# Patient Record
Sex: FEMALE | Race: BLACK OR AFRICAN AMERICAN | Employment: OTHER | URBAN - METROPOLITAN AREA
[De-identification: names, ages, dates, MRNs, and addresses within clinical notes are randomized per-mention and may not be internally consistent; named-entity substitution may affect disease eponyms.]

---

## 2017-02-28 ENCOUNTER — TELEPHONE (OUTPATIENT)
Dept: INTERNAL MEDICINE CLINIC | Age: 67
End: 2017-02-28

## 2017-05-12 RX ORDER — ZOLPIDEM TARTRATE 10 MG/1
TABLET ORAL
Qty: 30 TABLET | Refills: 0 | Status: SHIPPED | OUTPATIENT
Start: 2017-05-12 | End: 2017-07-28 | Stop reason: SDUPTHER

## 2017-05-22 ENCOUNTER — CLINICAL DOCUMENTATION (OUTPATIENT)
Dept: INTERNAL MEDICINE CLINIC | Age: 67
End: 2017-05-22

## 2017-07-28 ENCOUNTER — OFFICE VISIT (OUTPATIENT)
Dept: INTERNAL MEDICINE CLINIC | Age: 67
End: 2017-07-28

## 2017-07-28 DIAGNOSIS — E78.00 PURE HYPERCHOLESTEROLEMIA: ICD-10-CM

## 2017-07-28 DIAGNOSIS — R73.9 HYPERGLYCEMIA: ICD-10-CM

## 2017-07-28 DIAGNOSIS — I10 ESSENTIAL HYPERTENSION: ICD-10-CM

## 2017-07-28 DIAGNOSIS — Z00.00 ROUTINE GENERAL MEDICAL EXAMINATION AT A HEALTH CARE FACILITY: Primary | ICD-10-CM

## 2017-07-28 DIAGNOSIS — Z78.0 ASYMPTOMATIC POSTMENOPAUSAL STATUS (AGE-RELATED) (NATURAL): ICD-10-CM

## 2017-07-28 LAB
A/G RATIO: 1.8 (ref 1.1–2.2)
ALBUMIN SERPL-MCNC: 4.6 G/DL (ref 3.4–5)
ALP BLD-CCNC: 77 U/L (ref 40–129)
ALT SERPL-CCNC: 16 U/L (ref 10–40)
ANION GAP SERPL CALCULATED.3IONS-SCNC: 13 MMOL/L (ref 3–16)
AST SERPL-CCNC: 21 U/L (ref 15–37)
BILIRUB SERPL-MCNC: 1 MG/DL (ref 0–1)
BUN BLDV-MCNC: 16 MG/DL (ref 7–20)
CALCIUM SERPL-MCNC: 9.7 MG/DL (ref 8.3–10.6)
CHLORIDE BLD-SCNC: 101 MMOL/L (ref 99–110)
CHOLESTEROL, FASTING: 159 MG/DL (ref 0–199)
CO2: 29 MMOL/L (ref 21–32)
CREAT SERPL-MCNC: 0.8 MG/DL (ref 0.6–1.2)
GFR AFRICAN AMERICAN: >60
GFR NON-AFRICAN AMERICAN: >60
GLOBULIN: 2.5 G/DL
GLUCOSE FASTING: 102 MG/DL (ref 70–99)
HDLC SERPL-MCNC: 49 MG/DL (ref 40–60)
LDL CHOLESTEROL CALCULATED: 93 MG/DL
POTASSIUM SERPL-SCNC: 3.9 MMOL/L (ref 3.5–5.1)
SODIUM BLD-SCNC: 143 MMOL/L (ref 136–145)
TOTAL PROTEIN: 7.1 G/DL (ref 6.4–8.2)
TRIGLYCERIDE, FASTING: 85 MG/DL (ref 0–150)
VLDLC SERPL CALC-MCNC: 17 MG/DL

## 2017-07-28 PROCEDURE — 99397 PER PM REEVAL EST PAT 65+ YR: CPT | Performed by: INTERNAL MEDICINE

## 2017-07-28 RX ORDER — ZOLPIDEM TARTRATE 10 MG/1
TABLET ORAL
Qty: 30 TABLET | Refills: 2 | Status: SHIPPED | OUTPATIENT
Start: 2017-07-28 | End: 2018-02-08 | Stop reason: SDUPTHER

## 2017-07-28 ASSESSMENT — PATIENT HEALTH QUESTIONNAIRE - PHQ9
1. LITTLE INTEREST OR PLEASURE IN DOING THINGS: 0
SUM OF ALL RESPONSES TO PHQ QUESTIONS 1-9: 0
2. FEELING DOWN, DEPRESSED OR HOPELESS: 0
SUM OF ALL RESPONSES TO PHQ9 QUESTIONS 1 & 2: 0

## 2017-07-29 VITALS
DIASTOLIC BLOOD PRESSURE: 85 MMHG | BODY MASS INDEX: 29.35 KG/M2 | HEART RATE: 79 BPM | SYSTOLIC BLOOD PRESSURE: 132 MMHG | RESPIRATION RATE: 14 BRPM | HEIGHT: 67 IN | WEIGHT: 187 LBS

## 2017-07-29 LAB
ESTIMATED AVERAGE GLUCOSE: 125.5 MG/DL
HBA1C MFR BLD: 6 %

## 2017-08-08 RX ORDER — HYDROCHLOROTHIAZIDE 25 MG/1
25 TABLET ORAL DAILY
Qty: 90 TABLET | Refills: 0 | Status: SHIPPED | OUTPATIENT
Start: 2017-08-08 | End: 2017-10-01 | Stop reason: SDUPTHER

## 2017-08-08 RX ORDER — LEVOTHYROXINE SODIUM 0.15 MG/1
150 TABLET ORAL DAILY
Qty: 90 TABLET | Refills: 0 | Status: SHIPPED | OUTPATIENT
Start: 2017-08-08 | End: 2017-10-01 | Stop reason: SDUPTHER

## 2017-08-08 RX ORDER — SIMVASTATIN 40 MG
40 TABLET ORAL NIGHTLY
Qty: 90 TABLET | Refills: 0 | Status: SHIPPED | OUTPATIENT
Start: 2017-08-08 | End: 2017-10-01 | Stop reason: SDUPTHER

## 2017-08-31 ENCOUNTER — TELEPHONE (OUTPATIENT)
Dept: INTERNAL MEDICINE CLINIC | Age: 67
End: 2017-08-31

## 2017-08-31 NOTE — TELEPHONE ENCOUNTER
Pt called back to let Christine Santoyo know that she is on her way now to [facility]

## 2017-10-02 RX ORDER — HYDROCHLOROTHIAZIDE 25 MG/1
TABLET ORAL
Qty: 90 TABLET | Refills: 0 | Status: SHIPPED | OUTPATIENT
Start: 2017-10-02 | End: 2018-02-08 | Stop reason: SDUPTHER

## 2017-10-02 RX ORDER — SIMVASTATIN 40 MG
TABLET ORAL
Qty: 90 TABLET | Refills: 0 | Status: SHIPPED | OUTPATIENT
Start: 2017-10-02 | End: 2018-02-08 | Stop reason: SDUPTHER

## 2017-10-02 RX ORDER — LEVOTHYROXINE SODIUM 0.15 MG/1
TABLET ORAL
Qty: 90 TABLET | Refills: 0 | Status: SHIPPED | OUTPATIENT
Start: 2017-10-02 | End: 2018-02-08 | Stop reason: SDUPTHER

## 2018-02-08 ENCOUNTER — OFFICE VISIT (OUTPATIENT)
Dept: INTERNAL MEDICINE CLINIC | Age: 68
End: 2018-02-08

## 2018-02-08 VITALS
BODY MASS INDEX: 29.03 KG/M2 | WEIGHT: 184 LBS | HEART RATE: 80 BPM | DIASTOLIC BLOOD PRESSURE: 68 MMHG | SYSTOLIC BLOOD PRESSURE: 122 MMHG

## 2018-02-08 DIAGNOSIS — R73.9 HYPERGLYCEMIA: ICD-10-CM

## 2018-02-08 DIAGNOSIS — F51.04 PSYCHOPHYSIOLOGICAL INSOMNIA: ICD-10-CM

## 2018-02-08 DIAGNOSIS — E78.00 PURE HYPERCHOLESTEROLEMIA: ICD-10-CM

## 2018-02-08 DIAGNOSIS — E03.4 HYPOTHYROIDISM DUE TO ACQUIRED ATROPHY OF THYROID: ICD-10-CM

## 2018-02-08 DIAGNOSIS — Z78.0 POST-MENOPAUSAL: ICD-10-CM

## 2018-02-08 DIAGNOSIS — I10 ESSENTIAL HYPERTENSION: ICD-10-CM

## 2018-02-08 DIAGNOSIS — J06.9 URI, ACUTE: Primary | ICD-10-CM

## 2018-02-08 PROCEDURE — 1123F ACP DISCUSS/DSCN MKR DOCD: CPT | Performed by: INTERNAL MEDICINE

## 2018-02-08 PROCEDURE — 3014F SCREEN MAMMO DOC REV: CPT | Performed by: INTERNAL MEDICINE

## 2018-02-08 PROCEDURE — 99214 OFFICE O/P EST MOD 30 MIN: CPT | Performed by: INTERNAL MEDICINE

## 2018-02-08 PROCEDURE — 3017F COLORECTAL CA SCREEN DOC REV: CPT | Performed by: INTERNAL MEDICINE

## 2018-02-08 PROCEDURE — 4040F PNEUMOC VAC/ADMIN/RCVD: CPT | Performed by: INTERNAL MEDICINE

## 2018-02-08 PROCEDURE — G8427 DOCREV CUR MEDS BY ELIG CLIN: HCPCS | Performed by: INTERNAL MEDICINE

## 2018-02-08 PROCEDURE — G8484 FLU IMMUNIZE NO ADMIN: HCPCS | Performed by: INTERNAL MEDICINE

## 2018-02-08 PROCEDURE — G8417 CALC BMI ABV UP PARAM F/U: HCPCS | Performed by: INTERNAL MEDICINE

## 2018-02-08 PROCEDURE — 1036F TOBACCO NON-USER: CPT | Performed by: INTERNAL MEDICINE

## 2018-02-08 PROCEDURE — 1090F PRES/ABSN URINE INCON ASSESS: CPT | Performed by: INTERNAL MEDICINE

## 2018-02-08 PROCEDURE — G8400 PT W/DXA NO RESULTS DOC: HCPCS | Performed by: INTERNAL MEDICINE

## 2018-02-08 RX ORDER — AZITHROMYCIN 250 MG/1
TABLET, FILM COATED ORAL
Qty: 1 PACKET | Refills: 0 | Status: SHIPPED | OUTPATIENT
Start: 2018-02-08 | End: 2018-02-18

## 2018-02-08 RX ORDER — HYDROCHLOROTHIAZIDE 25 MG/1
TABLET ORAL
Qty: 90 TABLET | Refills: 1 | Status: SHIPPED | OUTPATIENT
Start: 2018-02-08 | End: 2018-08-03 | Stop reason: SDUPTHER

## 2018-02-08 RX ORDER — ZOLPIDEM TARTRATE 5 MG/1
5 TABLET ORAL NIGHTLY PRN
Qty: 30 TABLET | Refills: 2 | Status: SHIPPED | OUTPATIENT
Start: 2018-02-08 | End: 2018-04-26 | Stop reason: SDUPTHER

## 2018-02-08 RX ORDER — SIMVASTATIN 40 MG
TABLET ORAL
Qty: 90 TABLET | Refills: 1 | Status: SHIPPED | OUTPATIENT
Start: 2018-02-08 | End: 2018-08-03 | Stop reason: SDUPTHER

## 2018-02-08 RX ORDER — LEVOTHYROXINE SODIUM 0.15 MG/1
TABLET ORAL
Qty: 90 TABLET | Refills: 3 | Status: SHIPPED | OUTPATIENT
Start: 2018-02-08 | End: 2019-04-02 | Stop reason: SDUPTHER

## 2018-02-08 NOTE — PROGRESS NOTES
supple. No thyroid mass and no thyromegaly present. Cardiovascular: Normal rate, regular rhythm and normal heart sounds. Exam reveals no gallop and no friction rub. No murmur heard. Pulmonary/Chest: Effort normal and breath sounds normal. No respiratory distress. She has no wheezes. She has no rhonchi. She has no rales. Musculoskeletal: She exhibits edema (+1 bilateral ankles). Lymphadenopathy:     She has cervical adenopathy. Neurological: She is alert and oriented to person, place, and time. Skin: Skin is warm, dry and intact. No rash noted. No erythema. Psychiatric: She has a normal mood and affect.  Her speech is normal and behavior is normal. Judgment and thought content normal. Cognition and memory are normal.

## 2018-02-11 ENCOUNTER — TELEPHONE (OUTPATIENT)
Dept: INTERNAL MEDICINE CLINIC | Age: 68
End: 2018-02-11

## 2018-02-11 RX ORDER — BENZONATATE 100 MG/1
100 CAPSULE ORAL 3 TIMES DAILY PRN
Qty: 30 CAPSULE | Refills: 1 | Status: SHIPPED | OUTPATIENT
Start: 2018-02-11 | End: 2018-02-18

## 2018-02-11 NOTE — TELEPHONE ENCOUNTER
Follow-up from office visit 2 days ago: cough is worse, now productive of yellow sputum. Started Sarahi Parsley yesterday. Has tried Robitussin DM, but cough still kept her awake. No SOB, fevers or other new symptoms. Supportive care guidelines discussed. Script for [employer] sent to pharmacy. Continue Zithromax. Patient will call if symptoms change or worsen or if there is no significant improvement in 2-3 days.

## 2018-02-15 DIAGNOSIS — J06.9 URI, ACUTE: ICD-10-CM

## 2018-02-15 RX ORDER — AZITHROMYCIN 250 MG/1
TABLET, FILM COATED ORAL
Qty: 1 PACKET | Refills: 0 | Status: CANCELLED | OUTPATIENT
Start: 2018-02-15 | End: 2018-02-25

## 2018-02-15 RX ORDER — AMOXICILLIN AND CLAVULANATE POTASSIUM 875; 125 MG/1; MG/1
1 TABLET, FILM COATED ORAL 2 TIMES DAILY
Qty: 20 TABLET | Refills: 0 | Status: SHIPPED | OUTPATIENT
Start: 2018-02-15 | End: 2020-12-02 | Stop reason: SDUPTHER

## 2018-02-15 NOTE — TELEPHONE ENCOUNTER
From: Jessica Pascal  Sent: 2/15/2018 12:30 PM EST  Subject: Medication Renewal Request    Jessica Pascal would like a refill of the following medications:  azithromycin (ZITHROMAX Z-GAVIN) 250 MG tablet Sandor Foster MD]    Preferred pharmacy: [address] [place] [address] [phone] - F [phone]    Comment:

## 2018-02-27 ENCOUNTER — PATIENT MESSAGE (OUTPATIENT)
Dept: INTERNAL MEDICINE CLINIC | Age: 68
End: 2018-02-27

## 2018-02-27 RX ORDER — FLUCONAZOLE 200 MG/1
TABLET ORAL
Qty: 2 TABLET | Refills: 0 | Status: SHIPPED | OUTPATIENT
Start: 2018-02-27 | End: 2018-11-08

## 2018-04-27 RX ORDER — ZOLPIDEM TARTRATE 5 MG/1
TABLET ORAL
Qty: 30 TABLET | Refills: 0 | Status: SHIPPED | OUTPATIENT
Start: 2018-04-27 | End: 2018-05-27

## 2018-08-03 ENCOUNTER — OFFICE VISIT (OUTPATIENT)
Dept: INTERNAL MEDICINE CLINIC | Age: 68
End: 2018-08-03

## 2018-08-03 VITALS
BODY MASS INDEX: 29.98 KG/M2 | SYSTOLIC BLOOD PRESSURE: 116 MMHG | DIASTOLIC BLOOD PRESSURE: 78 MMHG | HEIGHT: 67 IN | WEIGHT: 191 LBS | HEART RATE: 68 BPM

## 2018-08-03 DIAGNOSIS — Z00.00 ROUTINE GENERAL MEDICAL EXAMINATION AT A HEALTH CARE FACILITY: Primary | ICD-10-CM

## 2018-08-03 DIAGNOSIS — I10 ESSENTIAL HYPERTENSION: ICD-10-CM

## 2018-08-03 DIAGNOSIS — F51.04 PSYCHOPHYSIOLOGICAL INSOMNIA: ICD-10-CM

## 2018-08-03 DIAGNOSIS — E78.00 PURE HYPERCHOLESTEROLEMIA: ICD-10-CM

## 2018-08-03 DIAGNOSIS — E03.4 HYPOTHYROIDISM DUE TO ACQUIRED ATROPHY OF THYROID: ICD-10-CM

## 2018-08-03 DIAGNOSIS — R73.9 HYPERGLYCEMIA: ICD-10-CM

## 2018-08-03 PROCEDURE — 90471 IMMUNIZATION ADMIN: CPT | Performed by: INTERNAL MEDICINE

## 2018-08-03 PROCEDURE — 90732 PPSV23 VACC 2 YRS+ SUBQ/IM: CPT | Performed by: INTERNAL MEDICINE

## 2018-08-03 PROCEDURE — 99397 PER PM REEVAL EST PAT 65+ YR: CPT | Performed by: INTERNAL MEDICINE

## 2018-08-03 RX ORDER — ZOLPIDEM TARTRATE 5 MG/1
5 TABLET ORAL NIGHTLY PRN
COMMUNITY
End: 2018-08-03 | Stop reason: SDUPTHER

## 2018-08-03 RX ORDER — HYDROCHLOROTHIAZIDE 25 MG/1
TABLET ORAL
Qty: 90 TABLET | Refills: 1 | Status: SHIPPED | OUTPATIENT
Start: 2018-08-03 | End: 2019-04-02 | Stop reason: SDUPTHER

## 2018-08-03 RX ORDER — SIMVASTATIN 40 MG
TABLET ORAL
Qty: 90 TABLET | Refills: 1 | Status: SHIPPED | OUTPATIENT
Start: 2018-08-03 | End: 2019-04-02 | Stop reason: SDUPTHER

## 2018-08-03 RX ORDER — ZOLPIDEM TARTRATE 5 MG/1
5 TABLET ORAL NIGHTLY PRN
Qty: 30 TABLET | Refills: 2 | Status: SHIPPED | OUTPATIENT
Start: 2018-08-03 | End: 2019-04-09 | Stop reason: SDUPTHER

## 2018-08-03 ASSESSMENT — PATIENT HEALTH QUESTIONNAIRE - PHQ9
SUM OF ALL RESPONSES TO PHQ9 QUESTIONS 1 & 2: 0
1. LITTLE INTEREST OR PLEASURE IN DOING THINGS: 0
2. FEELING DOWN, DEPRESSED OR HOPELESS: 0
SUM OF ALL RESPONSES TO PHQ QUESTIONS 1-9: 0

## 2018-08-03 NOTE — PROGRESS NOTES
Hypertension     Hypothyroidism     Insomnia     Kidney cysts     Liver cyst      Past Surgical History:   Procedure Laterality Date    HYSTERECTOMY  1996    THYROIDECTOMY, PARTIAL Right 1998    Thyroid nodule     Family History   Problem Relation Age of Onset    Hypertension Mother        Review of Systems:  A comprehensive review of systems was negative except for: hot flashes/night sweats     Physical Exam   Constitutional: She is oriented to person, place, and time. She appears well-developed and well-nourished. No distress. HENT:   Head: Normocephalic and atraumatic. Right Ear: Tympanic membrane, external ear and ear canal normal.   Left Ear: Tympanic membrane, external ear and ear canal normal.   Mouth/Throat: Oropharynx is clear and moist and mucous membranes are normal.   Eyes: Conjunctivae and lids are normal. Pupils are equal, round, and reactive to light. Neck: Neck supple. Carotid bruit is not present. No thyroid mass and no thyromegaly present. Cardiovascular: Normal rate, regular rhythm, normal heart sounds and intact distal pulses. Exam reveals no gallop and no friction rub. No murmur heard. Pulmonary/Chest: Effort normal and breath sounds normal. No respiratory distress. She has no wheezes. She has no rhonchi. She has no rales. Abdominal: Soft. Normal aorta and bowel sounds are normal. She exhibits no distension and no mass. There is no hepatosplenomegaly. There is no tenderness. Musculoskeletal: Normal range of motion. She exhibits no edema or tenderness. Lymphadenopathy:     She has no cervical adenopathy. Neurological: She is alert and oriented to person, place, and time. She has normal reflexes. Coordination normal.   Skin: Skin is warm and dry. No rash noted. No erythema. No suspicious lesions. Psychiatric: She has a normal mood and affect.  Her speech is normal and behavior is normal. Judgment and thought content normal. Cognition and memory are normal. Lipid panel:   Lab Results   Component Value Date    CHOL 238 (H) 11/11/2016    TRIG 106 11/11/2016    HDL 51 02/08/2018    LDLCALC 111 (H) 02/08/2018     Glucose:   Glucose (mg/dL)   Date Value   11/11/2016 105 (H)       Preventive Care:  Hx abnormal PAP: yes - remote  Self-breast exams: yes  Hx of abnormal mammogram: yes - no biopsy  Previous DEXA scan: no  Eye exam within the past 2 years: yes  Dental exam every 6 months: yes  Seatbelt used consistently: yes  Smoke and carbon monoxide detectors in home: yes   Exercise: treadmill 30 min 3x/week, weights 2x/week  Social History   Substance Use Topics    Smoking status: Former Smoker     Packs/day: 0.10     Years: 20.00     Types: Cigarettes    Smokeless tobacco: Never Used    Alcohol use 1.8 oz/week     3 Shots of liquor per week     History   Sexual Activity    Sexual activity: Yes    Partners: Male    Birth control/ protection: Surgical       Advance Directive: N, Not Received    Immunization History   Administered Date(s) Administered    DTaP 06/26/2012    Pneumococcal 13-valent Conjugate (Grjtisr17) 11/11/2016       Health Maintenance   Topic Date Due    Shingles Vaccine (1 of 2 - 2 Dose Series) 12/14/2000    DEXA (modify frequency per FRAX score)  12/14/2015    Pneumococcal low/med risk (2 of 2 - PPSV23) 11/11/2017    Lipid screen  08/08/2018    Flu vaccine (1) 09/01/2018    A1C test (Diabetic or Prediabetic)  02/08/2019    TSH testing  02/08/2019    Potassium monitoring  02/08/2019    Creatinine monitoring  02/08/2019    Breast cancer screen  07/06/2019    Colon cancer screen colonoscopy  10/08/2020    DTaP/Tdap/Td vaccine (2 - Tdap) 06/26/2022    Hepatitis C screen  Completed          Assessment/Plan:  1.  Routine general medical examination at a health care facility  Personalized Preventive Plan is provided to patient in written form- see Patient Instructions   - Patient has no Advanced Directive, so was encouraged to complete this document and forward a copy to be scanned into the electronic medical record:  additional information provided. - Pneumovax administered  - Shingrix script provided  - She will obtain Dexa scan once she starts Medicare  - Flu vaccine in the fall  - Patient counseled on diet and exercise. 2. Essential hypertension  Well-controlled. Continue current medications. - Comprehensive Metabolic Panel, Fasting; Future    3. Pure hypercholesterolemia  Tolerating current dose(s) of Zocor- continue.   - Lipid, Fasting; Future    4. Hyperglycemia  Importance of lifestyle changes stressed to help prevent progression to diabetes. - Hemoglobin A1C; Future    5. Hypothyroidism due to acquired atrophy of thyroid  Clinically euthyroid, but will adjust Synthroid dose if indicated by TSH result.  - TSH without Reflex; Future    6. Psychophysiological insomnia  Potential risks of this medication for patients > 65 discussed. She will continue lowest effective dose. Principles of good sleep hygiene discussed. - zolpidem (AMBIEN) 5 MG tablet; Take 1 tablet by mouth nightly as needed for Sleep for up to 30 days. .  Dispense: 30 tablet; Refill: 2       Return in about 6 months (around 2/3/2019).

## 2018-08-03 NOTE — LETTER
which may also result in my being prevented from receiving further care from this office. · Other:____________________________________________________________________    AGREEMENT:    I have read the above and have had all of my questions answered. For chronic disease management, I know that my symptoms can be managed with many types of treatments. A chronic medication trial may be part of my treatment, but I must be an active participant in my care. Medication therapy is only one part of my symptom management plan. In some cases, there may be limited scientific evidence to support the chronic use of certain medications to improve symptoms and daily function. Furthermore, in certain circumstances, there may be scientific information that suggests that use of chronic controlled substances may actually worsen my symptoms and increase my risk of unintentional death directly related to this medication therapy. I know that if my provider feels my risk from controlled medications is greater than my benefit, I will have my controlled substance medication(s) compassionately lowered or removed altogether. I agree to a controlled substance medication trial.      I further agree to allow this office to contact family or friends if there are concerns about my safety and use of the controlled medications. I have agreed to use the following medications above as instructed by my physician and as stated in this [facility] [id number].      Patient Signature:  ______________________  Date:8/3/2018 or _____________    Provider Signature:______________________  Date:8/3/2018 or _____________

## 2018-10-06 ENCOUNTER — TELEPHONE (OUTPATIENT)
Dept: PAIN MANAGEMENT | Age: 68
End: 2018-10-06

## 2018-10-06 NOTE — TELEPHONE ENCOUNTER
I spoke with patient and she stated that she is not going to do until Medicare covers it. It would cost a lot of money.

## 2018-11-08 ENCOUNTER — PATIENT MESSAGE (OUTPATIENT)
Dept: INTERNAL MEDICINE CLINIC | Age: 68
End: 2018-11-08

## 2019-01-07 ENCOUNTER — TELEPHONE (OUTPATIENT)
Dept: INTERNAL MEDICINE CLINIC | Age: 69
End: 2019-01-07

## 2019-01-07 PROBLEM — S12.100A FRACTURE OF SECOND CERVICAL VERTEBRA (HCC): Status: ACTIVE | Noted: 2019-01-07

## 2019-01-07 PROBLEM — S02.2XXA CLOSED FRACTURE OF NASAL BONE: Status: ACTIVE | Noted: 2019-01-07

## 2019-01-14 ENCOUNTER — PATIENT MESSAGE (OUTPATIENT)
Dept: INTERNAL MEDICINE CLINIC | Age: 69
End: 2019-01-14

## 2019-01-18 ENCOUNTER — TELEPHONE (OUTPATIENT)
Dept: INTERNAL MEDICINE CLINIC | Age: 69
End: 2019-01-18

## 2019-02-07 ENCOUNTER — TELEPHONE (OUTPATIENT)
Dept: INTERNAL MEDICINE CLINIC | Age: 69
End: 2019-02-07

## 2019-02-08 ENCOUNTER — OFFICE VISIT (OUTPATIENT)
Dept: INTERNAL MEDICINE CLINIC | Age: 69
End: 2019-02-08
Payer: COMMERCIAL

## 2019-02-08 VITALS
BODY MASS INDEX: 29.89 KG/M2 | HEART RATE: 88 BPM | DIASTOLIC BLOOD PRESSURE: 78 MMHG | SYSTOLIC BLOOD PRESSURE: 126 MMHG | WEIGHT: 188 LBS

## 2019-02-08 DIAGNOSIS — R73.9 HYPERGLYCEMIA: ICD-10-CM

## 2019-02-08 DIAGNOSIS — S62.660A CLOSED NONDISPLACED FRACTURE OF DISTAL PHALANX OF RIGHT INDEX FINGER, INITIAL ENCOUNTER: ICD-10-CM

## 2019-02-08 DIAGNOSIS — S02.2XXD CLOSED FRACTURE OF NASAL BONE WITH ROUTINE HEALING, SUBSEQUENT ENCOUNTER: ICD-10-CM

## 2019-02-08 DIAGNOSIS — I10 ESSENTIAL HYPERTENSION: ICD-10-CM

## 2019-02-08 DIAGNOSIS — I77.74 DISSECTION OF VERTEBRAL ARTERY (HCC): ICD-10-CM

## 2019-02-08 DIAGNOSIS — S12.101D CLOSED NONDISPLACED FRACTURE OF SECOND CERVICAL VERTEBRA WITH ROUTINE HEALING, UNSPECIFIED FRACTURE MORPHOLOGY, SUBSEQUENT ENCOUNTER: Primary | ICD-10-CM

## 2019-02-08 PROCEDURE — G8427 DOCREV CUR MEDS BY ELIG CLIN: HCPCS | Performed by: INTERNAL MEDICINE

## 2019-02-08 PROCEDURE — G8417 CALC BMI ABV UP PARAM F/U: HCPCS | Performed by: INTERNAL MEDICINE

## 2019-02-08 PROCEDURE — 3017F COLORECTAL CA SCREEN DOC REV: CPT | Performed by: INTERNAL MEDICINE

## 2019-02-08 PROCEDURE — 4040F PNEUMOC VAC/ADMIN/RCVD: CPT | Performed by: INTERNAL MEDICINE

## 2019-02-08 PROCEDURE — 1101F PT FALLS ASSESS-DOCD LE1/YR: CPT | Performed by: INTERNAL MEDICINE

## 2019-02-08 PROCEDURE — 1090F PRES/ABSN URINE INCON ASSESS: CPT | Performed by: INTERNAL MEDICINE

## 2019-02-08 PROCEDURE — 1036F TOBACCO NON-USER: CPT | Performed by: INTERNAL MEDICINE

## 2019-02-08 PROCEDURE — G8484 FLU IMMUNIZE NO ADMIN: HCPCS | Performed by: INTERNAL MEDICINE

## 2019-02-08 PROCEDURE — G8400 PT W/DXA NO RESULTS DOC: HCPCS | Performed by: INTERNAL MEDICINE

## 2019-02-08 PROCEDURE — 99215 OFFICE O/P EST HI 40 MIN: CPT | Performed by: INTERNAL MEDICINE

## 2019-02-08 PROCEDURE — 1123F ACP DISCUSS/DSCN MKR DOCD: CPT | Performed by: INTERNAL MEDICINE

## 2019-02-08 ASSESSMENT — PATIENT HEALTH QUESTIONNAIRE - PHQ9
SUM OF ALL RESPONSES TO PHQ QUESTIONS 1-9: 0
SUM OF ALL RESPONSES TO PHQ9 QUESTIONS 1 & 2: 0
SUM OF ALL RESPONSES TO PHQ QUESTIONS 1-9: 0
2. FEELING DOWN, DEPRESSED OR HOPELESS: 0
1. LITTLE INTEREST OR PLEASURE IN DOING THINGS: 0

## 2019-02-24 ENCOUNTER — PATIENT MESSAGE (OUTPATIENT)
Dept: INTERNAL MEDICINE CLINIC | Age: 69
End: 2019-02-24

## 2019-02-24 DIAGNOSIS — I77.74 DISSECTION OF VERTEBRAL ARTERY (HCC): ICD-10-CM

## 2019-02-24 DIAGNOSIS — S12.101D CLOSED NONDISPLACED FRACTURE OF SECOND CERVICAL VERTEBRA WITH ROUTINE HEALING, UNSPECIFIED FRACTURE MORPHOLOGY, SUBSEQUENT ENCOUNTER: Primary | ICD-10-CM

## 2019-02-27 ENCOUNTER — OFFICE VISIT (OUTPATIENT)
Dept: ORTHOPEDIC SURGERY | Age: 69
End: 2019-02-27
Payer: COMMERCIAL

## 2019-02-27 VITALS
HEIGHT: 67 IN | SYSTOLIC BLOOD PRESSURE: 135 MMHG | HEART RATE: 77 BPM | RESPIRATION RATE: 16 BRPM | WEIGHT: 187 LBS | DIASTOLIC BLOOD PRESSURE: 92 MMHG | BODY MASS INDEX: 29.35 KG/M2

## 2019-02-27 DIAGNOSIS — M79.644 FINGER PAIN, RIGHT: Primary | ICD-10-CM

## 2019-02-27 DIAGNOSIS — M79.644 PAIN OF FINGER OF RIGHT HAND: ICD-10-CM

## 2019-02-27 PROCEDURE — 1123F ACP DISCUSS/DSCN MKR DOCD: CPT | Performed by: PHYSICIAN ASSISTANT

## 2019-02-27 PROCEDURE — G8400 PT W/DXA NO RESULTS DOC: HCPCS | Performed by: PHYSICIAN ASSISTANT

## 2019-02-27 PROCEDURE — 99203 OFFICE O/P NEW LOW 30 MIN: CPT | Performed by: PHYSICIAN ASSISTANT

## 2019-02-27 PROCEDURE — G8427 DOCREV CUR MEDS BY ELIG CLIN: HCPCS | Performed by: PHYSICIAN ASSISTANT

## 2019-02-27 PROCEDURE — G8417 CALC BMI ABV UP PARAM F/U: HCPCS | Performed by: PHYSICIAN ASSISTANT

## 2019-02-27 PROCEDURE — G8484 FLU IMMUNIZE NO ADMIN: HCPCS | Performed by: PHYSICIAN ASSISTANT

## 2019-02-27 PROCEDURE — 4040F PNEUMOC VAC/ADMIN/RCVD: CPT | Performed by: PHYSICIAN ASSISTANT

## 2019-02-27 PROCEDURE — 3017F COLORECTAL CA SCREEN DOC REV: CPT | Performed by: PHYSICIAN ASSISTANT

## 2019-02-27 PROCEDURE — 1036F TOBACCO NON-USER: CPT | Performed by: PHYSICIAN ASSISTANT

## 2019-02-27 PROCEDURE — 1090F PRES/ABSN URINE INCON ASSESS: CPT | Performed by: PHYSICIAN ASSISTANT

## 2019-02-27 PROCEDURE — 1101F PT FALLS ASSESS-DOCD LE1/YR: CPT | Performed by: PHYSICIAN ASSISTANT

## 2019-03-04 ENCOUNTER — OFFICE VISIT (OUTPATIENT)
Dept: PSYCHOLOGY | Age: 69
End: 2019-03-04
Payer: COMMERCIAL

## 2019-03-04 DIAGNOSIS — F43.10 PTSD (POST-TRAUMATIC STRESS DISORDER): Primary | ICD-10-CM

## 2019-03-04 PROCEDURE — 90791 PSYCH DIAGNOSTIC EVALUATION: CPT | Performed by: PSYCHOLOGIST

## 2019-03-08 ENCOUNTER — TELEPHONE (OUTPATIENT)
Dept: INTERNAL MEDICINE CLINIC | Age: 69
End: 2019-03-08

## 2019-04-01 ENCOUNTER — OFFICE VISIT (OUTPATIENT)
Dept: PSYCHOLOGY | Age: 69
End: 2019-04-01
Payer: COMMERCIAL

## 2019-04-01 DIAGNOSIS — F43.10 PTSD (POST-TRAUMATIC STRESS DISORDER): Primary | ICD-10-CM

## 2019-04-01 PROCEDURE — 90832 PSYTX W PT 30 MINUTES: CPT | Performed by: PSYCHOLOGIST

## 2019-04-01 NOTE — Clinical Note
Hi Dr. Brody Hood, I met w/ Ms. Carola Garcia today who continues to note difficulty sleeping (has not slept 8 hours in the past three months). We worked on sleep hygiene last visit and she is now going to bed quicker but continues to wake up frequently during the night (sometime out of the blue and sometimes d/t pain). She is interested in talking about a non-habit forming sleep aid. She has an appt with you in mid-May but this is significantly impacting her mood. Should I instruct her to set up an appointment w/ you sooner? Thanks
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
## 2019-04-02 RX ORDER — SIMVASTATIN 40 MG
TABLET ORAL
Qty: 90 TABLET | Refills: 1 | Status: SHIPPED | OUTPATIENT
Start: 2019-04-02 | End: 2019-11-01 | Stop reason: SDUPTHER

## 2019-04-02 RX ORDER — HYDROCHLOROTHIAZIDE 25 MG/1
TABLET ORAL
Qty: 90 TABLET | Refills: 1 | Status: SHIPPED | OUTPATIENT
Start: 2019-04-02 | End: 2019-11-01 | Stop reason: SDUPTHER

## 2019-04-02 RX ORDER — LEVOTHYROXINE SODIUM 0.15 MG/1
TABLET ORAL
Qty: 90 TABLET | Refills: 3 | Status: SHIPPED | OUTPATIENT
Start: 2019-04-02 | End: 2020-04-09

## 2019-04-09 ENCOUNTER — TELEPHONE (OUTPATIENT)
Dept: ORTHOPEDIC SURGERY | Age: 69
End: 2019-04-09

## 2019-04-09 DIAGNOSIS — F51.04 PSYCHOPHYSIOLOGICAL INSOMNIA: ICD-10-CM

## 2019-04-09 RX ORDER — ZOLPIDEM TARTRATE 5 MG/1
TABLET ORAL
Qty: 30 TABLET | Refills: 0 | Status: SHIPPED | OUTPATIENT
Start: 2019-04-09 | End: 2019-07-08

## 2019-04-09 NOTE — TELEPHONE ENCOUNTER
Pt last seen in Feb and states her finger is still bothering her  Pt is requesting OT   pls call pt thanks

## 2019-04-09 NOTE — TELEPHONE ENCOUNTER
Spoke with patient. She reports that she was not told to follow up if she was still having problems. Apologized for any miscommunication and scheduled for appointment.

## 2019-04-10 ENCOUNTER — OFFICE VISIT (OUTPATIENT)
Dept: ORTHOPEDIC SURGERY | Age: 69
End: 2019-04-10
Payer: COMMERCIAL

## 2019-04-10 VITALS — HEIGHT: 67 IN | WEIGHT: 187 LBS | RESPIRATION RATE: 16 BRPM | BODY MASS INDEX: 29.35 KG/M2

## 2019-04-10 DIAGNOSIS — M79.644 PAIN OF FINGER OF RIGHT HAND: Primary | ICD-10-CM

## 2019-04-10 PROCEDURE — 4040F PNEUMOC VAC/ADMIN/RCVD: CPT | Performed by: PHYSICIAN ASSISTANT

## 2019-04-10 PROCEDURE — G8427 DOCREV CUR MEDS BY ELIG CLIN: HCPCS | Performed by: PHYSICIAN ASSISTANT

## 2019-04-10 PROCEDURE — 99213 OFFICE O/P EST LOW 20 MIN: CPT | Performed by: PHYSICIAN ASSISTANT

## 2019-04-10 PROCEDURE — 1090F PRES/ABSN URINE INCON ASSESS: CPT | Performed by: PHYSICIAN ASSISTANT

## 2019-04-10 PROCEDURE — G8417 CALC BMI ABV UP PARAM F/U: HCPCS | Performed by: PHYSICIAN ASSISTANT

## 2019-04-10 PROCEDURE — G8400 PT W/DXA NO RESULTS DOC: HCPCS | Performed by: PHYSICIAN ASSISTANT

## 2019-04-10 PROCEDURE — 3017F COLORECTAL CA SCREEN DOC REV: CPT | Performed by: PHYSICIAN ASSISTANT

## 2019-04-10 PROCEDURE — 1036F TOBACCO NON-USER: CPT | Performed by: PHYSICIAN ASSISTANT

## 2019-04-10 PROCEDURE — 1123F ACP DISCUSS/DSCN MKR DOCD: CPT | Performed by: PHYSICIAN ASSISTANT
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
## 2019-04-10 NOTE — PATIENT INSTRUCTIONS
Thank you for choosing [facility]) Physicians for your Hand and Upper Extremity needs. If we can be of any further assistance to you, please do not hesitate to contact us.     Office Phone Number:  [phone]  or  [phone]

## 2019-04-11 ENCOUNTER — TELEPHONE (OUTPATIENT)
Dept: INTERNAL MEDICINE CLINIC | Age: 69
End: 2019-04-11

## 2019-04-11 NOTE — TELEPHONE ENCOUNTER
Called patient this is in the email sent by patient. Concerned about pinched nerve, and is being treated for post concussion.

## 2019-04-29 ENCOUNTER — OFFICE VISIT (OUTPATIENT)
Dept: PSYCHOLOGY | Age: 69
End: 2019-04-29
Payer: COMMERCIAL

## 2019-04-29 DIAGNOSIS — F43.10 PTSD (POST-TRAUMATIC STRESS DISORDER): Primary | ICD-10-CM

## 2019-04-29 PROCEDURE — 90832 PSYTX W PT 30 MINUTES: CPT | Performed by: PSYCHOLOGIST

## 2019-04-29 NOTE — PROGRESS NOTES
Behavioral Health Consultation  Markus Hightower, Ph.D.  Psychologist  4/29/2019         10:34 AM     Time spent with Patient: 30 minutes  This is patient's third [facility] appointment. Reason for Consult:    Chief Complaint   Patient presents with    Anxiety     Referring Provider: Frank Lizama MD  [address] [facility], [address]      Feedback given to PCP. S:  Pt seen per PCP re: anxiety     Pt seen for f/u. Pt reported that since last visit she saw a neurologist at [facility] who prescribed her nortriptyline which she has found beneficial for her sleep. Pt is now getting 4 hours of sleep per night but wakes up in significant pain and then is worried to go back to sleep. Pt noted that she is specifically concerned that she will experience a stroke or another significant event in her sleep and that the pain is a warning sign. Discussed and practice using cognitive restructuring to manage anxious thoughts. Also discussed potentially setting alarm for 2 hours into sleep to change positions as she noted her pain is exacerbated by staying in the same position during sleep. Pt noted frustration as she feels that her pain is being dismissed by doctors and that her advocating for herself is being mislabeled as anxiety. Pt described feeling that doctors believe it is \"all in her head\". Discussed how to use assertive communication to communicate effectively with providers.      O:  MSE:    Appearance    alert, cooperative, mild distress  Impulsive behavior No  Speech    normal rate, normal volume and well articulated  Mood    Anxious  Depressed  Affect    anxiety  Thought Content    intact  Thought Process    linear and coherent  Associations    logical connections  Insight    Good  Judgment    Intact  Orientation    oriented to person, place, time, and general circumstances  Memory    recent and remote memory intact  Attention/Concentration    intact  Morbid ideation No  Suicide Assessment    no suicidal ideation    History:    Social History:   Social History     Socioeconomic History    Marital status: Single     Spouse name: Not on file    Number of children: 2    Years of education: Not on file    Highest education level: Not on file   Occupational History    Occupation:    Social Needs    Financial resource strain: Not on file    Food insecurity:     Worry: Not on file     Inability: Not on file    Transportation needs:     Medical: Not on file     Non-medical: Not on file   Tobacco Use    Smoking status: Former Smoker     Packs/day: 0.10     Years: 20.00     Pack years: 2.00     Types: Cigarettes     Last attempt to quit: 1988     Years since quittin.3    Smokeless tobacco: Never Used   Substance and Sexual Activity    Alcohol use: Yes     Alcohol/week: 1.8 oz     Types: 3 Shots of liquor per week    Drug use: No    Sexual activity: Yes     Partners: Male     Birth control/protection: Surgical   Lifestyle    Physical activity:     Days per week: Not on file     Minutes per session: Not on file    Stress: Not on file   Relationships    Social connections:     Talks on phone: Not on file     Gets together: Not on file     Attends Protestant service: Not on file     Active member of club or organization: Not on file     Attends meetings of clubs or organizations: Not on file     Relationship status: Not on file    Intimate partner violence:     Fear of current or ex partner: Not on file     Emotionally abused: Not on file     Physically abused: Not on file     Forced sexual activity: Not on file   Other Topics Concern    Not on file   Social History Narrative    Not on file     TOBACCO:   reports that she quit smoking about 31 years ago. Her smoking use included cigarettes. She has a 2.00 pack-year smoking history. She has never used smokeless tobacco.  ETOH:   reports that she drinks about 1.8 oz of alcohol per week. A:  Patient engaged and cooperative. Denies SI.  Insight

## 2019-04-29 NOTE — PATIENT INSTRUCTIONS
1) Try looking up progressive muscle relaxation or body scan on youtube. Do one video a night at [facility].

## 2019-04-30 ENCOUNTER — OFFICE VISIT (OUTPATIENT)
Dept: INTERNAL MEDICINE CLINIC | Age: 69
End: 2019-04-30
Payer: COMMERCIAL

## 2019-04-30 VITALS
HEART RATE: 102 BPM | DIASTOLIC BLOOD PRESSURE: 78 MMHG | WEIGHT: 190 LBS | BODY MASS INDEX: 30.21 KG/M2 | SYSTOLIC BLOOD PRESSURE: 134 MMHG | OXYGEN SATURATION: 97 %
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
## 2019-04-30 DIAGNOSIS — E03.4 HYPOTHYROIDISM DUE TO ACQUIRED ATROPHY OF THYROID: ICD-10-CM

## 2019-04-30 DIAGNOSIS — F51.04 PSYCHOPHYSIOLOGICAL INSOMNIA: ICD-10-CM

## 2019-04-30 DIAGNOSIS — R73.9 HYPERGLYCEMIA: ICD-10-CM

## 2019-04-30 DIAGNOSIS — E78.00 PURE HYPERCHOLESTEROLEMIA: ICD-10-CM

## 2019-04-30 DIAGNOSIS — I10 ESSENTIAL HYPERTENSION: Primary | ICD-10-CM

## 2019-04-30 PROBLEM — S02.2XXA CLOSED FRACTURE OF NASAL BONE: Status: RESOLVED | Noted: 2019-01-07 | Resolved: 2019-04-30
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
## 2019-04-30 PROBLEM — S62.660A CLOSED NONDISPLACED FRACTURE OF DISTAL PHALANX OF RIGHT INDEX FINGER: Status: RESOLVED | Noted: 2019-02-08 | Resolved: 2019-04-30

## 2019-04-30 PROCEDURE — 99214 OFFICE O/P EST MOD 30 MIN: CPT | Performed by: INTERNAL MEDICINE

## 2019-04-30 PROCEDURE — G8427 DOCREV CUR MEDS BY ELIG CLIN: HCPCS | Performed by: INTERNAL MEDICINE

## 2019-04-30 PROCEDURE — 3017F COLORECTAL CA SCREEN DOC REV: CPT | Performed by: INTERNAL MEDICINE

## 2019-04-30 PROCEDURE — 1090F PRES/ABSN URINE INCON ASSESS: CPT | Performed by: INTERNAL MEDICINE

## 2019-04-30 PROCEDURE — G8417 CALC BMI ABV UP PARAM F/U: HCPCS | Performed by: INTERNAL MEDICINE

## 2019-04-30 PROCEDURE — G8400 PT W/DXA NO RESULTS DOC: HCPCS | Performed by: INTERNAL MEDICINE

## 2019-04-30 PROCEDURE — 1036F TOBACCO NON-USER: CPT | Performed by: INTERNAL MEDICINE

## 2019-04-30 PROCEDURE — 1123F ACP DISCUSS/DSCN MKR DOCD: CPT | Performed by: INTERNAL MEDICINE

## 2019-04-30 PROCEDURE — 4040F PNEUMOC VAC/ADMIN/RCVD: CPT | Performed by: INTERNAL MEDICINE

## 2019-04-30 RX ORDER — NORTRIPTYLINE HYDROCHLORIDE 10 MG/1
10 CAPSULE ORAL NIGHTLY
COMMUNITY
End: 2020-04-14 | Stop reason: ALTCHOICE

## 2019-04-30 RX ORDER — GABAPENTIN 100 MG/1
100 CAPSULE ORAL 3 TIMES DAILY
COMMUNITY
End: 2020-04-14
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
## 2019-05-14 ENCOUNTER — OFFICE VISIT (OUTPATIENT)
Dept: PSYCHOLOGY | Age: 69
End: 2019-05-14
Payer: COMMERCIAL

## 2019-05-14 DIAGNOSIS — F43.10 PTSD (POST-TRAUMATIC STRESS DISORDER): Primary | ICD-10-CM

## 2019-05-14 PROCEDURE — 90832 PSYTX W PT 30 MINUTES: CPT | Performed by: PSYCHOLOGIST
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
## 2019-05-21 PROBLEM — G44.309 POST-TRAUMATIC HEADACHE: Status: ACTIVE | Noted: 2019-05-21

## 2019-05-28 ENCOUNTER — OFFICE VISIT (OUTPATIENT)
Dept: PSYCHOLOGY | Age: 69
End: 2019-05-28
Payer: COMMERCIAL

## 2019-05-28 DIAGNOSIS — F43.10 PTSD (POST-TRAUMATIC STRESS DISORDER): Primary | ICD-10-CM

## 2019-05-28 PROCEDURE — 90832 PSYTX W PT 30 MINUTES: CPT | Performed by: PSYCHOLOGIST
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
## 2019-06-04 ENCOUNTER — HOSPITAL ENCOUNTER (OUTPATIENT)
Age: 69
Discharge: HOME | End: 2019-06-04
Payer: COMMERCIAL

## 2019-06-04 DIAGNOSIS — R73.9 HYPERGLYCEMIA: ICD-10-CM

## 2019-06-04 DIAGNOSIS — I10 ESSENTIAL HYPERTENSION: ICD-10-CM

## 2019-06-04 DIAGNOSIS — E78.00 PURE HYPERCHOLESTEROLEMIA: ICD-10-CM

## 2019-06-04 DIAGNOSIS — E03.4 HYPOTHYROIDISM DUE TO ACQUIRED ATROPHY OF THYROID: ICD-10-CM

## 2019-06-04 LAB
A/G RATIO: 1.4 (ref 1.1–2.2)
ALBUMIN SERPL-MCNC: 4.4 G/DL (ref 3.4–5)
ALP BLD-CCNC: 76 U/L (ref 40–129)
ALT SERPL-CCNC: 16 U/L (ref 10–40)
ANION GAP SERPL CALCULATED.3IONS-SCNC: 14 MMOL/L (ref 3–16)
AST SERPL-CCNC: 23 U/L (ref 15–37)
BILIRUB SERPL-MCNC: 1 MG/DL (ref 0–1)
BUN BLDV-MCNC: 16 MG/DL (ref 7–20)
CALCIUM SERPL-MCNC: 9.4 MG/DL (ref 8.3–10.6)
CHLORIDE BLD-SCNC: 103 MMOL/L (ref 99–110)
CHOLESTEROL, FASTING: 168 MG/DL (ref 0–199)
CO2: 27 MMOL/L (ref 21–32)
CREAT SERPL-MCNC: 0.9 MG/DL (ref 0.6–1.2)
ESTIMATED AVERAGE GLUCOSE: 122.6 MG/DL
GFR AFRICAN AMERICAN: >60
GFR NON-AFRICAN AMERICAN: >60
GLOBULIN: 3.1 G/DL
GLUCOSE FASTING: 107 MG/DL (ref 70–99)
HBA1C MFR BLD: 5.9 %
HDLC SERPL-MCNC: 45 MG/DL (ref 40–60)
LDL CHOLESTEROL CALCULATED: 101 MG/DL
POTASSIUM SERPL-SCNC: 4 MMOL/L (ref 3.5–5.1)
SODIUM BLD-SCNC: 144 MMOL/L (ref 136–145)
TOTAL PROTEIN: 7.5 G/DL (ref 6.4–8.2)
TRIGLYCERIDE, FASTING: 110 MG/DL (ref 0–150)
TSH REFLEX: 0.37 UIU/ML (ref 0.27–4.2)
VLDLC SERPL CALC-MCNC: 22 MG/DL

## 2019-06-04 PROCEDURE — 80061 LIPID PANEL: CPT

## 2019-06-04 PROCEDURE — 36415 COLL VENOUS BLD VENIPUNCTURE: CPT

## 2019-06-04 PROCEDURE — 83036 HEMOGLOBIN GLYCOSYLATED A1C: CPT

## 2019-06-04 PROCEDURE — 84443 ASSAY THYROID STIM HORMONE: CPT

## 2019-06-04 PROCEDURE — 80053 COMPREHEN METABOLIC PANEL: CPT

## 2019-06-05 ENCOUNTER — TELEPHONE (OUTPATIENT)
Dept: INTERNAL MEDICINE CLINIC | Age: 69
End: 2019-06-05

## 2019-06-05 NOTE — TELEPHONE ENCOUNTER
Spoke with Nilsa Jim to obtain more information on message. Patient is needing to drop off medical records request but it has to be done by a certain time and she was scared it was not going to be completed by requested date. Nilsa Jim advised patient to drop off the request as soon as possible to assure completion by date requested.  No need to contact patient per Nilsa Jim

## 2019-06-05 NOTE — TELEPHONE ENCOUNTER
Patient requesting a call back to discuss some medical information and forms. Patient verbalized being upset. Patient advised to drop off records request forms at the office. Patient can be reached at phone number provided.

## 2019-06-07 ENCOUNTER — OFFICE VISIT (OUTPATIENT)
Dept: INTERNAL MEDICINE CLINIC | Age: 69
End: 2019-06-07
Payer: COMMERCIAL

## 2019-06-07 VITALS
HEART RATE: 76 BPM | WEIGHT: 189 LBS | DIASTOLIC BLOOD PRESSURE: 82 MMHG | BODY MASS INDEX: 30.05 KG/M2 | SYSTOLIC BLOOD PRESSURE: 120 MMHG

## 2019-06-07 DIAGNOSIS — S12.101A CLOSED NONDISPLACED FRACTURE OF SECOND CERVICAL VERTEBRA, UNSPECIFIED FRACTURE MORPHOLOGY, INITIAL ENCOUNTER (HCC): ICD-10-CM

## 2019-06-07 DIAGNOSIS — F43.10 POST TRAUMATIC STRESS DISORDER: ICD-10-CM

## 2019-06-07 DIAGNOSIS — G44.321 INTRACTABLE CHRONIC POST-TRAUMATIC HEADACHE: Primary | ICD-10-CM

## 2019-06-07 PROCEDURE — 1090F PRES/ABSN URINE INCON ASSESS: CPT | Performed by: INTERNAL MEDICINE

## 2019-06-07 PROCEDURE — G8400 PT W/DXA NO RESULTS DOC: HCPCS | Performed by: INTERNAL MEDICINE

## 2019-06-07 PROCEDURE — 1036F TOBACCO NON-USER: CPT | Performed by: INTERNAL MEDICINE

## 2019-06-07 PROCEDURE — 4040F PNEUMOC VAC/ADMIN/RCVD: CPT | Performed by: INTERNAL MEDICINE

## 2019-06-07 PROCEDURE — G8417 CALC BMI ABV UP PARAM F/U: HCPCS | Performed by: INTERNAL MEDICINE

## 2019-06-07 PROCEDURE — 3017F COLORECTAL CA SCREEN DOC REV: CPT | Performed by: INTERNAL MEDICINE

## 2019-06-07 PROCEDURE — G8427 DOCREV CUR MEDS BY ELIG CLIN: HCPCS | Performed by: INTERNAL MEDICINE

## 2019-06-07 PROCEDURE — 1123F ACP DISCUSS/DSCN MKR DOCD: CPT | Performed by: INTERNAL MEDICINE

## 2019-06-07 PROCEDURE — 99214 OFFICE O/P EST MOD 30 MIN: CPT | Performed by: INTERNAL MEDICINE

## 2019-06-07 RX ORDER — ESCITALOPRAM OXALATE 10 MG/1
10 TABLET ORAL DAILY
Qty: 30 TABLET | Refills: 3 | Status: SHIPPED | OUTPATIENT
Start: 2019-06-07 | End: 2022-06-06 | Stop reason: SDUPTHER

## 2019-06-07 NOTE — LETTER
[address] [place] [id number]  Phone: [phone]  Fax: [phone]    Arnol Zaldivar MD        June 7, 2019     Patient: Sharyle Earing   YOB: 1950   Date of Visit: 6/7/2019       To Whom It May Concern: It is my medical opinion that Sharyle Earing requires a disability parking placard for the following reasons:  She has limited walking ability due to an arthritic condition. Duration of need: 5 years    If you have any questions or concerns, please don't hesitate to call.     Sincerely,        Arnol Zaldivar MD
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
## 2019-06-11 ENCOUNTER — OFFICE VISIT (OUTPATIENT)
Dept: PSYCHOLOGY | Age: 69
End: 2019-06-11
Payer: COMMERCIAL

## 2019-06-11 DIAGNOSIS — F43.10 PTSD (POST-TRAUMATIC STRESS DISORDER): Primary | ICD-10-CM

## 2019-06-11 PROCEDURE — 90832 PSYTX W PT 30 MINUTES: CPT | Performed by: PSYCHOLOGIST

## 2019-06-11 NOTE — PROGRESS NOTES
Behavioral Health Consultation  Raúl Chavez, Ph.D.  Psychologist  2019        1:01 PM    Time spent with Patient: 30 minutes  This is patient's 5th [facility] appointment. Reason for Consult:        Chief Complaint   Patient presents with    Anxiety      Referring Provider: Mia Partida MD  [address] [facility], [address]       Feedback given to PCP. S:  Pt seen per PCP re: anxiety     Pt seen for f/u. 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 Pt noted that she reached out to her EAP about extended time off as she feels she is unable to work d/t the pain. Pt reported that she experiences increased anxiety when the pain is difficult to manage because she worries that something is being missed in her care. Pt reported an increase in posttraumatic stress sxs as she has frequently seen the number 3108 in the recent weeks which reminds her of the time of death of the man in the hospital who  next to her. Pt noted efforts to avoid this number. Used cognitive restructuring and perspective taking to reframe meaning of this number with success. Pt has stopped engaging in the in between exposure exercises. Set plan for Pt to re-engage.        O:  MSE:    Appearance    alert, cooperative, mild distress  Impulsive behavior No  Speech    normal rate, normal volume and well articulated  Mood    Anxious  Depressed  Affect    anxiety  Thought Content    intact  Thought Process    linear and coherent  Associations    logical connections  Insight    Good  Judgment    Intact  Orientation    oriented to person, place, time, and general circumstances  Memory    recent and remote memory intact  Attention/Concentration    intact  Morbid ideation No  Suicide Assessment    no suicidal ideation    History:    Social History:   Social History     Socioeconomic History    Marital status: Single     Spouse name: Not on file    Number of children: 2    Years of education:

## 2019-07-03 ENCOUNTER — PATIENT MESSAGE (OUTPATIENT)
Dept: INTERNAL MEDICINE CLINIC | Age: 69
End: 2019-07-03

## 2019-07-03 DIAGNOSIS — Z78.0 POSTMENOPAUSAL STATUS: Primary | ICD-10-CM

## 2019-07-10 ENCOUNTER — HOSPITAL ENCOUNTER (OUTPATIENT)
Dept: GENERAL RADIOLOGY | Age: 69
Discharge: HOME | End: 2019-07-10
Payer: COMMERCIAL

## 2019-07-10 DIAGNOSIS — Z78.0 POSTMENOPAUSAL STATUS: ICD-10-CM

## 2019-07-10 PROCEDURE — 77080 DXA BONE DENSITY AXIAL: CPT

## 2019-07-16 ENCOUNTER — OFFICE VISIT (OUTPATIENT)
Dept: ENT CLINIC | Age: 69
End: 2019-07-16
Payer: COMMERCIAL

## 2019-07-16 VITALS — DIASTOLIC BLOOD PRESSURE: 86 MMHG | HEART RATE: 80 BPM | OXYGEN SATURATION: 94 % | SYSTOLIC BLOOD PRESSURE: 136 MMHG

## 2019-07-16 DIAGNOSIS — H61.21 IMPACTED CERUMEN OF RIGHT EAR: ICD-10-CM

## 2019-07-16 DIAGNOSIS — H69.93 EUSTACHIAN TUBE DISORDER, BILATERAL: Primary | ICD-10-CM

## 2019-07-16 PROCEDURE — G8427 DOCREV CUR MEDS BY ELIG CLIN: HCPCS | Performed by: OTOLARYNGOLOGY

## 2019-07-16 PROCEDURE — 99202 OFFICE O/P NEW SF 15 MIN: CPT | Performed by: OTOLARYNGOLOGY

## 2019-07-16 PROCEDURE — 1090F PRES/ABSN URINE INCON ASSESS: CPT | Performed by: OTOLARYNGOLOGY

## 2019-07-16 PROCEDURE — 69210 REMOVE IMPACTED EAR WAX UNI: CPT | Performed by: OTOLARYNGOLOGY

## 2019-07-16 PROCEDURE — G8417 CALC BMI ABV UP PARAM F/U: HCPCS | Performed by: OTOLARYNGOLOGY

## 2019-07-16 PROCEDURE — G8399 PT W/DXA RESULTS DOCUMENT: HCPCS | Performed by: OTOLARYNGOLOGY

## 2019-07-16 PROCEDURE — 1123F ACP DISCUSS/DSCN MKR DOCD: CPT | Performed by: OTOLARYNGOLOGY

## 2019-07-16 PROCEDURE — 4040F PNEUMOC VAC/ADMIN/RCVD: CPT | Performed by: OTOLARYNGOLOGY

## 2019-07-16 PROCEDURE — 3017F COLORECTAL CA SCREEN DOC REV: CPT | Performed by: OTOLARYNGOLOGY

## 2019-07-16 PROCEDURE — 1036F TOBACCO NON-USER: CPT | Performed by: OTOLARYNGOLOGY

## 2019-07-16 RX ORDER — FLUTICASONE PROPIONATE 50 MCG
1 SPRAY, SUSPENSION (ML) NASAL DAILY
Qty: 1 BOTTLE | Refills: 0 | Status: SHIPPED | OUTPATIENT
Start: 2019-07-16

## 2019-10-08 ENCOUNTER — OFFICE VISIT (OUTPATIENT)
Dept: INTERNAL MEDICINE CLINIC | Age: 69
End: 2019-10-08
Payer: MEDICARE

## 2019-10-08 VITALS
DIASTOLIC BLOOD PRESSURE: 68 MMHG | SYSTOLIC BLOOD PRESSURE: 124 MMHG | HEART RATE: 75 BPM | BODY MASS INDEX: 31.02 KG/M2 | HEIGHT: 66 IN | OXYGEN SATURATION: 100 % | WEIGHT: 193 LBS

## 2019-10-08 DIAGNOSIS — E78.00 PURE HYPERCHOLESTEROLEMIA: ICD-10-CM

## 2019-10-08 DIAGNOSIS — Z00.00 ROUTINE GENERAL MEDICAL EXAMINATION AT A HEALTH CARE FACILITY: Primary | ICD-10-CM

## 2019-10-08 DIAGNOSIS — R73.9 HYPERGLYCEMIA: ICD-10-CM

## 2019-10-08 DIAGNOSIS — E03.4 HYPOTHYROIDISM DUE TO ACQUIRED ATROPHY OF THYROID: ICD-10-CM

## 2019-10-08 PROCEDURE — 3017F COLORECTAL CA SCREEN DOC REV: CPT | Performed by: INTERNAL MEDICINE

## 2019-10-08 PROCEDURE — G0008 ADMIN INFLUENZA VIRUS VAC: HCPCS | Performed by: INTERNAL MEDICINE

## 2019-10-08 PROCEDURE — G0447 BEHAVIOR COUNSEL OBESITY 15M: HCPCS | Performed by: INTERNAL MEDICINE

## 2019-10-08 PROCEDURE — G0402 INITIAL PREVENTIVE EXAM: HCPCS | Performed by: INTERNAL MEDICINE

## 2019-10-08 PROCEDURE — 1123F ACP DISCUSS/DSCN MKR DOCD: CPT | Performed by: INTERNAL MEDICINE

## 2019-10-08 PROCEDURE — 90653 IIV ADJUVANT VACCINE IM: CPT | Performed by: INTERNAL MEDICINE

## 2019-10-08 PROCEDURE — 4040F PNEUMOC VAC/ADMIN/RCVD: CPT | Performed by: INTERNAL MEDICINE

## 2019-10-08 RX ORDER — CHOLECALCIFEROL (VITAMIN D3) 125 MCG
500 CAPSULE ORAL DAILY
COMMUNITY

## 2019-10-08 ASSESSMENT — LIFESTYLE VARIABLES: HOW OFTEN DO YOU HAVE A DRINK CONTAINING ALCOHOL: 0

## 2019-10-08 ASSESSMENT — PATIENT HEALTH QUESTIONNAIRE - PHQ9
SUM OF ALL RESPONSES TO PHQ QUESTIONS 1-9: 0
SUM OF ALL RESPONSES TO PHQ QUESTIONS 1-9: 0

## 2019-10-21 ENCOUNTER — TELEPHONE (OUTPATIENT)
Dept: ORTHOPEDIC SURGERY | Age: 69
End: 2019-10-21

## 2019-10-21 DIAGNOSIS — M79.644 FINGER PAIN, RIGHT: Primary | ICD-10-CM

## 2019-10-30 ENCOUNTER — PATIENT MESSAGE (OUTPATIENT)
Dept: INTERNAL MEDICINE CLINIC | Age: 69
End: 2019-10-30

## 2019-10-30 RX ORDER — FLUCONAZOLE 150 MG/1
150 TABLET ORAL ONCE
Qty: 2 TABLET | Refills: 0 | Status: SHIPPED | OUTPATIENT
Start: 2019-10-30 | End: 2019-10-30

## 2019-11-01 RX ORDER — HYDROCHLOROTHIAZIDE 25 MG/1
TABLET ORAL
Qty: 90 TABLET | Refills: 1 | Status: SHIPPED | OUTPATIENT
Start: 2019-11-01 | End: 2020-04-09

## 2019-11-01 RX ORDER — SIMVASTATIN 40 MG
TABLET ORAL
Qty: 90 TABLET | Refills: 1 | Status: SHIPPED | OUTPATIENT
Start: 2019-11-01 | End: 2020-05-26 | Stop reason: SDUPTHER

## 2019-12-23 ENCOUNTER — PATIENT MESSAGE (OUTPATIENT)
Dept: INTERNAL MEDICINE CLINIC | Age: 69
End: 2019-12-23

## 2019-12-26 RX ORDER — FLUCONAZOLE 150 MG/1
150 TABLET ORAL ONCE
Qty: 2 TABLET | Refills: 0 | Status: SHIPPED | OUTPATIENT
Start: 2019-12-26 | End: 2020-12-02 | Stop reason: SDUPTHER

## 2020-01-30 PROBLEM — M48.061 LUMBAR SPINAL STENOSIS: Status: ACTIVE | Noted: 2020-01-30

## 2020-02-07 ENCOUNTER — OFFICE VISIT (OUTPATIENT)
Dept: INTERNAL MEDICINE CLINIC | Age: 70
End: 2020-02-07
Payer: MEDICARE

## 2020-02-07 VITALS
DIASTOLIC BLOOD PRESSURE: 72 MMHG | SYSTOLIC BLOOD PRESSURE: 124 MMHG | BODY MASS INDEX: 31.31 KG/M2 | HEART RATE: 80 BPM | WEIGHT: 194 LBS

## 2020-02-07 PROBLEM — T30.0 BURN: Status: ACTIVE | Noted: 2020-02-07
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
## 2020-02-07 PROCEDURE — G8427 DOCREV CUR MEDS BY ELIG CLIN: HCPCS | Performed by: INTERNAL MEDICINE

## 2020-02-07 PROCEDURE — 4040F PNEUMOC VAC/ADMIN/RCVD: CPT | Performed by: INTERNAL MEDICINE

## 2020-02-07 PROCEDURE — 1036F TOBACCO NON-USER: CPT | Performed by: INTERNAL MEDICINE

## 2020-02-07 PROCEDURE — 1123F ACP DISCUSS/DSCN MKR DOCD: CPT | Performed by: INTERNAL MEDICINE

## 2020-02-07 PROCEDURE — G8417 CALC BMI ABV UP PARAM F/U: HCPCS | Performed by: INTERNAL MEDICINE

## 2020-02-07 PROCEDURE — G8482 FLU IMMUNIZE ORDER/ADMIN: HCPCS | Performed by: INTERNAL MEDICINE

## 2020-02-07 PROCEDURE — 99214 OFFICE O/P EST MOD 30 MIN: CPT | Performed by: INTERNAL MEDICINE

## 2020-02-07 PROCEDURE — 1090F PRES/ABSN URINE INCON ASSESS: CPT | Performed by: INTERNAL MEDICINE

## 2020-02-07 PROCEDURE — G8399 PT W/DXA RESULTS DOCUMENT: HCPCS | Performed by: INTERNAL MEDICINE

## 2020-02-07 PROCEDURE — 3017F COLORECTAL CA SCREEN DOC REV: CPT | Performed by: INTERNAL MEDICINE

## 2020-02-07 RX ORDER — GABAPENTIN 100 MG/1
100 CAPSULE ORAL 3 TIMES DAILY
COMMUNITY
End: 2020-07-23

## 2020-02-07 ASSESSMENT — PATIENT HEALTH QUESTIONNAIRE - PHQ9
2. FEELING DOWN, DEPRESSED OR HOPELESS: 0
SUM OF ALL RESPONSES TO PHQ QUESTIONS 1-9: 0
SUM OF ALL RESPONSES TO PHQ9 QUESTIONS 1 & 2: 0
1. LITTLE INTEREST OR PLEASURE IN DOING THINGS: 0
SUM OF ALL RESPONSES TO PHQ QUESTIONS 1-9: 0
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
## 2020-02-21 RX ORDER — ZOLPIDEM TARTRATE 5 MG/1
TABLET ORAL
Qty: 30 TABLET | Refills: 0 | Status: CANCELLED | OUTPATIENT
Start: 2020-02-21

## 2020-02-28 ENCOUNTER — HOSPITAL ENCOUNTER (OUTPATIENT)
Age: 70
Discharge: HOME | End: 2020-02-28
Payer: MEDICARE

## 2020-02-28 ENCOUNTER — HOSPITAL ENCOUNTER (OUTPATIENT)
Dept: GENERAL RADIOLOGY | Age: 70
Discharge: HOME | End: 2020-02-28
Payer: MEDICARE

## 2020-02-28 PROCEDURE — 72114 X-RAY EXAM L-S SPINE BENDING: CPT

## 2020-04-09 RX ORDER — HYDROCHLOROTHIAZIDE 25 MG/1
TABLET ORAL
Qty: 90 TABLET | Refills: 1 | Status: SHIPPED | OUTPATIENT
Start: 2020-04-09 | End: 2020-09-22

## 2020-04-09 RX ORDER — LEVOTHYROXINE SODIUM 0.15 MG/1
TABLET ORAL
Qty: 90 TABLET | Refills: 0 | Status: SHIPPED | OUTPATIENT
Start: 2020-04-09 | End: 2020-06-29

## 2020-04-14 ENCOUNTER — TELEMEDICINE (OUTPATIENT)
Dept: INTERNAL MEDICINE CLINIC | Age: 70
End: 2020-04-14
Payer: MEDICARE

## 2020-04-14 PROBLEM — M79.644 PAIN OF FINGER OF RIGHT HAND: Status: RESOLVED | Noted: 2019-02-27 | Resolved: 2020-04-14

## 2020-04-14 PROBLEM — I77.74 DISSECTION OF VERTEBRAL ARTERY (HCC): Status: RESOLVED | Noted: 2018-12-31 | Resolved: 2020-04-14

## 2020-04-14 PROBLEM — T30.0 BURN: Status: RESOLVED | Noted: 2020-02-07 | Resolved: 2020-04-14

## 2020-04-14 PROBLEM — S12.100A FRACTURE OF SECOND CERVICAL VERTEBRA (HCC): Status: RESOLVED | Noted: 2019-01-07 | Resolved: 2020-04-14

## 2020-04-14 PROCEDURE — 1123F ACP DISCUSS/DSCN MKR DOCD: CPT | Performed by: INTERNAL MEDICINE

## 2020-04-14 PROCEDURE — G8427 DOCREV CUR MEDS BY ELIG CLIN: HCPCS | Performed by: INTERNAL MEDICINE

## 2020-04-14 PROCEDURE — G8399 PT W/DXA RESULTS DOCUMENT: HCPCS | Performed by: INTERNAL MEDICINE

## 2020-04-14 PROCEDURE — 3017F COLORECTAL CA SCREEN DOC REV: CPT | Performed by: INTERNAL MEDICINE

## 2020-04-14 PROCEDURE — 99214 OFFICE O/P EST MOD 30 MIN: CPT | Performed by: INTERNAL MEDICINE

## 2020-04-14 PROCEDURE — 4040F PNEUMOC VAC/ADMIN/RCVD: CPT | Performed by: INTERNAL MEDICINE

## 2020-04-14 PROCEDURE — 1090F PRES/ABSN URINE INCON ASSESS: CPT | Performed by: INTERNAL MEDICINE

## 2020-04-14 RX ORDER — LOSARTAN POTASSIUM 50 MG/1
50 TABLET ORAL DAILY
Qty: 30 TABLET | Refills: 0 | Status: SHIPPED | OUTPATIENT
Start: 2020-04-14 | End: 2020-05-13 | Stop reason: SDUPTHER

## 2020-04-14 RX ORDER — ASPIRIN 325 MG
325 TABLET ORAL
COMMUNITY
End: 2022-02-22 | Stop reason: ALTCHOICE

## 2020-04-14 RX ORDER — METHOCARBAMOL 500 MG/1
750 TABLET, FILM COATED ORAL 2 TIMES DAILY PRN
COMMUNITY

## 2020-04-14 RX ORDER — LOSARTAN POTASSIUM 50 MG/1
50 TABLET ORAL DAILY
Qty: 90 TABLET | OUTPATIENT
Start: 2020-04-14

## 2020-04-14 NOTE — PROGRESS NOTES
2020    TELEHEALTH EVALUATION -- Audio/Visual (During PAQGC-14 public health emergency)    HPI:  Morteza Reynolds (:  1950) has requested an audio/video evaluation for the following concern(s):    Hypertension:  Home blood pressure monitorin-150/low 90s .   She is adherent to a low sodium diet. Patient denies chest pain, shortness of breath,  lightheadedness and palpitations.  Antihypertensive medication side effects: no medication side effects noted.  Use of agents associated with hypertension: Advil 1200 mg qd.       Hyperlipidemia/hyperglycemia:  No new myalgias or GI upset on simvastatin (Zocor). Medication compliance: compliant all of the time. Patient is following a low fat, low cholesterol diet, and has cut back on simple carbs.  She is exercising regularly- treadmill or exercise bicycle qod for 30 minutes. No polyuria and polydipsia.     Hypothyroidism: Recent symptoms: none. She denies fatigue, weight changes, cold intolerance, heat intolerance, hair loss, dry skin, constipation, diarrhea and palpitations. Patient is  taking her medication consistently on an empty stomach. Review of Systems:   As documented in HPI     Prior to Visit Medications    Medication Sig Taking? Authorizing Provider   methocarbamol (ROBAXIN) 500 MG tablet Take 750 mg by mouth 2 times daily as needed Yes Historical Provider, MD   hydroCHLOROthiazide (HYDRODIURIL) 25 MG tablet TAKE 1 TABLET BY MOUTH  DAILY Yes Shalonda White MD   levothyroxine (SYNTHROID) 150 MCG tablet TAKE 1 TABLET BY MOUTH  DAILY Yes Shalonda White MD   gabapentin (NEURONTIN) 100 MG capsule Take 100 mg by mouth 3 times daily.   Yes Historical Provider, MD   simvastatin (ZOCOR) 40 MG tablet TAKE 1 TABLET BY MOUTH  NIGHTLY Yes Felecia Jimenes MD   vitamin B-12 (CYANOCOBALAMIN) 500 MCG tablet Take 500 mcg by mouth daily Yes Historical Provider, MD   Multiple Vitamins-Minerals (MULTIVITAMIN ADULT PO) Take by mouth Yes Historical Mood normal.         Speech: Speech normal.         Behavior: Behavior normal.         Thought Content: Thought content normal.         Cognition and Memory: Cognition normal.          Lab Results   Component Value Date    CHOLFAST 168 06/04/2019    TRIGLYCFAST 110 06/04/2019    HDL 45 06/04/2019    LDLCALC 101 (H) 06/04/2019     Lab Results   Component Value Date    GLUF 107 (H) 06/04/2019    LABA1C 5.9 06/04/2019     Lab Results   Component Value Date     06/04/2019    K 4.0 06/04/2019    BUN 16 06/04/2019    CREATININE 0.9 06/04/2019    LABGLOM >60 06/04/2019    GFRAA >60 06/04/2019    CALCIUM 9.4 06/04/2019     Lab Results   Component Value Date    ALT 16 06/04/2019    AST 23 06/04/2019     No results found for: HGB  Lab Results   Component Value Date    TSHREFLEX 0.37 06/04/2019    TSH 0.69 08/03/2018           ASSESSMENT/PLAN:  1. Essential hypertension  Inadequate control, likely due to high dose ibuprofen, which she needs to control her back pain, since unable to obtain injections with COVID-19 crisis. Will add losartan 50 mg qd. Continue current dose of HCTZ. She will continue to monitor her BP at home and have readings available for next visit. She will have labs drawn in about 2 weeks and will hold K+ supplement until results available. 2. Pure hypercholesterolemia  Tolerating current dose(s) of simvastatin- continue. 3. Hyperglycemia  Importance of continued lifestyle changes stressed to help prevent progression to diabetes. 4. Hypothyroidism due to acquired atrophy of thyroid  Clinically euthyroid, but will adjust levothyroxine dose if indicated by lab results. Return in about 2 weeks (around 4/28/2020) for 30 MIN F/U. An  electronic signature was used to authenticate this note.     --Arpita Pichardo MD on 4/14/2020 at 9:13 AM    Pursuant to the emergency declaration under the [facility] 1135 waiver authority and the Coronavirus Preparedness and Response Supplemental Appropriations Act, this Virtual  Visit was conducted, with patient's consent, to reduce the patient's risk of exposure to COVID-19 and provide continuity of care for an established patient. Services were provided through a video synchronous discussion virtually to substitute for in-person clinic visit.

## 2020-05-08 ENCOUNTER — HOSPITAL ENCOUNTER (OUTPATIENT)
Age: 70
Discharge: HOME | End: 2020-05-08
Payer: MEDICARE

## 2020-05-08 LAB
A/G RATIO: 1.5 (ref 1.1–2.2)
ALBUMIN SERPL-MCNC: 4.1 G/DL (ref 3.4–5)
ALP BLD-CCNC: 66 U/L (ref 40–129)
ALT SERPL-CCNC: 21 U/L (ref 10–40)
ANION GAP SERPL CALCULATED.3IONS-SCNC: 10 MMOL/L (ref 3–16)
AST SERPL-CCNC: 25 U/L (ref 15–37)
BILIRUB SERPL-MCNC: 0.7 MG/DL (ref 0–1)
BUN BLDV-MCNC: 16 MG/DL (ref 7–20)
CALCIUM SERPL-MCNC: 9.5 MG/DL (ref 8.3–10.6)
CHLORIDE BLD-SCNC: 102 MMOL/L (ref 99–110)
CHOLESTEROL, FASTING: 139 MG/DL (ref 0–199)
CO2: 29 MMOL/L (ref 21–32)
CREAT SERPL-MCNC: 0.8 MG/DL (ref 0.6–1.2)
GFR AFRICAN AMERICAN: >60
GFR NON-AFRICAN AMERICAN: >60
GLOBULIN: 2.8 G/DL
GLUCOSE FASTING: 102 MG/DL (ref 70–99)
HDLC SERPL-MCNC: 43 MG/DL (ref 40–60)
LDL CHOLESTEROL CALCULATED: 75 MG/DL
POTASSIUM SERPL-SCNC: 3.7 MMOL/L (ref 3.5–5.1)
SODIUM BLD-SCNC: 141 MMOL/L (ref 136–145)
TOTAL PROTEIN: 6.9 G/DL (ref 6.4–8.2)
TRIGLYCERIDE, FASTING: 106 MG/DL (ref 0–150)
TSH SERPL DL<=0.05 MIU/L-ACNC: 0.67 UIU/ML (ref 0.27–4.2)
VLDLC SERPL CALC-MCNC: 21 MG/DL

## 2020-05-08 PROCEDURE — 36415 COLL VENOUS BLD VENIPUNCTURE: CPT

## 2020-05-08 PROCEDURE — 83036 HEMOGLOBIN GLYCOSYLATED A1C: CPT

## 2020-05-08 PROCEDURE — 84443 ASSAY THYROID STIM HORMONE: CPT

## 2020-05-08 PROCEDURE — 80053 COMPREHEN METABOLIC PANEL: CPT

## 2020-05-08 PROCEDURE — 80061 LIPID PANEL: CPT

## 2020-05-09 LAB
ESTIMATED AVERAGE GLUCOSE: 134.1 MG/DL
HBA1C MFR BLD: 6.3 %

## 2020-05-13 RX ORDER — LOSARTAN POTASSIUM 50 MG/1
50 TABLET ORAL DAILY
Qty: 30 TABLET | Refills: 0 | Status: SHIPPED | OUTPATIENT
Start: 2020-05-13 | End: 2020-05-15

## 2020-05-13 NOTE — TELEPHONE ENCOUNTER
Script for 30 day supply of medication with 2 refills sent to pharmacy, but will need appointment for further refills. Please call patient to schedule within 3 months.

## 2020-05-13 NOTE — TELEPHONE ENCOUNTER
Last appointment: 2/7/2020  Next appointment: Visit date not found  Last refill: 04/14/2020 # 30  No refill

## 2020-05-15 RX ORDER — LOSARTAN POTASSIUM 50 MG/1
50 TABLET ORAL DAILY
Qty: 30 TABLET | Refills: 5 | Status: SHIPPED | OUTPATIENT
Start: 2020-05-15 | End: 2020-09-08 | Stop reason: SDUPTHER

## 2020-05-25 ENCOUNTER — PATIENT MESSAGE (OUTPATIENT)
Dept: INTERNAL MEDICINE CLINIC | Age: 70
End: 2020-05-25

## 2020-05-26 RX ORDER — SIMVASTATIN 40 MG
TABLET ORAL
Qty: 90 TABLET | Refills: 1 | Status: SHIPPED | OUTPATIENT
Start: 2020-05-26 | End: 2020-09-15 | Stop reason: SDUPTHER

## 2020-05-26 NOTE — TELEPHONE ENCOUNTER
From: Silas Councilman  To: Mica Ramirez MD  Sent: 5/25/2020 11:58 AM EDT  Subject: Prescription Question    Hello    Im contacting [facility] to refill my Simvastatin tab 40mg - 90 day quantity. Just giving a heads up!     these prescriptions should continue to be filled by [facility] (cholesterol, hydrochlorothiaz & levothyroxine)    I appreciate everything, keep safe and enjoy the holiday    [place]

## 2020-06-29 RX ORDER — LEVOTHYROXINE SODIUM 0.15 MG/1
TABLET ORAL
Qty: 90 TABLET | Refills: 1 | Status: SHIPPED | OUTPATIENT
Start: 2020-06-29 | End: 2020-12-02

## 2020-07-10 ENCOUNTER — PATIENT MESSAGE (OUTPATIENT)
Dept: INTERNAL MEDICINE CLINIC | Age: 70
End: 2020-07-10

## 2020-07-10 NOTE — TELEPHONE ENCOUNTER
Krzysztof: Jennifer Smith  Sent: 7/10/2020 12:46 PM EDT  To: [facility] Support  Subject: RE: Visit Follow-Up Question    Yes the surgeon said a video visit will be fine because Dr. Brandie Oneill will fax that visit to the office! I will take the paperwork and go to adin Petersen to get the lab work done. Im sure you are well let me know the date for the video visit with Dr. Brandie Oneill.     Thanks   Deanne Mijares  Surgery is July 29 when would you like to do video visit

## 2020-07-20 ENCOUNTER — HOSPITAL ENCOUNTER (OUTPATIENT)
Age: 70
Discharge: HOME | End: 2020-07-20
Payer: MEDICARE

## 2020-07-20 LAB
ALBUMIN SERPL-MCNC: 4.2 G/DL (ref 3.4–5)
ANION GAP SERPL CALCULATED.3IONS-SCNC: 11 MMOL/L (ref 3–16)
APTT: 33.1 SEC (ref 24.2–36.2)
BUN BLDV-MCNC: 17 MG/DL (ref 7–20)
CALCIUM SERPL-MCNC: 9 MG/DL (ref 8.3–10.6)
CHLORIDE BLD-SCNC: 101 MMOL/L (ref 99–110)
CO2: 27 MMOL/L (ref 21–32)
CREAT SERPL-MCNC: 0.7 MG/DL (ref 0.6–1.2)
EKG ATRIAL RATE: 76 BPM
EKG DIAGNOSIS: NORMAL
EKG P AXIS: 24 DEGREES
EKG P-R INTERVAL: 182 MS
EKG Q-T INTERVAL: 406 MS
EKG QRS DURATION: 78 MS
EKG QTC CALCULATION (BAZETT): 456 MS
EKG R AXIS: -19 DEGREES
EKG T AXIS: 19 DEGREES
EKG VENTRICULAR RATE: 76 BPM
GFR AFRICAN AMERICAN: >60
GFR NON-AFRICAN AMERICAN: >60
GLUCOSE BLD-MCNC: 106 MG/DL (ref 70–99)
HCT VFR BLD CALC: 43.8 % (ref 36–48)
HEMOGLOBIN: 14.6 G/DL (ref 12–16)
INR BLD: 1.04 (ref 0.86–1.14)
MCH RBC QN AUTO: 31.1 PG (ref 26–34)
MCHC RBC AUTO-ENTMCNC: 33.2 G/DL (ref 31–36)
MCV RBC AUTO: 93.4 FL (ref 80–100)
PDW BLD-RTO: 12.6 % (ref 12.4–15.4)
PHOSPHORUS: 4 MG/DL (ref 2.5–4.9)
PLATELET # BLD: 200 K/UL (ref 135–450)
PMV BLD AUTO: 8.8 FL (ref 5–10.5)
POTASSIUM SERPL-SCNC: 4.1 MMOL/L (ref 3.5–5.1)
PROTHROMBIN TIME: 12.1 SEC (ref 10–13.2)
RBC # BLD: 4.68 M/UL (ref 4–5.2)
SODIUM BLD-SCNC: 139 MMOL/L (ref 136–145)
WBC # BLD: 5 K/UL (ref 4–11)

## 2020-07-20 PROCEDURE — 93010 ELECTROCARDIOGRAM REPORT: CPT | Performed by: INTERNAL MEDICINE

## 2020-07-20 PROCEDURE — 85027 COMPLETE CBC AUTOMATED: CPT

## 2020-07-20 PROCEDURE — 36415 COLL VENOUS BLD VENIPUNCTURE: CPT

## 2020-07-20 PROCEDURE — 85610 PROTHROMBIN TIME: CPT

## 2020-07-20 PROCEDURE — 93005 ELECTROCARDIOGRAM TRACING: CPT | Performed by: NEUROLOGICAL SURGERY

## 2020-07-20 PROCEDURE — 85730 THROMBOPLASTIN TIME PARTIAL: CPT

## 2020-07-20 PROCEDURE — 80069 RENAL FUNCTION PANEL: CPT
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
## 2020-07-23 ENCOUNTER — OFFICE VISIT (OUTPATIENT)
Dept: INTERNAL MEDICINE CLINIC | Age: 70
End: 2020-07-23
Payer: MEDICARE

## 2020-07-23 ENCOUNTER — TELEPHONE (OUTPATIENT)
Dept: DERMATOLOGY | Age: 70
End: 2020-07-23

## 2020-07-23 VITALS
RESPIRATION RATE: 16 BRPM | BODY MASS INDEX: 30.7 KG/M2 | OXYGEN SATURATION: 97 % | DIASTOLIC BLOOD PRESSURE: 76 MMHG | WEIGHT: 191 LBS | HEART RATE: 78 BPM | HEIGHT: 66 IN | SYSTOLIC BLOOD PRESSURE: 110 MMHG | TEMPERATURE: 97.5 F

## 2020-07-23 PROCEDURE — G8417 CALC BMI ABV UP PARAM F/U: HCPCS | Performed by: INTERNAL MEDICINE

## 2020-07-23 PROCEDURE — 93000 ELECTROCARDIOGRAM COMPLETE: CPT | Performed by: INTERNAL MEDICINE

## 2020-07-23 PROCEDURE — G8427 DOCREV CUR MEDS BY ELIG CLIN: HCPCS | Performed by: INTERNAL MEDICINE

## 2020-07-23 PROCEDURE — 1090F PRES/ABSN URINE INCON ASSESS: CPT | Performed by: INTERNAL MEDICINE

## 2020-07-23 PROCEDURE — 99213 OFFICE O/P EST LOW 20 MIN: CPT | Performed by: INTERNAL MEDICINE

## 2020-07-23 RX ORDER — DIPHENOXYLATE HYDROCHLORIDE AND ATROPINE SULFATE 2.5; .025 MG/1; MG/1
1 TABLET ORAL 4 TIMES DAILY PRN
Qty: 40 TABLET | Refills: 0 | Status: SHIPPED | OUTPATIENT
Start: 2020-07-23 | End: 2020-08-02

## 2020-07-23 ASSESSMENT — ENCOUNTER SYMPTOMS
SORE THROAT: 0
ABDOMINAL PAIN: 1
TROUBLE SWALLOWING: 0
BACK PAIN: 1
RHINORRHEA: 0
COUGH: 0
NAUSEA: 0
DIARRHEA: 1
VOMITING: 1
SHORTNESS OF BREATH: 0

## 2020-07-23 NOTE — Clinical Note
Send preop. Let patient know I cleared her for planned surgery.  Let us know if GI symptoms persist.
oriented to person, place, time and situation

## 2020-07-24 ENCOUNTER — TELEPHONE (OUTPATIENT)
Dept: INTERNAL MEDICINE CLINIC | Age: 70
End: 2020-07-24

## 2020-07-24 NOTE — TELEPHONE ENCOUNTER
Patient advised and has decided to cancel surgery for now. The injections are  working well and she would like to allow her body to heal a little more. She may reschedule in the future. Patient will contact surgeon's office to advise.

## 2020-07-24 NOTE — TELEPHONE ENCOUNTER
Let her know I spoke to her Neurologist and the neck- related concerns I had have been very stable. I cleared her for surgery. Please send over the preop.

## 2020-07-24 NOTE — TELEPHONE ENCOUNTER
Patient is calling to discuss her Preop. She says there were concerns that the surgery may not be approved. She is wanting a call back to discuss. Please call patient to discuss.

## 2020-07-27 NOTE — PROGRESS NOTES
Left message for patient to return call. On Friday, she had decided to cancel surgery calling to verify surgery cancelled and surgeon's office advised.

## 2020-08-12 NOTE — PROGRESS NOTES
(BACTROBAN) 2 % ointment Apply topically to affected area daily Yes Ryan Lobato MD   vitamin B-12 (CYANOCOBALAMIN) 500 MCG tablet Take 500 mcg by mouth daily Yes Historical Provider, MD   Multiple Vitamins-Minerals (MULTIVITAMIN ADULT PO) Take by mouth Yes Historical Provider, MD   fluticasone (FLONASE) 50 MCG/ACT nasal spray 1 spray by Nasal route daily Yes Dakotah Solorzano MD   escitalopram (LEXAPRO) 10 MG tablet Take 1 tablet by mouth daily  Patient taking differently: Take 15 mg by mouth daily  Yes Ryan Lobato MD   Calcium Carbonate-Vitamin D 600-125 MG-UNIT TABS Take by mouth Yes Historical Provider, MD   Cholecalciferol (VITAMIN D) 2000 UNITS CAPS capsule Take 1 capsule by mouth daily Yes Historical Provider, MD   Probiotic Product (PROBIOTIC ADVANCED PO) Take by mouth Yes Historical Provider, MD   aspirin 325 MG tablet Take 325 mg by mouth daily  Historical Provider, MD       Wt Readings from Last 3 Encounters:   07/23/20 191 lb (86.6 kg)   02/07/20 194 lb (88 kg)   10/08/19 193 lb (87.5 kg)     BP Readings from Last 3 Encounters:   07/23/20 110/76   02/07/20 124/72   10/08/19 124/68       Patient-Reported Vitals 8/7/2020   Patient-Reported Weight 191lb   Patient-Reported Systolic 228   Patient-Reported Diastolic 82   Patient-Reported Pulse 75         Review of Systems:   As documented in HPI     Physical Exam  Constitutional:       General: She is not in acute distress. Appearance: She is well-developed. HENT:      Head: Normocephalic and atraumatic. Mouth/Throat:      Lips: No lesions. Neck:      Comments: No visible mass  Pulmonary:      Effort: Pulmonary effort is normal. No respiratory distress. Skin:     Comments: No rash, erythema or other discoloration on facial skin and exposed areas of neck and upper extremites    Neurological:      Mental Status: She is alert.       Comments: No facial asymmetry (cranial nerve 7 motor function) or gaze palsy   Psychiatric:         Attention and Perception: Attention normal.         Mood and Affect: Mood normal.         Speech: Speech normal.         Behavior: Behavior normal.         Thought Content: Thought content normal.         Cognition and Memory: Cognition normal.          Lab Results   Component Value Date    CHOLFAST 139 05/08/2020    TRIGLYCFAST 106 05/08/2020    HDL 43 05/08/2020    LDLCALC 75 05/08/2020     Lab Results   Component Value Date    GLUF 102 (H) 05/08/2020    LABA1C 6.3 05/08/2020     Lab Results   Component Value Date     07/20/2020    K 4.1 07/20/2020    BUN 17 07/20/2020    CREATININE 0.7 07/20/2020    LABGLOM >60 07/20/2020    GFRAA >60 07/20/2020    CALCIUM 9.0 07/20/2020     Lab Results   Component Value Date    ALT 21 05/08/2020    AST 25 05/08/2020     Lab Results   Component Value Date    HGB 14.6 07/20/2020     Lab Results   Component Value Date    TSHREFLEX 0.37 06/04/2019    TSH 0.67 05/08/2020           ASSESSMENT/PLAN:  1. Essential hypertension  Well-controlled. Continue current medications. - Comprehensive Metabolic Panel, Fasting; Future    2. Pure hypercholesterolemia  At goal on current dose of Zocor- continue. 3. Hyperglycemia  Importance of continued lifestyle changes stressed to help prevent progression to diabetes. - Hemoglobin A1C; Future    4. Hypothyroidism due to acquired atrophy of thyroid  Clinically euthyroid, but will adjust levothyroxine dose if indicated by lab results.   - TSH without Reflex; Future    5. Anxiety  Except for driving, well-controlled on current dose of Lexapro- continue. Continue CBT to work on symptoms related to driving. 6. Post traumatic stress disorder  See plan for anxiety. Return in about 6 months (around 2/13/2021) for MEDICARE AWV- Video. An  electronic signature was used to authenticate this note.     --Mary Spencer MD on 8/13/2020 at 9:55 AM    Pursuant to the emergency declaration under the [facility], [facility] authority and the Coronavirus Preparedness and [employer] Act, this Virtual  Visit was conducted, with patient's consent, to reduce the patient's risk of exposure to COVID-19 and provide continuity of care for an established patient. Services were provided through a video synchronous discussion virtually to substitute for in-person clinic visit.

## 2020-08-13 ENCOUNTER — TELEMEDICINE (OUTPATIENT)
Dept: INTERNAL MEDICINE CLINIC | Age: 70
End: 2020-08-13
Payer: MEDICARE

## 2020-08-13 PROCEDURE — 99214 OFFICE O/P EST MOD 30 MIN: CPT | Performed by: INTERNAL MEDICINE

## 2020-08-13 PROCEDURE — 4040F PNEUMOC VAC/ADMIN/RCVD: CPT | Performed by: INTERNAL MEDICINE

## 2020-08-13 PROCEDURE — G8399 PT W/DXA RESULTS DOCUMENT: HCPCS | Performed by: INTERNAL MEDICINE

## 2020-08-13 PROCEDURE — G8427 DOCREV CUR MEDS BY ELIG CLIN: HCPCS | Performed by: INTERNAL MEDICINE

## 2020-08-13 PROCEDURE — 1090F PRES/ABSN URINE INCON ASSESS: CPT | Performed by: INTERNAL MEDICINE

## 2020-08-13 PROCEDURE — 1123F ACP DISCUSS/DSCN MKR DOCD: CPT | Performed by: INTERNAL MEDICINE

## 2020-08-13 PROCEDURE — 3017F COLORECTAL CA SCREEN DOC REV: CPT | Performed by: INTERNAL MEDICINE

## 2020-08-18 PROBLEM — M51.36 LUMBAR DEGENERATIVE DISC DISEASE: Status: ACTIVE | Noted: 2020-08-18

## 2020-08-18 PROBLEM — M47.816 LUMBAR FACET ARTHROPATHY: Status: ACTIVE | Noted: 2020-08-18

## 2020-08-18 PROBLEM — M51.369 LUMBAR DEGENERATIVE DISC DISEASE: Status: ACTIVE | Noted: 2020-08-18

## 2020-09-01 ENCOUNTER — OFFICE VISIT (OUTPATIENT)
Dept: DERMATOLOGY | Age: 70
End: 2020-09-01
Payer: MEDICARE

## 2020-09-01 VITALS — TEMPERATURE: 97.3 F

## 2020-09-01 PROCEDURE — 11200 RMVL SKIN TAGS UP TO&INC 15: CPT | Performed by: DERMATOLOGY

## 2020-09-01 PROCEDURE — 99203 OFFICE O/P NEW LOW 30 MIN: CPT | Performed by: DERMATOLOGY
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
## 2020-09-02 NOTE — PATIENT INSTRUCTIONS
Sunscreen information    1. Sunscreen should be broad-spectrum protection (protects against UVA and UVB rays), Sun Protection Factor (SPF) 50 or greater, and water-resistant. 2.  Apply the sunscreen to dry skin 15-30 minutes BEFORE going outdoors. Be sure to apply it generously to achieve the UV protection indicated on the product label. 3.  Re-apply sunscreen approximately every two hours or after swimming or sweating heavily according to the directions on the bottle. If the bottle specifies a lower water resistance time, then reapply according to those guidelines (ie water resistance of 60 minutes needs to be applied every 60 minutes). 4.  Skin cancer also can form on the lips. To protect your lips, apply a lip balm or lipstick that contains sunscreen with an SPF of 50 or higher. 5.  There are many types of sunscreen. A.  Creams are best for dry skin and the face. Neutrogena ultra sheer dry touch can be nice for the face. B.  Gels are good for hairy areas, such as the scalp or male chest.    C.  Sticks are good to use around the eyes. D. Sprays are sometimes preferred by parents since they are easy to apply to children, however sprays are NOT generally recommended due to the inability to fully cover most areas adequately. If you are using these, make sure to use enough of these products to cover the entire surface area thoroughly and rub in with your hands after spraying. Do NOT inhale these products or apply near heat, open flame or while smoking. E.  There also are sunscreens made for specific purposes, such as for sensitive skin and babies. Aveeno and Neutrogena are two examples. F. Daily facial moisturizers with spf can also be helpful (Aveeno positively radiant, Aveeno smart essentials, Cetaphil with sunscreen, etc). 6.  Wear protective clothing, such as a long-sleeved shirt, pants, a wide-brimmed hat and sunglasses, where possible.  There are even clothing lines that have special sun-protective clothing and hats such as Coolibar and [place] (Nanobiotix). 7.  Seek shade when appropriate, remembering that the sun's rays are strongest between 10 a.m. and 2 p.m. If your shadow is shorter than you are, seek shade. 8.  Use extra caution near water, snow and sand as they reflect the damaging rays of the sun, which can increase your chance of sunburn. 9.  Get vitamin D safely through a healthy diet that may include vitamin supplements. Don't seek the sun. 10.  Avoid tanning beds. Ultraviolet light from the sun and tanning beds can cause skin cancer and wrinkling. If you want to look tan, consider using a self-tanning product, but continue to use sunscreen with it. Patient Education        Epidermoid Cyst: Care Instructions  Your Care Instructions  An epidermoid (say \"rz-std-MJL-poornima\") cyst is a lump just under the skin. These cysts can form when a hair follicle becomes blocked. They are common in acne and may occur on the face, neck, back, and genitals. However, they can form anywhere on the body. These cysts are not cancer and do not lead to cancer. They tend not to hurt, but they can sometimes become swollen and painful. They also may break open (rupture) and cause scarring. These cysts sometimes do not cause problems and may not need treatment. If you have a cyst that is swollen and hurts, your doctor may inject it with a medicine to help it heal. But it is more likely that a painful cyst will need to be removed. Your doctor will give you a shot of numbing medicine and cut into the cyst to drain it or remove it. This makes the symptoms go away. Follow-up care is a key part of your treatment and safety. Be sure to make and go to all appointments, and call your doctor if you are having problems. It's also a good idea to know your test results and keep a list of the medicines you take. How can you care for yourself at home?   · Do not squeeze the cyst or poke it with a needle to open it. This can cause swelling, redness, and infection. · Always have a doctor look at any new lumps you get to make sure that they are not serious. When should you call for help? Watch closely for changes in your health, and be sure to contact your doctor if:  · You have a fever, redness, or swelling after you get a shot of medicine in the cyst.  · You see or feel a new lump on your skin. Where can you learn more? Go to [email or web address].[email or web address] org and sign in to your [employer] account. Enter Z614 in the [employer] box to learn more about \"Epidermoid Cyst: Care Instructions. \"     If you do not have an account, please click on the \"Sign Up Now\" link. Current as of: October 31, 2019               Content Version: 12.5  © 0339-1885 Healthwise, Incorporated. Care instructions adapted under license by [facility] ([facility]). If you have questions about a medical condition or this instruction, always ask your healthcare professional. [address] any warranty or liability for your use of this information.

## 2020-09-08 RX ORDER — LOSARTAN POTASSIUM 50 MG/1
50 TABLET ORAL DAILY
Qty: 90 TABLET | Refills: 1 | Status: SHIPPED | OUTPATIENT
Start: 2020-09-08 | End: 2021-02-03

## 2020-09-15 RX ORDER — SIMVASTATIN 40 MG
TABLET ORAL
Qty: 90 TABLET | Refills: 1 | Status: SHIPPED | OUTPATIENT
Start: 2020-09-15 | End: 2021-02-03

## 2020-09-22 RX ORDER — HYDROCHLOROTHIAZIDE 25 MG/1
TABLET ORAL
Qty: 90 TABLET | Refills: 1 | Status: SHIPPED | OUTPATIENT
Start: 2020-09-22 | End: 2021-05-10

## 2020-12-02 ENCOUNTER — E-VISIT (OUTPATIENT)
Dept: INTERNAL MEDICINE CLINIC | Age: 70
End: 2020-12-02
Payer: MEDICARE

## 2020-12-02 PROCEDURE — 99422 OL DIG E/M SVC 11-20 MIN: CPT | Performed by: INTERNAL MEDICINE

## 2020-12-02 RX ORDER — FLUCONAZOLE 150 MG/1
150 TABLET ORAL ONCE
Qty: 2 TABLET | Refills: 0 | Status: SHIPPED | OUTPATIENT
Start: 2020-12-02 | End: 2021-04-30 | Stop reason: SDUPTHER

## 2020-12-02 RX ORDER — DEXTROMETHORPHAN HYDROBROMIDE AND PROMETHAZINE HYDROCHLORIDE 15; 6.25 MG/5ML; MG/5ML
5 SYRUP ORAL NIGHTLY PRN
Qty: 1 BOTTLE | Refills: 0 | Status: SHIPPED | OUTPATIENT
Start: 2020-12-02 | End: 2020-12-09

## 2020-12-02 RX ORDER — AMOXICILLIN AND CLAVULANATE POTASSIUM 875; 125 MG/1; MG/1
1 TABLET, FILM COATED ORAL 2 TIMES DAILY
Qty: 20 TABLET | Refills: 0 | Status: SHIPPED | OUTPATIENT
Start: 2020-12-02 | End: 2021-04-30 | Stop reason: SDUPTHER

## 2020-12-02 RX ORDER — LEVOTHYROXINE SODIUM 0.15 MG/1
TABLET ORAL
Qty: 90 TABLET | Refills: 3 | Status: SHIPPED | OUTPATIENT
Start: 2020-12-02 | End: 2021-12-15 | Stop reason: SDUPTHER

## 2020-12-02 ASSESSMENT — LIFESTYLE VARIABLES: SMOKING_STATUS: NO, I'VE NEVER SMOKED

## 2020-12-03 ENCOUNTER — TELEPHONE (OUTPATIENT)
Dept: ADMINISTRATIVE | Age: 70
End: 2020-12-03

## 2021-02-03 RX ORDER — SIMVASTATIN 40 MG
TABLET ORAL
Qty: 90 TABLET | Refills: 1 | Status: SHIPPED | OUTPATIENT
Start: 2021-02-03 | End: 2021-12-10

## 2021-02-03 RX ORDER — LOSARTAN POTASSIUM 50 MG/1
50 TABLET ORAL DAILY
Qty: 90 TABLET | Refills: 1 | Status: SHIPPED | OUTPATIENT
Start: 2021-02-03 | End: 2021-09-01

## 2021-02-12 ASSESSMENT — LIFESTYLE VARIABLES
HAVE YOU OR SOMEONE ELSE BEEN INJURED AS A RESULT OF YOUR DRINKING: NO
AUDIT TOTAL SCORE: 0
HOW OFTEN DURING THE LAST YEAR HAVE YOU NEEDED AN ALCOHOLIC DRINK FIRST THING IN THE MORNING TO GET YOURSELF GOING AFTER A NIGHT OF HEAVY DRINKING: 0
HOW OFTEN DURING THE LAST YEAR HAVE YOU BEEN UNABLE TO REMEMBER WHAT HAPPENED THE NIGHT BEFORE BECAUSE YOU HAD BEEN DRINKING: NEVER
HOW OFTEN DURING THE LAST YEAR HAVE YOU HAD A FEELING OF GUILT OR REMORSE AFTER DRINKING: 0
HOW OFTEN DO YOU HAVE SIX OR MORE DRINKS ON ONE OCCASION: NEVER
HOW OFTEN DURING THE LAST YEAR HAVE YOU NEEDED AN ALCOHOLIC DRINK FIRST THING IN THE MORNING TO GET YOURSELF GOING AFTER A NIGHT OF HEAVY DRINKING: NEVER
HOW OFTEN DURING THE LAST YEAR HAVE YOU FOUND THAT YOU WERE NOT ABLE TO STOP DRINKING ONCE YOU HAD STARTED: NEVER
HOW OFTEN DURING THE LAST YEAR HAVE YOU FOUND THAT YOU WERE NOT ABLE TO STOP DRINKING ONCE YOU HAD STARTED: 0
HOW OFTEN DURING THE LAST YEAR HAVE YOU FAILED TO DO WHAT WAS NORMALLY EXPECTED FROM YOU BECAUSE OF DRINKING: 0
HOW MANY STANDARD DRINKS CONTAINING ALCOHOL DO YOU HAVE ON A TYPICAL DAY: ONE OR TWO
HOW OFTEN DO YOU HAVE SIX OR MORE DRINKS ON ONE OCCASION: 0
HAS A RELATIVE, FRIEND, DOCTOR, OR ANOTHER HEALTH PROFESSIONAL EXPRESSED CONCERN ABOUT YOUR DRINKING OR SUGGESTED YOU CUT DOWN: NO
AUDIT-C TOTAL SCORE: 0
HOW OFTEN DURING THE LAST YEAR HAVE YOU FAILED TO DO WHAT WAS NORMALLY EXPECTED FROM YOU BECAUSE OF DRINKING: NEVER
HOW OFTEN DURING THE LAST YEAR HAVE YOU HAD A FEELING OF GUILT OR REMORSE AFTER DRINKING: NEVER
HOW OFTEN DO YOU HAVE A DRINK CONTAINING ALCOHOL: TWO TO FOUR TIMES A MONTH
HOW OFTEN DO YOU HAVE A DRINK CONTAINING ALCOHOL: 2
HAVE YOU OR SOMEONE ELSE BEEN INJURED AS A RESULT OF YOUR DRINKING: 0
HOW MANY STANDARD DRINKS CONTAINING ALCOHOL DO YOU HAVE ON A TYPICAL DAY: 0

## 2021-02-12 ASSESSMENT — PATIENT HEALTH QUESTIONNAIRE - PHQ9
SUM OF ALL RESPONSES TO PHQ9 QUESTIONS 1 & 2: 1
2. FEELING DOWN, DEPRESSED OR HOPELESS: 1

## 2021-02-13 ENCOUNTER — HOSPITAL ENCOUNTER (OUTPATIENT)
Age: 71
Discharge: HOME | End: 2021-02-13
Payer: MEDICARE

## 2021-02-13 DIAGNOSIS — I10 ESSENTIAL HYPERTENSION: ICD-10-CM

## 2021-02-13 DIAGNOSIS — R73.9 HYPERGLYCEMIA: ICD-10-CM

## 2021-02-13 DIAGNOSIS — E03.4 HYPOTHYROIDISM DUE TO ACQUIRED ATROPHY OF THYROID: ICD-10-CM

## 2021-02-13 LAB
A/G RATIO: 1.4 (ref 1.1–2.2)
ALBUMIN SERPL-MCNC: 4 G/DL (ref 3.4–5)
ALP BLD-CCNC: 67 U/L (ref 40–129)
ALT SERPL-CCNC: 17 U/L (ref 10–40)
ANION GAP SERPL CALCULATED.3IONS-SCNC: 10 MMOL/L (ref 3–16)
AST SERPL-CCNC: 29 U/L (ref 15–37)
BILIRUB SERPL-MCNC: 0.6 MG/DL (ref 0–1)
BUN BLDV-MCNC: 14 MG/DL (ref 7–20)
CALCIUM SERPL-MCNC: 9.1 MG/DL (ref 8.3–10.6)
CHLORIDE BLD-SCNC: 102 MMOL/L (ref 99–110)
CO2: 29 MMOL/L (ref 21–32)
CREAT SERPL-MCNC: 0.8 MG/DL (ref 0.6–1.2)
GFR AFRICAN AMERICAN: >60
GFR NON-AFRICAN AMERICAN: >60
GLOBULIN: 2.9 G/DL
GLUCOSE FASTING: 118 MG/DL (ref 70–99)
POTASSIUM SERPL-SCNC: 3.8 MMOL/L (ref 3.5–5.1)
SODIUM BLD-SCNC: 141 MMOL/L (ref 136–145)
TOTAL PROTEIN: 6.9 G/DL (ref 6.4–8.2)
TSH SERPL DL<=0.05 MIU/L-ACNC: 0.52 UIU/ML (ref 0.27–4.2)

## 2021-02-13 PROCEDURE — 84443 ASSAY THYROID STIM HORMONE: CPT

## 2021-02-13 PROCEDURE — 83036 HEMOGLOBIN GLYCOSYLATED A1C: CPT

## 2021-02-13 PROCEDURE — 36415 COLL VENOUS BLD VENIPUNCTURE: CPT

## 2021-02-13 PROCEDURE — 80053 COMPREHEN METABOLIC PANEL: CPT

## 2021-02-14 LAB
ESTIMATED AVERAGE GLUCOSE: 131.2 MG/DL
HBA1C MFR BLD: 6.2 %

## 2021-02-16 ENCOUNTER — TELEMEDICINE (OUTPATIENT)
Dept: INTERNAL MEDICINE CLINIC | Age: 71
End: 2021-02-16
Payer: MEDICARE

## 2021-02-16 DIAGNOSIS — F51.04 PSYCHOPHYSIOLOGICAL INSOMNIA: ICD-10-CM

## 2021-02-16 DIAGNOSIS — Z00.00 ROUTINE GENERAL MEDICAL EXAMINATION AT A HEALTH CARE FACILITY: Primary | ICD-10-CM

## 2021-02-16 PROCEDURE — 1123F ACP DISCUSS/DSCN MKR DOCD: CPT | Performed by: INTERNAL MEDICINE

## 2021-02-16 PROCEDURE — G0447 BEHAVIOR COUNSEL OBESITY 15M: HCPCS | Performed by: INTERNAL MEDICINE

## 2021-02-16 PROCEDURE — G0438 PPPS, INITIAL VISIT: HCPCS | Performed by: INTERNAL MEDICINE

## 2021-02-16 PROCEDURE — 3017F COLORECTAL CA SCREEN DOC REV: CPT | Performed by: INTERNAL MEDICINE

## 2021-02-16 PROCEDURE — 4040F PNEUMOC VAC/ADMIN/RCVD: CPT | Performed by: INTERNAL MEDICINE

## 2021-02-16 RX ORDER — ASCORBIC ACID 500 MG
4000 TABLET ORAL DAILY
COMMUNITY

## 2021-02-16 RX ORDER — ZOLPIDEM TARTRATE 5 MG/1
5 TABLET ORAL NIGHTLY PRN
Qty: 30 TABLET | Refills: 0 | Status: SHIPPED | OUTPATIENT
Start: 2021-02-16 | End: 2021-08-24 | Stop reason: SDUPTHER

## 2021-02-16 RX ORDER — CHLORAL HYDRATE 500 MG
1000 CAPSULE ORAL DAILY
COMMUNITY

## 2021-02-16 RX ORDER — LORATADINE 10 MG/1
10 CAPSULE, LIQUID FILLED ORAL DAILY
COMMUNITY

## 2021-02-16 NOTE — PATIENT INSTRUCTIONS
Personalized Preventive Plan for [facility]c Fay - 2/16/2021  Medicare offers a range of preventive health benefits. Some of the tests and screenings are paid in full while other may be subject to a deductible, co-insurance, and/or copay. Some of these benefits include a comprehensive review of your medical history including lifestyle, illnesses that may run in your family, and various assessments and screenings as appropriate. After reviewing your medical record and screening and assessments performed today your provider may have ordered immunizations, labs, imaging, and/or referrals for you. A list of these orders (if applicable) as well as your Preventive Care list are included within your After Visit Summary for your review. Other Preventive Recommendations:    · A preventive eye exam performed by an eye specialist is recommended every 1-2 years to screen for glaucoma; cataracts, macular degeneration, and other eye disorders. · A preventive dental visit is recommended every 6 months. · Try to get at least 150 minutes of exercise per week or 10,000 steps per day on a pedometer . · Order or download the FREE \"Exercise & Physical Activity: Your Everyday Guide\" from [employer]. Call 5-841.714.6989 or search [employer] online. · You need 2376-0175 mg of calcium and 1444-0242 IU of vitamin D per day. It is possible to meet your calcium requirement with diet alone, but a vitamin D supplement is usually necessary to meet this goal.  · When exposed to the sun, use a sunscreen that protects against both UVA and UVB radiation with an SPF of 30 or greater. Reapply every 2 to 3 hours or after sweating, drying off with a towel, or swimming. · Always wear a seat belt when traveling in a car. Always wear a helmet when riding a bicycle or motorcycle.
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
## 2021-02-18 ENCOUNTER — TELEPHONE (OUTPATIENT)
Dept: SPIRITUAL SERVICES | Age: 71
End: 2021-02-18

## 2021-02-19 ENCOUNTER — CLINICAL DOCUMENTATION (OUTPATIENT)
Dept: SPIRITUAL SERVICES | Age: 71
End: 2021-02-19

## 2021-02-19 NOTE — ACP (ADVANCE CARE PLANNING)
Outpatient Spiritual Care Services (SCS) team member called the patient regarding her ACP referral.  The patient was pleasant and said she had already spoken to another ACP specialist yesterday. I apologized for the mix-up and asked if she had any questions at this time. She said no, but was grateful for checking in.

## 2021-03-11 ENCOUNTER — CLINICAL DOCUMENTATION (OUTPATIENT)
Dept: SPIRITUAL SERVICES | Age: 71
End: 2021-03-11

## 2021-03-12 NOTE — PROGRESS NOTES
Received Email from patient last week asking for Documents to be left at [facility]  for her to  as she wanted to look over a printed copy.

## 2021-04-04 ENCOUNTER — PATIENT MESSAGE (OUTPATIENT)
Dept: INTERNAL MEDICINE CLINIC | Age: 71
End: 2021-04-04

## 2021-04-04 RX ORDER — IBUPROFEN 800 MG/1
800 TABLET ORAL EVERY 6 HOURS PRN
Qty: 60 TABLET | Refills: 2 | Status: SHIPPED | OUTPATIENT
Start: 2021-04-04 | End: 2022-08-23 | Stop reason: DRUGHIGH

## 2021-04-15 ENCOUNTER — TELEPHONE (OUTPATIENT)
Dept: ORTHOPEDIC SURGERY | Age: 71
End: 2021-04-15

## 2021-04-15 NOTE — TELEPHONE ENCOUNTER
GAVE [facility] MEDICAL RECORDS -- ALL-- TO MICHELLE LOPEZ TO SCAN IN [facility] TO [address] [place] [address]

## 2021-04-26 ENCOUNTER — TELEPHONE (OUTPATIENT)
Dept: ORTHOPEDIC SURGERY | Age: 71
End: 2021-04-26

## 2021-04-26 NOTE — TELEPHONE ENCOUNTER
GAVE [facility]LANA [facility] MEDICAL RECORDS 12/31/2010 TO PRESENT TO MICHELLE LOPEZ TO SCAN IN [facility] TO ARYAN BRANDON & [facility]    E-MAILED [facility] PB  REQUEST FOR ITEMIZED BILLING    SENT CD LETTER TO ARYAN MARCOS [id number] [address] [place] [address] [address] AT [facility]

## 2021-04-30 ENCOUNTER — E-VISIT (OUTPATIENT)
Dept: INTERNAL MEDICINE CLINIC | Age: 71
End: 2021-04-30
Payer: MEDICARE

## 2021-04-30 DIAGNOSIS — J01.90 ACUTE NON-RECURRENT SINUSITIS, UNSPECIFIED LOCATION: Primary | ICD-10-CM

## 2021-04-30 PROCEDURE — 99421 OL DIG E/M SVC 5-10 MIN: CPT | Performed by: INTERNAL MEDICINE

## 2021-04-30 RX ORDER — FLUCONAZOLE 150 MG/1
150 TABLET ORAL ONCE
Qty: 2 TABLET | Refills: 0 | Status: SHIPPED | OUTPATIENT
Start: 2021-04-30 | End: 2021-09-27 | Stop reason: SDUPTHER

## 2021-04-30 RX ORDER — AMOXICILLIN AND CLAVULANATE POTASSIUM 875; 125 MG/1; MG/1
1 TABLET, FILM COATED ORAL 2 TIMES DAILY
Qty: 20 TABLET | Refills: 0 | Status: SHIPPED | OUTPATIENT
Start: 2021-04-30 | End: 2021-09-27 | Stop reason: SDUPTHER

## 2021-04-30 ASSESSMENT — LIFESTYLE VARIABLES
PACKS_PER_DAY: 0
SMOKING_YEARS: 3
SMOKING_STATUS: NO, I'M A FORMER SMOKER

## 2021-04-30 NOTE — PROGRESS NOTES
HPI: as per patient provided history  Exam: N/A (electronic visit)  ASSESSMENT/PLAN:  1. Acute non-recurrent sinusitis, unspecified location  Supportive care guidelines provided. - amoxicillin-clavulanate (AUGMENTIN) 875-125 MG per tablet; Take 1 tablet by mouth 2 times daily for 10 days Take with meals. Dispense: 20 tablet; Refill: 0  - fluconazole (DIFLUCAN) 150 MG tablet; Take 1 tablet by mouth once for 1 dose May repeat in 5 days, if symptoms persist or recur. Dispense: 2 tablet; Refill: 0  - Patient was advised to take probiotic, such as DanActiv yogurt drink, Florastor or Culturelle, twice daily while on antibiotics and for 1 week after. Patient instructed to call the office if worsens, or fails to improve as anticipated. 5-10 minutes were spent on the digital evaluation and management of this patient.

## 2021-05-10 RX ORDER — HYDROCHLOROTHIAZIDE 25 MG/1
TABLET ORAL
Qty: 90 TABLET | Refills: 3 | Status: SHIPPED | OUTPATIENT
Start: 2021-05-10 | End: 2022-05-05
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
## 2021-06-17 ENCOUNTER — OFFICE VISIT (OUTPATIENT)
Dept: INTERNAL MEDICINE CLINIC | Age: 71
End: 2021-06-17
Payer: MEDICARE

## 2021-06-17 ENCOUNTER — HOSPITAL ENCOUNTER (OUTPATIENT)
Dept: MRI IMAGING | Age: 71
Discharge: HOME | End: 2021-06-17
Payer: MEDICARE

## 2021-06-17 VITALS
WEIGHT: 196 LBS | HEIGHT: 67 IN | DIASTOLIC BLOOD PRESSURE: 72 MMHG | HEART RATE: 73 BPM | SYSTOLIC BLOOD PRESSURE: 112 MMHG | OXYGEN SATURATION: 98 % | BODY MASS INDEX: 30.76 KG/M2 | RESPIRATION RATE: 16 BRPM

## 2021-06-17 DIAGNOSIS — E03.4 HYPOTHYROIDISM DUE TO ACQUIRED ATROPHY OF THYROID: ICD-10-CM

## 2021-06-17 DIAGNOSIS — M54.12 RADICULOPATHY OF CERVICAL SPINE: ICD-10-CM

## 2021-06-17 DIAGNOSIS — R73.9 HYPERGLYCEMIA: ICD-10-CM

## 2021-06-17 DIAGNOSIS — M48.062 SPINAL STENOSIS OF LUMBAR REGION WITH NEUROGENIC CLAUDICATION: Primary | ICD-10-CM

## 2021-06-17 DIAGNOSIS — E78.00 PURE HYPERCHOLESTEROLEMIA: Primary | ICD-10-CM

## 2021-06-17 PROBLEM — M50.30 DDD (DEGENERATIVE DISC DISEASE), CERVICAL: Status: ACTIVE | Noted: 2021-06-17

## 2021-06-17 PROBLEM — E04.1 THYROID NODULE: Status: ACTIVE | Noted: 2021-06-17

## 2021-06-17 PROCEDURE — 1036F TOBACCO NON-USER: CPT | Performed by: INTERNAL MEDICINE

## 2021-06-17 PROCEDURE — 3017F COLORECTAL CA SCREEN DOC REV: CPT | Performed by: INTERNAL MEDICINE

## 2021-06-17 PROCEDURE — G8417 CALC BMI ABV UP PARAM F/U: HCPCS | Performed by: INTERNAL MEDICINE

## 2021-06-17 PROCEDURE — 99214 OFFICE O/P EST MOD 30 MIN: CPT | Performed by: INTERNAL MEDICINE

## 2021-06-17 PROCEDURE — G8427 DOCREV CUR MEDS BY ELIG CLIN: HCPCS | Performed by: INTERNAL MEDICINE

## 2021-06-17 PROCEDURE — G8399 PT W/DXA RESULTS DOCUMENT: HCPCS | Performed by: INTERNAL MEDICINE

## 2021-06-17 PROCEDURE — 1090F PRES/ABSN URINE INCON ASSESS: CPT | Performed by: INTERNAL MEDICINE

## 2021-06-17 PROCEDURE — 72141 MRI NECK SPINE W/O DYE: CPT

## 2021-06-17 PROCEDURE — 1123F ACP DISCUSS/DSCN MKR DOCD: CPT | Performed by: INTERNAL MEDICINE

## 2021-06-17 PROCEDURE — 4040F PNEUMOC VAC/ADMIN/RCVD: CPT | Performed by: INTERNAL MEDICINE

## 2021-06-17 RX ORDER — GABAPENTIN 300 MG/1
CAPSULE ORAL
COMMUNITY
Start: 2021-06-12

## 2021-06-17 SDOH — ECONOMIC STABILITY: FOOD INSECURITY: WITHIN THE PAST 12 MONTHS, YOU WORRIED THAT YOUR FOOD WOULD RUN OUT BEFORE YOU GOT MONEY TO BUY MORE.: NEVER TRUE

## 2021-06-17 SDOH — ECONOMIC STABILITY: TRANSPORTATION INSECURITY
IN THE PAST 12 MONTHS, HAS LACK OF TRANSPORTATION KEPT YOU FROM MEETINGS, WORK, OR FROM GETTING THINGS NEEDED FOR DAILY LIVING?: NO

## 2021-06-17 SDOH — ECONOMIC STABILITY: TRANSPORTATION INSECURITY
IN THE PAST 12 MONTHS, HAS THE LACK OF TRANSPORTATION KEPT YOU FROM MEDICAL APPOINTMENTS OR FROM GETTING MEDICATIONS?: NO

## 2021-06-17 SDOH — ECONOMIC STABILITY: FOOD INSECURITY: WITHIN THE PAST 12 MONTHS, THE FOOD YOU BOUGHT JUST DIDN'T LAST AND YOU DIDN'T HAVE MONEY TO GET MORE.: NEVER TRUE

## 2021-06-17 SDOH — ECONOMIC STABILITY: HOUSING INSECURITY
IN THE LAST 12 MONTHS, WAS THERE A TIME WHEN YOU DID NOT HAVE A STEADY PLACE TO SLEEP OR SLEPT IN A SHELTER (INCLUDING NOW)?: NO

## 2021-06-17 SDOH — ECONOMIC STABILITY: INCOME INSECURITY: IN THE LAST 12 MONTHS, WAS THERE A TIME WHEN YOU WERE NOT ABLE TO PAY THE MORTGAGE OR RENT ON TIME?: NO

## 2021-06-17 ASSESSMENT — SOCIAL DETERMINANTS OF HEALTH (SDOH): HOW HARD IS IT FOR YOU TO PAY FOR THE VERY BASICS LIKE FOOD, HOUSING, MEDICAL CARE, AND HEATING?: NOT HARD AT ALL

## 2021-07-07 ENCOUNTER — TELEPHONE (OUTPATIENT)
Dept: ORTHOPEDIC SURGERY | Age: 71
End: 2021-07-07

## 2021-07-07 NOTE — TELEPHONE ENCOUNTER
GAVE ALANA / Juliet Uribe CERTIFIED MEDICAL RECORDS 12/31/2010 TO PRESENT TO MICHELLE LOPEZ TO SCAN IN [facility] TO ARYAN BRANDON & [address] [facility]     E-MAILED [facility] PB  REQUEST FOR BILLING STATEMENTS    FAXED A CD CHARGE LETTER FOR [employer]

## 2021-08-23 ENCOUNTER — HOSPITAL ENCOUNTER (OUTPATIENT)
Age: 71
Discharge: HOME | End: 2021-08-23
Payer: MEDICARE

## 2021-08-23 DIAGNOSIS — R73.9 HYPERGLYCEMIA: ICD-10-CM

## 2021-08-23 DIAGNOSIS — E03.4 HYPOTHYROIDISM DUE TO ACQUIRED ATROPHY OF THYROID: ICD-10-CM

## 2021-08-23 DIAGNOSIS — E78.00 PURE HYPERCHOLESTEROLEMIA: ICD-10-CM

## 2021-08-23 LAB
A/G RATIO: 1.5 (ref 1.1–2.2)
ALBUMIN SERPL-MCNC: 4.1 G/DL (ref 3.4–5)
ALP BLD-CCNC: 79 U/L (ref 40–129)
ALT SERPL-CCNC: 14 U/L (ref 10–40)
ANION GAP SERPL CALCULATED.3IONS-SCNC: 8 MMOL/L (ref 3–16)
AST SERPL-CCNC: 23 U/L (ref 15–37)
BILIRUB SERPL-MCNC: 0.8 MG/DL (ref 0–1)
BUN BLDV-MCNC: 16 MG/DL (ref 7–20)
CALCIUM SERPL-MCNC: 9.3 MG/DL (ref 8.3–10.6)
CHLORIDE BLD-SCNC: 104 MMOL/L (ref 99–110)
CHOLESTEROL, FASTING: 187 MG/DL (ref 0–199)
CO2: 30 MMOL/L (ref 21–32)
CREAT SERPL-MCNC: 0.9 MG/DL (ref 0.6–1.2)
ESTIMATED AVERAGE GLUCOSE: 125.5 MG/DL
GFR AFRICAN AMERICAN: >60
GFR NON-AFRICAN AMERICAN: >60
GLOBULIN: 2.8 G/DL
GLUCOSE FASTING: 113 MG/DL (ref 70–99)
HBA1C MFR BLD: 6 %
HDLC SERPL-MCNC: 44 MG/DL (ref 40–60)
LDL CHOLESTEROL CALCULATED: 124 MG/DL
POTASSIUM SERPL-SCNC: 4.3 MMOL/L (ref 3.5–5.1)
SODIUM BLD-SCNC: 142 MMOL/L (ref 136–145)
TOTAL PROTEIN: 6.9 G/DL (ref 6.4–8.2)
TRIGLYCERIDE, FASTING: 97 MG/DL (ref 0–150)
TSH SERPL DL<=0.05 MIU/L-ACNC: 1.13 UIU/ML (ref 0.27–4.2)
VLDLC SERPL CALC-MCNC: 19 MG/DL

## 2021-08-23 PROCEDURE — 84443 ASSAY THYROID STIM HORMONE: CPT

## 2021-08-23 PROCEDURE — 36415 COLL VENOUS BLD VENIPUNCTURE: CPT

## 2021-08-23 PROCEDURE — 80053 COMPREHEN METABOLIC PANEL: CPT

## 2021-08-23 PROCEDURE — 80061 LIPID PANEL: CPT

## 2021-08-23 PROCEDURE — 83036 HEMOGLOBIN GLYCOSYLATED A1C: CPT

## 2021-08-23 NOTE — PROGRESS NOTES
Date of Visit:  2021    CC: Shellie Bae (: 1950) is a 79 y.o. female, established patient, here for evaluation/re-evaluation of the following medical concerns:    ASSESSMENT/PLAN:  Essential hypertension  Assessment & Plan:  Well-controlled. Continue current antihypertensive regimen. Pure hypercholesterolemia  Assessment & Plan:  LDL higher than prior reading, but still at goal of < 130. Continue current dose of simvastatin. Hyperglycemia  Assessment & Plan:  Improved. Importance of continued lifestyle changes stressed to help prevent progression to diabetes. Hypothyroidism due to acquired atrophy of thyroid  Assessment & Plan:  Clinically and biochemically euthyroid. Continue current dose of Synthroid. Thyroid nodule  Assessment & Plan:  Asymptomatic. Thyroid US ordered to further evaluate thyroid nodule recently seen on MRI of cervical spine. Orders:  -     US THYROID; Future  Anxiety  Assessment & Plan:  Slow improvement. Adequately controlled on 15 mg dose of Lexapro- continue. She will restart therapy if symptoms worsen. Post traumatic stress disorder  Assessment & Plan:  See plan for anxiety. Psychophysiological insomnia  Assessment & Plan:  Adequately controlled on intermittent, low dose Ambien- continue lowest possible usage. Principles of good sleep hygiene discussed. Orders:  -     zolpidem (AMBIEN) 5 MG tablet; Take 1 tablet by mouth nightly as needed for Sleep for up to 30 days. , Disp-30 tablet, R-0Normal    Return in about 6 months (around 2022) for MEDICARE AW.      Patient-Reported Vitals 2021   Patient-Reported Weight 195lb   Patient-Reported Height 5'7\"   Patient-Reported Systolic 055   Patient-Reported Diastolic 80   Patient-Reported Pulse 81        Wt Readings from Last 3 Encounters:   21 196 lb (88.9 kg)   20 191 lb (86.6 kg)   20 194 lb (88 kg)     BP Readings from Last 3 Encounters:   21 112/72   20 110/76 20 124/72     Estimated body mass index is 30.7 kg/m² as calculated from the following:    Height as of 21: 5' 7\" (1.702 m). Weight as of 21: 196 lb (88.9 kg). HPI  Hypertension:  Home blood pressure monitorins/low 80s. She is adherent to a low sodium diet. Patient denies chest pain, shortness of breath,  lightheadedness and palpitations.  Antihypertensive medication side effects: no medication side effects noted.  Use of agents associated with hypertension: Advil 400-600 mg- only takes occasionally now.  Renal function, electrolytes and LFTs nl- 21.     Hyperlipidemia/hyperglycemia:  No new myalgias or GI upset on simvastatin (Zocor). Medication compliance: compliant all of the time. Patient is following a low fat, low cholesterol diet, and has cut back on simple carbs overall.  She is exercising regularly- treadmill or exercise bicycle daily for 30 minutes.  No polyuria and polydipsia. , HgbA1c 6.0,  with normal triglycerides and HDL-  21.     Hypothyroidism: Recent symptoms: none. She denies fatigue, weight changes, cold intolerance, heat intolerance, hair loss, dry skin, constipation, diarrhea and palpitations. Patient is  taking her medication consistently on an empty stomach. TSH normal 21. Anxiety/PTSD:  Still having anxiety with driving, but slowly improving. No longer seeing psychologist.  Doing well on 15 mg dose of Lexapro- taking consistently. No side effects. Taking Ambien 1-2x/week- working well, no side effects. ROS  As documented above    Physical Exam  Constitutional:       General: She is not in acute distress. Appearance: She is well-developed. HENT:      Head: Normocephalic and atraumatic. Mouth/Throat:      Lips: No lesions. Neck:      Comments: No visible mass  Pulmonary:      Effort: Pulmonary effort is normal. No respiratory distress.    Skin:     Comments: No rash, erythema or other discoloration on facial skin and exposed areas of neck and upper extremites    Neurological:      Mental Status: She is alert. Comments: No facial asymmetry (cranial nerve 7 motor function) or gaze palsy   Psychiatric:         Attention and Perception: Attention normal.         Mood and Affect: Mood normal.         Speech: Speech normal.         Behavior: Behavior normal.         Thought Content: Thought content normal.         Cognition and Memory: Cognition normal.           Jez Garcia is a 79 y.o. female being evaluated by a Virtual Visit (video visit) encounter to address concerns as mentioned above. A caregiver was present when appropriate. Due to this being a TeleHealth encounter (During DXIK-65 public health emergency), evaluation of the following organ systems was limited: Vitals/Constitutional/EENT/Resp/CV/GI//MS/Neuro/Skin/Heme-Lymph-Imm. Pursuant to the emergency declaration under the [address] [place] [address] authority and the [employer] and [employer] Act, this Virtual Visit was conducted with patient's (and/or legal guardian's) consent, to reduce the patient's risk of exposure to COVID-19 and provide necessary medical care. The patient (and/or legal guardian) has also been advised to contact this office for worsening conditions or problems, and seek emergency medical treatment and/or call 911 if deemed necessary. Patient identification was verified at the start of the visit: Yes    Services were provided through a video synchronous discussion virtually to substitute for in-person clinic visit. Patient and provider were located at their individual homes. --Dominick Thompson MD on 8/24/2021 at 12:17 PM    An electronic signature was used to authenticate this note.

## 2021-08-24 ENCOUNTER — VIRTUAL VISIT (OUTPATIENT)
Dept: INTERNAL MEDICINE CLINIC | Age: 71
End: 2021-08-24
Payer: MEDICARE

## 2021-08-24 DIAGNOSIS — F41.9 ANXIETY: ICD-10-CM

## 2021-08-24 DIAGNOSIS — E03.4 HYPOTHYROIDISM DUE TO ACQUIRED ATROPHY OF THYROID: ICD-10-CM

## 2021-08-24 DIAGNOSIS — E78.00 PURE HYPERCHOLESTEROLEMIA: ICD-10-CM

## 2021-08-24 DIAGNOSIS — R73.9 HYPERGLYCEMIA: ICD-10-CM

## 2021-08-24 DIAGNOSIS — F51.04 PSYCHOPHYSIOLOGICAL INSOMNIA: ICD-10-CM

## 2021-08-24 DIAGNOSIS — I10 ESSENTIAL HYPERTENSION: Primary | ICD-10-CM

## 2021-08-24 DIAGNOSIS — F43.10 POST TRAUMATIC STRESS DISORDER: ICD-10-CM

## 2021-08-24 DIAGNOSIS — E04.1 THYROID NODULE: ICD-10-CM

## 2021-08-24 PROCEDURE — 1123F ACP DISCUSS/DSCN MKR DOCD: CPT | Performed by: INTERNAL MEDICINE

## 2021-08-24 PROCEDURE — 99214 OFFICE O/P EST MOD 30 MIN: CPT | Performed by: INTERNAL MEDICINE

## 2021-08-24 PROCEDURE — 4040F PNEUMOC VAC/ADMIN/RCVD: CPT | Performed by: INTERNAL MEDICINE

## 2021-08-24 PROCEDURE — G8427 DOCREV CUR MEDS BY ELIG CLIN: HCPCS | Performed by: INTERNAL MEDICINE

## 2021-08-24 PROCEDURE — G8399 PT W/DXA RESULTS DOCUMENT: HCPCS | Performed by: INTERNAL MEDICINE

## 2021-08-24 PROCEDURE — 1090F PRES/ABSN URINE INCON ASSESS: CPT | Performed by: INTERNAL MEDICINE

## 2021-08-24 PROCEDURE — 3017F COLORECTAL CA SCREEN DOC REV: CPT | Performed by: INTERNAL MEDICINE

## 2021-08-24 RX ORDER — ZOLPIDEM TARTRATE 5 MG/1
5 TABLET ORAL NIGHTLY PRN
Qty: 30 TABLET | Refills: 0 | Status: SHIPPED | OUTPATIENT
Start: 2021-08-24 | End: 2021-09-23

## 2021-08-24 NOTE — ASSESSMENT & PLAN NOTE
Slow improvement. Adequately controlled on 15 mg dose of Lexapro- continue. She will restart therapy if symptoms worsen.

## 2021-08-24 NOTE — ASSESSMENT & PLAN NOTE
Improved. Importance of continued lifestyle changes stressed to help prevent progression to diabetes.

## 2021-08-24 NOTE — ASSESSMENT & PLAN NOTE
Adequately controlled on intermittent, low dose Ambien- continue lowest possible usage. Principles of good sleep hygiene discussed.

## 2021-08-24 NOTE — ASSESSMENT & PLAN NOTE
Asymptomatic. Thyroid US ordered to further evaluate thyroid nodule recently seen on MRI of cervical spine.

## 2021-08-27 ENCOUNTER — HOSPITAL ENCOUNTER (OUTPATIENT)
Dept: ULTRASOUND IMAGING | Age: 71
Discharge: HOME | End: 2021-08-27
Payer: MEDICARE

## 2021-08-27 DIAGNOSIS — E04.1 THYROID NODULE: ICD-10-CM

## 2021-08-27 PROCEDURE — 76536 US EXAM OF HEAD AND NECK: CPT

## 2021-08-30 ENCOUNTER — TELEPHONE (OUTPATIENT)
Dept: ORTHOPEDIC SURGERY | Age: 71
End: 2021-08-30

## 2021-08-31 ENCOUNTER — E-VISIT (OUTPATIENT)
Dept: INTERNAL MEDICINE CLINIC | Age: 71
End: 2021-08-31
Payer: MEDICARE

## 2021-08-31 DIAGNOSIS — R19.7 DIARRHEA, UNSPECIFIED TYPE: Primary | ICD-10-CM

## 2021-08-31 PROCEDURE — 99421 OL DIG E/M SVC 5-10 MIN: CPT | Performed by: INTERNAL MEDICINE

## 2021-08-31 NOTE — PROGRESS NOTES
HPI: as per patient provided history  Exam: N/A (electronic visit)  ASSESSMENT/PLAN:  1. Diarrhea, unspecified type  Likely viral or due to food-borne illness- no alarm symptoms. Supportive care guidelines provided. If persists > 1 week, will obtain stool studies    Patient instructed to call the office if worsens, or fails to improve as anticipated. 5-10 minutes were spent on the digital evaluation and management of this patient.

## 2021-09-01 RX ORDER — LOSARTAN POTASSIUM 50 MG/1
50 TABLET ORAL DAILY
Qty: 90 TABLET | Refills: 1 | Status: SHIPPED | OUTPATIENT
Start: 2021-09-01 | End: 2022-02-13

## 2021-09-27 ENCOUNTER — E-VISIT (OUTPATIENT)
Dept: INTERNAL MEDICINE CLINIC | Age: 71
End: 2021-09-27
Payer: MEDICARE

## 2021-09-27 DIAGNOSIS — J01.90 ACUTE NON-RECURRENT SINUSITIS, UNSPECIFIED LOCATION: ICD-10-CM

## 2021-09-27 PROCEDURE — 99421 OL DIG E/M SVC 5-10 MIN: CPT | Performed by: INTERNAL MEDICINE

## 2021-09-27 RX ORDER — FLUCONAZOLE 150 MG/1
150 TABLET ORAL ONCE
Qty: 2 TABLET | Refills: 0 | Status: SHIPPED | OUTPATIENT
Start: 2021-09-27 | End: 2022-03-02

## 2021-09-27 RX ORDER — AMOXICILLIN AND CLAVULANATE POTASSIUM 875; 125 MG/1; MG/1
1 TABLET, FILM COATED ORAL 2 TIMES DAILY
Qty: 20 TABLET | Refills: 0 | Status: SHIPPED | OUTPATIENT
Start: 2021-09-27 | End: 2022-06-25 | Stop reason: SDUPTHER

## 2021-09-27 ASSESSMENT — LIFESTYLE VARIABLES: SMOKING_STATUS: NO, I'VE NEVER SMOKED

## 2021-09-27 NOTE — PROGRESS NOTES
HPI: as per patient provided history  Exam: N/A (electronic visit)  ASSESSMENT/PLAN:  1. Acute non-recurrent sinusitis, unspecified location  Likely post-viral. Supportive care guidelines provided. - amoxicillin-clavulanate (AUGMENTIN) 875-125 MG per tablet; Take 1 tablet by mouth 2 times daily for 10 days Take with meals. Dispense: 20 tablet; Refill: 0  - fluconazole (DIFLUCAN) 150 MG tablet; Take 1 tablet by mouth once for 1 dose May repeat in 5 days, if symptoms persist or recur. Dispense: 2 tablet; Refill: 0      Patient instructed to call the office if worsens, or fails to improve as anticipated. 5-10 minutes were spent on the digital evaluation and management of this patient.

## 2021-10-08 ENCOUNTER — HOSPITAL ENCOUNTER (OUTPATIENT)
Dept: PHYSICAL THERAPY | Age: 71
Setting detail: THERAPIES SERIES
Discharge: HOME | End: 2021-10-08
Payer: MEDICARE

## 2021-10-08 PROCEDURE — 97161 PT EVAL LOW COMPLEX 20 MIN: CPT

## 2021-10-08 PROCEDURE — 97530 THERAPEUTIC ACTIVITIES: CPT

## 2021-10-08 PROCEDURE — 97140 MANUAL THERAPY 1/> REGIONS: CPT
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
## 2021-10-08 NOTE — FLOWSHEET NOTE
[address] [facility] Physical Therapy  Phone: [phone]   Fax: [phone]    Physical Therapy Daily Treatment Note  Date:  10/8/2021    Patient Name:  Arlen Hernández    :  1950  MRN: [id number]  Medical/Treatment Diagnosis Information:  · Diagnosis: M47.812 (ICD-10-CM) - Spondylosis without myelopathy or radiculopathy, cervical region  · Treatment Diagnosis: Chronic neck pain, limited cervical ROM, decreased postural dysfunction  Insurance/Certification information:  PT Insurance Information: Medicare  Physician Information:  Referring Practitioner: JOÃO Fortune  Plan of care signed (Y/N): []  Yes [x]  No     Date of Patient follow up with Physician:      Progress Report: []  Yes  [x]  No     Date Range for reporting period:  Beginning: 10/8/21  Ending:     Progress report due (10 Rx/or 30 days whichever is less): visit #60 or 84/6    Recertification due (POC duration/ or 90 days whichever is less): visit #    Visit # Insurance Allowable Auth required?  Date Range   -8 [place] []  Yes  [x]  No          Latex Allergy:  [x]NO      []YES  Preferred Language for Healthcare:   [x]English       []other:    Functional Scale:        Date assessed:  NDI: raw score = 34, 68% dysfunction   10/8      Pain level:  6/10     SUBJECTIVE:  See eval    OBJECTIVE: See eval      RESTRICTIONS/PRECAUTIONS: history of C2 fracture     Exercises/Interventions:     Therapeutic Exercises (74430) Resistance / level Sets/sec Reps Notes   UBE              Cervical AROM       Mid Rows       High Rows       LPD              Chin Tucks               Prone [facility] [place]                     Therapeutic Activities (73265)                                          Neuromuscular Re-ed (45930)                                                 Manual Intervention (05826)       Gentle cervical PROM, UT/levator stretch, trigger point release, SOR, gentle distraction X 10 mins Modalities:     Pt. Education:  -patient educated on diagnosis, prognosis and expectations for rehab  -all patient questions were answered    Home Exercise Program:    Access Code: [email or web address]  URL: [email or web address]  Date: 10/08/2021  Prepared by: Nikos Savage    Exercises  Seated Scapular Retraction - 2 x daily - 7 x weekly - 2-3 sets - 10 reps  Seated Assisted Cervical Rotation with Towel - 2 x daily - 7 x weekly - 2-3 sets - 10 reps  Standing Cervical Sidebending AROM - 2 x daily - 7 x weekly - 2-3 sets - 10 reps  Standing Cervical Flexion AROM - 2 x daily - 7 x weekly - 2-3 sets - 10 reps    Therapeutic Exercise and NMR EXR  [] (72648) Provided verbal/tactile cueing for activities related to strengthening, flexibility, endurance, ROM for improvements in  [] LE / Lumbar: LE, proximal hip, and core control with self care, mobility, lifting, ambulation. [] UE / Cervical: cervical, postural, scapular, scapulothoracic and UE control with self care, reaching, carrying, lifting, house/yardwork, driving, computer work.  [] (78827) Provided verbal/tactile cueing for activities related to improving balance, coordination, kinesthetic sense, posture, motor skill, proprioception to assist with   [] LE / lumbar: LE, proximal hip, and core control in self care, mobility, lifting, ambulation and eccentric single leg control. [] UE / cervical: cervical, scapular, scapulothoracic and UE control with self care, reaching, carrying, lifting, house/yardwork, driving, computer work.   [] (85570) Therapist is in constant attendance of 2 or more patients providing skilled therapy interventions, but not providing any significant amount of measurable one-on-one time to either patient, for improvements in  [] LE / lumbar: LE, proximal hip, and core control in self care, mobility, lifting, ambulation and eccentric single leg control.    [] UE / cervical: cervical, scapular, scapulothoracic and UE control with UE control with self care, reaching, carrying, lifting, house/yardwork, driving, computer work    Manual Treatments:  PROM / STM / Oscillations-Mobs:  G-I, II, III, IV (PA's, Inf., Post.)  [] (65710) Provided manual therapy to mobilize LE, proximal hip and/or LS spine soft tissue/joints for the purpose of modulating pain, promoting relaxation,  increasing ROM, reducing/eliminating soft tissue swelling/inflammation/restriction, improving soft tissue extensibility and allowing for proper ROM for normal function with   [] LE / lumbar: self care, mobility, lifting and ambulation. [] UE / Cervical: self care, reaching, carrying, lifting, house/yardwork, driving, computer work. Modalities:  [] (83474) Vasopneumatic compression: Utilized vasopneumatic compression to decrease edema / swelling for the purpose of improving mobility and quad tone / recruitment which will allow for increased overall function including but not limited to self-care, transfers, ambulation, and ascending / descending stairs. Charges:  Timed Code Treatment Minutes: 15   Total Treatment Minutes: 44     [x] EVAL - LOW (89091)   [] EVAL - MOD (31198)  [] EVAL - HIGH (27245)  [] RE-EVAL (85161)  [] TANMAY(25926) x       [] Ionto  [] NMR (21823) x       [] Vaso  [x] Manual (42427) x  1      [] Ultrasound  [x] TA x  1     [] Mech Traction (69320)  [] Aquatic Therapy x     [] ES (un) (70406):   [] Home Management Training x  [] ES(attended) (44027)   [] Dry Needling 1-2 muscles (61223):  [] Dry Needling 3+ muscles (693308)  [] Group:      [] Other:     GOALS:     Patient stated goal: relax nerves/muscles in neck. []? Progressing: []? Met: []? Not Met: []? Adjusted      Therapist goals for Patient: relax nerves/muscles in neck  Short Term Goals: To be achieved in: 2 weeks  1. Independent in HEP and progression per patient tolerance, in order to prevent re-injury. []? Progressing: []? Met: []? Not Met: []? Adjusted  2.  Patient will have a decrease in pain to facilitate improvement in movement, function, and ADLs as indicated by Functional Deficits. []? Progressing: []? Met: []? Not Met: []? Adjusted     Long Term Goals: To be achieved in: 4 weeks or discharge   1. Disability index score of 40% or less for the NDI to assist with reaching prior level of function. []? Progressing: []? Met: []? Not Met: []? Adjusted  2. Patient will demonstrate increased AROM cervical rotation by 15* of cervical/thoracic spine to allow for proper joint functioning as indicated by patients Functional Deficits. []? Progressing: []? Met: []? Not Met: []? Adjusted  3. Patient will demonstrate an increase in postural awareness and control and activation of  Deep cervical stabilizers to allow for proper functional mobility as indicated by patients Functional Deficits. []? Progressing: []? Met: []? Not Met: []? Adjusted  4. Patient will return to functional activities including turning head for improved driving without increased symptoms or restriction. []? Progressing: []? Met: []? Not Met: []? Adjusted       Overall Progression Towards Functional goals/ Treatment Progress Update:  [] Patient is progressing as expected towards functional goals listed. [] Progression is slowed due to complexities/Impairments listed. [] Progression has been slowed due to co-morbidities.   [x] Plan just implemented, too soon to assess goals progression <30days   [] Goals require adjustment due to lack of progress  [] Patient is not progressing as expected and requires additional follow up with physician  [] Other    Persisting Functional Limitations/Impairments:  [x]Sleeping [x]Sitting               []Standing [x]Transfers        [x]Walking []Kneeling               []Stairs []Squatting / bending   [x]ADLs []Reaching  []Lifting  [x]Housework  [x]Driving [x]Job related tasks  []Sports/Recreation []Other:        ASSESSMENT:  See eval  Treatment/Activity Tolerance:  [x] Patient able to complete tx [] Patient limited by fatigue  [] Patient limited by pain  [] Patient limited by other medical complications  [] Other:     Prognosis: [x] Good [] Fair  [] Poor    Patient Requires Follow-up: [x] Yes  [] No    Plan for next treatment session:    PLAN: See eval. PT 1-2x / week for 4 weeks. [] Continue per plan of care [] Alter current plan (see comments)  [x] Plan of care initiated [] Hold pending MD visit [] Discharge    Electronically signed by: Levy Felton PT PT    Note: If patient does not return for scheduled/ recommended follow up visits, this note will serve as a discharge from care along with most recent update on progress.

## 2021-10-18 ENCOUNTER — HOSPITAL ENCOUNTER (OUTPATIENT)
Dept: PHYSICAL THERAPY | Age: 71
Setting detail: THERAPIES SERIES
Discharge: HOME | End: 2021-10-18
Payer: MEDICARE

## 2021-10-18 PROCEDURE — 97140 MANUAL THERAPY 1/> REGIONS: CPT

## 2021-10-18 PROCEDURE — 97110 THERAPEUTIC EXERCISES: CPT

## 2021-10-18 NOTE — FLOWSHEET NOTE
[address] [facility] [place] Physical Therapy  Phone: [phone]   Fax: [phone]    Physical Therapy Daily Treatment Note  Date:  10/18/2021    Patient Name:  Sidney Oliva    :  1950  MRN: [id number]  Medical/Treatment Diagnosis Information:  · Diagnosis: M47.812 (ICD-10-CM) - Spondylosis without myelopathy or radiculopathy, cervical region  · Treatment Diagnosis: Chronic neck pain, limited cervical ROM, decreased postural dysfunction  Insurance/Certification information:  PT Insurance Information: Medicare  Physician Information:  Referring Practitioner: JOÃO Alston  Plan of care signed (Y/N): []  Yes [x]  No     Date of Patient follow up with Physician:      Progress Report: []  Yes  [x]  No     Date Range for reporting period:  Beginning: 10/8/21  Ending:     Progress report due (10 Rx/or 30 days whichever is less): visit #54 or 81/4    Recertification due (POC duration/ or 90 days whichever is less): visit #    Visit # Insurance Allowable Auth required?  Date Range   -8 [place] []  Yes  [x]  No          Latex Allergy:  [x]NO      []YES  Preferred Language for Healthcare:   [x]English       []other:    Functional Scale:        Date assessed:  NDI: raw score = 34, 68% dysfunction   10/8      Pain level:  610     SUBJECTIVE:  Nothing new overall     OBJECTIVE: See eval      RESTRICTIONS/PRECAUTIONS: history of C2 fracture     Exercises/Interventions:     Therapeutic Exercises (16220) Resistance / level Sets/sec Reps Notes   UBE X 3 mins fwd/bwd             Cervical AROM  10\" X 10 ea    UT stretch        Mid Rows Lime 2 10    High Rows Lime 2 10    LPD Lime 2 10           Chin Tucks on wall with ball   X 15    Chin Tucks with rotation   X 15 B    Prone [facility] [place]                     Therapeutic Activities (00032)                                          Neuromuscular Re-ed (73713)                                                 Manual Intervention (25171) Gentle cervical PROM, UT/levator stretch, trigger point release, SOR, gentle distraction X 17 mins                                              Modalities:     Pt. Education:  -patient educated on diagnosis, prognosis and expectations for rehab  -all patient questions were answered    Home Exercise Program:    Access Code: [email or web address]  URL: [email or web address]  Date: 10/08/2021  Prepared by: Melo Fontenot    Exercises  Seated Scapular Retraction - 2 x daily - 7 x weekly - 2-3 sets - 10 reps  Seated Assisted Cervical Rotation with Towel - 2 x daily - 7 x weekly - 2-3 sets - 10 reps  Standing Cervical Sidebending AROM - 2 x daily - 7 x weekly - 2-3 sets - 10 reps  Standing Cervical Flexion AROM - 2 x daily - 7 x weekly - 2-3 sets - 10 reps    Therapeutic Exercise and NMR EXR  [] (06674) Provided verbal/tactile cueing for activities related to strengthening, flexibility, endurance, ROM for improvements in  [] LE / Lumbar: LE, proximal hip, and core control with self care, mobility, lifting, ambulation. [] UE / Cervical: cervical, postural, scapular, scapulothoracic and UE control with self care, reaching, carrying, lifting, house/yardwork, driving, computer work.  [] (12738) Provided verbal/tactile cueing for activities related to improving balance, coordination, kinesthetic sense, posture, motor skill, proprioception to assist with   [] LE / lumbar: LE, proximal hip, and core control in self care, mobility, lifting, ambulation and eccentric single leg control.    [] UE / cervical: cervical, scapular, scapulothoracic and UE control with self care, reaching, carrying, lifting, house/yardwork, driving, computer work.   [] (65170) Therapist is in constant attendance of 2 or more patients providing skilled therapy interventions, but not providing any significant amount of measurable one-on-one time to either patient, for improvements in  [] LE / lumbar: LE, proximal hip, and core control in self care, mobility, lifting, ambulation and eccentric single leg control. [] UE / cervical: cervical, scapular, scapulothoracic and UE control with self care, reaching, carrying, lifting, house/yardwork, driving, computer work. NMR and Therapeutic Activities:    [] (55341 or 75108) Provided verbal/tactile cueing for activities related to improving balance, coordination, kinesthetic sense, posture, motor skill, proprioception and motor activation to allow for proper function of   [] LE: / Lumbar core, proximal hip and LE with self care and ADLs  [] UE / Cervical: cervical, postural, scapular, scapulothoracic and UE control with self care, carrying, lifting, driving, computer work.   [] (15225) Gait Re-education- Provided training and instruction to the patient for proper LE, core and proximal hip recruitment and positioning and eccentric body weight control with ambulation re-education including up and down stairs     Home Management Training / Self Care:  [] (33907) Provided self-care/home management training related to activities of daily living and compensatory training, and/or use of adaptive equipment for improvement with: ADLs and compensatory training, meal preparation, safety procedures and instruction in use of adaptive equipment, including bathing, grooming, dressing, personal hygiene, basic household cleaning and chores.      Home Exercise Program:    [x] (24065) Reviewed/Progressed HEP activities related to strengthening, flexibility, endurance, ROM of   [] LE / Lumbar: core, proximal hip and LE for functional self-care, mobility, lifting and ambulation/stair navigation   [] UE / Cervical: cervical, postural, scapular, scapulothoracic and UE control with self care, reaching, carrying, lifting, house/yardwork, driving, computer work  [] (71155)Reviewed/Progressed HEP activities related to improving balance, coordination, kinesthetic sense, posture, motor skill, proprioception of   [] LE: core, proximal hip and LE for self care, mobility, lifting, and ambulation/stair navigation    [] UE / Cervical: cervical, postural,  scapular, scapulothoracic and UE control with self care, reaching, carrying, lifting, house/yardwork, driving, computer work    Manual Treatments:  PROM / STM / Oscillations-Mobs:  G-I, II, III, IV (PA's, Inf., Post.)  [] (07955) Provided manual therapy to mobilize LE, proximal hip and/or LS spine soft tissue/joints for the purpose of modulating pain, promoting relaxation,  increasing ROM, reducing/eliminating soft tissue swelling/inflammation/restriction, improving soft tissue extensibility and allowing for proper ROM for normal function with   [] LE / lumbar: self care, mobility, lifting and ambulation. [] UE / Cervical: self care, reaching, carrying, lifting, house/yardwork, driving, computer work. Modalities:  [] (98363) Vasopneumatic compression: Utilized vasopneumatic compression to decrease edema / swelling for the purpose of improving mobility and quad tone / recruitment which will allow for increased overall function including but not limited to self-care, transfers, ambulation, and ascending / descending stairs. Charges:  Timed Code Treatment Minutes: 41   Total Treatment Minutes: 41     [] EVAL - LOW (47834)   [] EVAL - MOD (63675)  [] EVAL - HIGH (23037)  [] RE-EVAL (44859)  [x] WJ(91674) x 2       [] Ionto  [] NMR (93207) x       [] Vaso  [x] Manual (68994) x  1      [] Ultrasound  [] TA x  1     [] Mech Traction (63783)  [] Aquatic Therapy x     [] ES (un) (61618):   [] Home Management Training x  [] ES(attended) (57380)   [] Dry Needling 1-2 muscles (67751):  [] Dry Needling 3+ muscles (897543)  [] Group:      [] Other:     GOALS:     Patient stated goal: relax nerves/muscles in neck. []? Progressing: []? Met: []? Not Met: []? Adjusted      Therapist goals for Patient: relax nerves/muscles in neck  Short Term Goals: To be achieved in: 2 weeks  1.  Independent in HEP and progression per patient tolerance, in order to prevent re-injury. []? Progressing: []? Met: []? Not Met: []? Adjusted  2. Patient will have a decrease in pain to facilitate improvement in movement, function, and ADLs as indicated by Functional Deficits. []? Progressing: []? Met: []? Not Met: []? Adjusted     Long Term Goals: To be achieved in: 4 weeks or discharge   1. Disability index score of 40% or less for the NDI to assist with reaching prior level of function. []? Progressing: []? Met: []? Not Met: []? Adjusted  2. Patient will demonstrate increased AROM cervical rotation by 15* of cervical/thoracic spine to allow for proper joint functioning as indicated by patients Functional Deficits. []? Progressing: []? Met: []? Not Met: []? Adjusted  3. Patient will demonstrate an increase in postural awareness and control and activation of  Deep cervical stabilizers to allow for proper functional mobility as indicated by patients Functional Deficits. []? Progressing: []? Met: []? Not Met: []? Adjusted  4. Patient will return to functional activities including turning head for improved driving without increased symptoms or restriction. []? Progressing: []? Met: []? Not Met: []? Adjusted       Overall Progression Towards Functional goals/ Treatment Progress Update:  [] Patient is progressing as expected towards functional goals listed. [] Progression is slowed due to complexities/Impairments listed. [] Progression has been slowed due to co-morbidities.   [x] Plan just implemented, too soon to assess goals progression <30days   [] Goals require adjustment due to lack of progress  [] Patient is not progressing as expected and requires additional follow up with physician  [] Other    Persisting Functional Limitations/Impairments:  [x]Sleeping [x]Sitting               []Standing [x]Transfers        [x]Walking []Kneeling               []Stairs []Squatting / bending   [x]ADLs []Reaching  []Lifting  [x]Housework  [x]Driving [x]Job related tasks  []Sports/Recreation []Other:        ASSESSMENT:  Good response to first session, able to show good scapular control and cervical ROM with minimal discomfort and normal response. Continue to work on manual and cervical/scapular stability and strength towards reducing pain levels and optimizing return to PLOF    Treatment/Activity Tolerance:  [x] Patient able to complete tx [] Patient limited by fatigue  [] Patient limited by pain  [] Patient limited by other medical complications  [] Other:     Prognosis: [x] Good [] Fair  [] Poor    Patient Requires Follow-up: [x] Yes  [] No    Plan for next treatment session:    PLAN: See eval. PT 1-2x / week for 4 weeks. [] Continue per plan of care [] Alter current plan (see comments)  [x] Plan of care initiated [] Hold pending MD visit [] Discharge    Electronically signed by: Jean-Pierre Murillo, PT PT    Note: If patient does not return for scheduled/ recommended follow up visits, this note will serve as a discharge from care along with most recent update on progress.

## 2021-10-21 ENCOUNTER — HOSPITAL ENCOUNTER (OUTPATIENT)
Dept: PHYSICAL THERAPY | Age: 71
Setting detail: THERAPIES SERIES
Discharge: HOME | End: 2021-10-21
Payer: MEDICARE

## 2021-10-21 PROCEDURE — 97140 MANUAL THERAPY 1/> REGIONS: CPT | Performed by: SPECIALIST/TECHNOLOGIST

## 2021-10-21 PROCEDURE — 97110 THERAPEUTIC EXERCISES: CPT | Performed by: SPECIALIST/TECHNOLOGIST

## 2021-10-21 NOTE — FLOWSHEET NOTE
[facility]  Phone: [phone]   Fax: [phone]    Physical Therapy Daily Treatment Note  Date:  10/21/2021    Patient Name:  Christa Heaton    :  1950  MRN: [id number]  Medical/Treatment Diagnosis Information:  · Diagnosis: M47.812 (ICD-10-CM) - Spondylosis without myelopathy or radiculopathy, cervical region  · Treatment Diagnosis: Chronic neck pain, limited cervical ROM, decreased postural dysfunction  Insurance/Certification information:  PT Insurance Information: Medicare  Physician Information:  Referring Practitioner: JOÃO Costa  Plan of care signed (Y/N): []  Yes [x]  No     Date of Patient follow up with Physician:      Progress Report: []  Yes  [x]  No     Date Range for reporting period:  Beginning: 10/8/21  Ending:     Progress report due (10 Rx/or 30 days whichever is less): visit #75 or 43/1    Recertification due (POC duration/ or 90 days whichever is less): visit #    Visit # Insurance Allowable Auth required? Date Range   3/6-8 [place] []  Yes  [x]  No          Latex Allergy:  [x]NO      []YES  Preferred Language for Healthcare:   [x]English       []other:    Functional Scale:        Date assessed:  NDI: raw score = 34, 68% dysfunction   10/8      Pain level:  6/10 Less pain after STM/ cervical ROM  10     SUBJECTIVE:   Pt reports having some increased neck and shoulders pain but has had some headaches recently. Does the best to manage without taking any meds but will take OTC PRN.      OBJECTIVE:   10/21 Pt has increased tightness over upper traps bilaterally, forward head posture      RESTRICTIONS/PRECAUTIONS: history of C2 fracture     Exercises/Interventions:  15'  Therapeutic Exercises (63332) Resistance / level Sets/sec Reps Notes   UBE X 2 mins fwd/bwd             Cervical AROM  10\" X 10 ea    UT stretch        Mid Rows Lime 2 10 Reviewed mechanics   High Rows Lime 2 10    LPD Lime 2 10           Chin Tucks on wall with ball   X 15    Chin Tucks with rotation   X 15 B    Prone Hughstons-- retractions/ extensions/ Habd/ scaption  All B   x15 ea 10/21          NO money orange 2 10x 10/ 21   Therapeutic Activities (86441) 6'       TB dispensed for HEP and discussed value of protecting posture with adls and seated activities around her home 6'   10/ 21                               Neuromuscular Re-ed (03993)                                                 Manual Intervention (85750) 24'       Gentle cervical PROM with focus on lateral flexion bilaterally, UT/levator stretch, trigger point release, SOR, gentle distraction,  X 24 mins                                              Modalities:     Pt. Education:  -patient educated on diagnosis, prognosis and expectations for rehab  -all patient questions were answered    Home Exercise Program:    Access Code: [email or web address]  URL: [email or web address]  Date: 10/08/2021  Prepared by: Riccardo Kwok    Exercises  Seated Scapular Retraction - 2 x daily - 7 x weekly - 2-3 sets - 10 reps  Seated Assisted Cervical Rotation with Towel - 2 x daily - 7 x weekly - 2-3 sets - 10 reps  Standing Cervical Sidebending AROM - 2 x daily - 7 x weekly - 2-3 sets - 10 reps  Standing Cervical Flexion AROM - 2 x daily - 7 x weekly - 2-3 sets - 10 reps    Therapeutic Exercise and NMR EXR  [] (23708) Provided verbal/tactile cueing for activities related to strengthening, flexibility, endurance, ROM for improvements in  [] LE / Lumbar: LE, proximal hip, and core control with self care, mobility, lifting, ambulation.   [] UE / Cervical: cervical, postural, scapular, scapulothoracic and UE control with self care, reaching, carrying, lifting, house/yardwork, driving, computer work.  [] (13977) Provided verbal/tactile cueing for activities related to improving balance, coordination, kinesthetic sense, posture, motor skill, proprioception to assist with   [] LE / lumbar: LE, proximal hip, and core control in self care, mobility, lifting, ambulation and eccentric single leg control. [] UE / cervical: cervical, scapular, scapulothoracic and UE control with self care, reaching, carrying, lifting, house/yardwork, driving, computer work.   [] (88299) Therapist is in constant attendance of 2 or more patients providing skilled therapy interventions, but not providing any significant amount of measurable one-on-one time to either patient, for improvements in  [] LE / lumbar: LE, proximal hip, and core control in self care, mobility, lifting, ambulation and eccentric single leg control. [] UE / cervical: cervical, scapular, scapulothoracic and UE control with self care, reaching, carrying, lifting, house/yardwork, driving, computer work. NMR and Therapeutic Activities:    [] (80401 or 98616) Provided verbal/tactile cueing for activities related to improving balance, coordination, kinesthetic sense, posture, motor skill, proprioception and motor activation to allow for proper function of   [] LE: / Lumbar core, proximal hip and LE with self care and ADLs  [] UE / Cervical: cervical, postural, scapular, scapulothoracic and UE control with self care, carrying, lifting, driving, computer work.   [] (95475) Gait Re-education- Provided training and instruction to the patient for proper LE, core and proximal hip recruitment and positioning and eccentric body weight control with ambulation re-education including up and down stairs     Home Management Training / Self Care:  [] (18663) Provided self-care/home management training related to activities of daily living and compensatory training, and/or use of adaptive equipment for improvement with: ADLs and compensatory training, meal preparation, safety procedures and instruction in use of adaptive equipment, including bathing, grooming, dressing, personal hygiene, basic household cleaning and chores.      Home Exercise Program:    [x] (02319) Reviewed/Progressed HEP activities related to strengthening, flexibility, endurance, ROM of   [] LE / Lumbar: core, proximal hip and LE for functional self-care, mobility, lifting and ambulation/stair navigation   [] UE / Cervical: cervical, postural, scapular, scapulothoracic and UE control with self care, reaching, carrying, lifting, house/yardwork, driving, computer work  [] (18477)Reviewed/Progressed HEP activities related to improving balance, coordination, kinesthetic sense, posture, motor skill, proprioception of   [] LE: core, proximal hip and LE for self care, mobility, lifting, and ambulation/stair navigation    [] UE / Cervical: cervical, postural,  scapular, scapulothoracic and UE control with self care, reaching, carrying, lifting, house/yardwork, driving, computer work    Manual Treatments:  PROM / STM / Oscillations-Mobs:  G-I, II, III, IV (PA's, Inf., Post.)  [] (90570) Provided manual therapy to mobilize LE, proximal hip and/or LS spine soft tissue/joints for the purpose of modulating pain, promoting relaxation,  increasing ROM, reducing/eliminating soft tissue swelling/inflammation/restriction, improving soft tissue extensibility and allowing for proper ROM for normal function with   [] LE / lumbar: self care, mobility, lifting and ambulation. [] UE / Cervical: self care, reaching, carrying, lifting, house/yardwork, driving, computer work. Modalities:  [] (43369) Vasopneumatic compression: Utilized vasopneumatic compression to decrease edema / swelling for the purpose of improving mobility and quad tone / recruitment which will allow for increased overall function including but not limited to self-care, transfers, ambulation, and ascending / descending stairs.      Charges:  Timed Code Treatment Minutes: 45   Total Treatment Minutes: 45     [] EVAL - LOW (72429)   [] EVAL - MOD (10899)  [] EVAL - HIGH (82267)  [] RE-EVAL (75542)  [x] ML(51264) x 1     [] Ionto  [] NMR (25299) x       [] Vaso  [x] Manual (15998) x  2 [] Ultrasound  [] TA x  1     [] [facility] Traction (35477)  [] Aquatic Therapy x     [] ES (un) (06904):   [] Home Management Training x  [] ES(attended) (24856)   [] Dry Needling 1-2 muscles (39684):  [] Dry Needling 3+ muscles (862769)  [] Group:      [] Other:     GOALS:     Patient stated goal: relax nerves/muscles in neck. []? Progressing: []? Met: []? Not Met: []? Adjusted      Therapist goals for Patient: relax nerves/muscles in neck  Short Term Goals: To be achieved in: 2 weeks  1. Independent in HEP and progression per patient tolerance, in order to prevent re-injury. []? Progressing: []? Met: []? Not Met: []? Adjusted  2. Patient will have a decrease in pain to facilitate improvement in movement, function, and ADLs as indicated by Functional Deficits. []? Progressing: []? Met: []? Not Met: []? Adjusted     Long Term Goals: To be achieved in: 4 weeks or discharge   1. Disability index score of 40% or less for the NDI to assist with reaching prior level of function. []? Progressing: []? Met: []? Not Met: []? Adjusted  2. Patient will demonstrate increased AROM cervical rotation by 15* of cervical/thoracic spine to allow for proper joint functioning as indicated by patients Functional Deficits. []? Progressing: []? Met: []? Not Met: []? Adjusted  3. Patient will demonstrate an increase in postural awareness and control and activation of  Deep cervical stabilizers to allow for proper functional mobility as indicated by patients Functional Deficits. []? Progressing: []? Met: []? Not Met: []? Adjusted  4. Patient will return to functional activities including turning head for improved driving without increased symptoms or restriction. []? Progressing: []? Met: []? Not Met: []? Adjusted       Overall Progression Towards Functional goals/ Treatment Progress Update:  [] Patient is progressing as expected towards functional goals listed.     [] Progression is slowed due to complexities/Impairments listed. [] Progression has been slowed due to co-morbidities. [x] Plan just implemented, too soon to assess goals progression <30days   [] Goals require adjustment due to lack of progress  [] Patient is not progressing as expected and requires additional follow up with physician  [] Other    Persisting Functional Limitations/Impairments:  [x]Sleeping [x]Sitting               []Standing [x]Transfers        [x]Walking []Kneeling               []Stairs []Squatting / bending   [x]ADLs []Reaching  []Lifting  [x]Housework  [x]Driving [x]Job related tasks  []Sports/Recreation []Other:        ASSESSMENT:  Pt reported a decrease in symptoms after manual therapy, SO release, and focus on cervical ROM. Hughston series added in prone today to emphasize posterior shoulder/ lower trap stabilization. Pt denied pain with progressions focusing on RTC and posterior shoulder activation. Continue to work on manual and cervical/scapular stability and strength towards reducing pain levels and optimizing return to PLOF    Treatment/Activity Tolerance:  [x] Patient able to complete tx [] Patient limited by fatigue  [] Patient limited by pain  [] Patient limited by other medical complications  [] Other:     Prognosis: [x] Good [] Fair  [] Poor    Patient Requires Follow-up: [x] Yes  [] No    Plan for next treatment session:    PLAN:  PT 1-2x / week for 4 weeks. [x] Continue per plan of care [] Alter current plan (see comments)  [] Plan of care initiated [] Hold pending MD visit [] Discharge    Electronically signed by: Teresita Keller, PTA, [id number]    Note: If patient does not return for scheduled/ recommended follow up visits, this note will serve as a discharge from care along with most recent update on progress.

## 2021-10-25 ENCOUNTER — HOSPITAL ENCOUNTER (OUTPATIENT)
Dept: PHYSICAL THERAPY | Age: 71
Setting detail: THERAPIES SERIES
Discharge: HOME | End: 2021-10-25
Payer: MEDICARE

## 2021-10-25 PROCEDURE — 97110 THERAPEUTIC EXERCISES: CPT

## 2021-10-25 PROCEDURE — 97530 THERAPEUTIC ACTIVITIES: CPT

## 2021-10-25 NOTE — FLOWSHEET NOTE
[facility]  Phone: [phone]   Fax: [phone]    Physical Therapy Daily Treatment Note  Date:  10/25/2021    Patient Name:  Gokul Suarez    :  1950  MRN: [id number]  Medical/Treatment Diagnosis Information:  · Diagnosis: M47.812 (ICD-10-CM) - Spondylosis without myelopathy or radiculopathy, cervical region  · Treatment Diagnosis: Chronic neck pain, limited cervical ROM, decreased postural dysfunction  Insurance/Certification information:  PT Insurance Information: Medicare  Physician Information:  Referring Practitioner: JOÃO Payne  Plan of care signed (Y/N): [x]  Yes []  No     Date of Patient follow up with Physician:      Progress Report: []  Yes  [x]  No     Date Range for reporting period:  Beginning: 10/8/21  Ending:     Progress report due (10 Rx/or 30 days whichever is less): visit #02 or     Recertification due (POC duration/ or 90 days whichever is less): visit #    Visit # Insurance Allowable Auth required? Date Range   - [place] []  Yes  [x]  No          Latex Allergy:  [x]NO      []YES  Preferred Language for Healthcare:   [x]English       []other:    Functional Scale:        Date assessed:  NDI: raw score = 34, 68% dysfunction   10/8      Pain level:  610     SUBJECTIVE:   Pt reports that she is achy today, blaming it on the rain. She woke up around 6/10. Has to really take her time when sitting up on her bed in the morning.      OBJECTIVE:   10/21 Pt has increased tightness over upper traps bilaterally, forward head posture      RESTRICTIONS/PRECAUTIONS: history of C2 fracture     Exercises/Interventions:    Therapeutic Exercises (70313) Resistance / level Sets/sec Reps Notes   UBE X 2 mins fwd/bwd             Cervical AROM     UT stretch        Mid Rows Lime 2 10 Reviewed mechanics   High Rows Lime 2 10    LPD Lime 2 10           Chin Tucks on wall with ball   X 20    Chin Tucks with rotation   X 15 B Prone Hughstons-- retractions/ extensions/ Habd/ scaption  All B   10/21   NO Money orange 2 10/21                               Therapeutic Activities (01609)        TB dispensed for HEP and discussed value of protecting posture with adls and seated activities around her home    10/ 21          Time during session spent on edu pt on log roll technique to help alleviate symptoms 8'   10/25                 Neuromuscular Re-ed (26574)                                                 Manual Intervention ([id number])        Gentle cervical PROM with focus on lateral flexion bilaterally, UT/levator stretch, trigger point release, SOR, gentle distraction,               Gentle cervical traction, STM with trigger point release in B UTs X 5 min                               Modalities: 10/25: MHP to cervical spine in supine x 10 min    Pt. Education:  -patient educated on diagnosis, prognosis and expectations for rehab  -all patient questions were answered  10/25: Discussed with patient finding community classes that she can participate in. Also discussed with patient looking up places to get massage - PT suggested [place] Exercise Program:    Access Code: [email or web address]  URL: [email or web address]  Date: 10/08/2021  Prepared by: Freddie Miller    Exercises  Seated Scapular Retraction - 2 x daily - 7 x weekly - 2-3 sets - 10 reps  Seated Assisted Cervical Rotation with Towel - 2 x daily - 7 x weekly - 2-3 sets - 10 reps  Standing Cervical Sidebending AROM - 2 x daily - 7 x weekly - 2-3 sets - 10 reps  Standing Cervical Flexion AROM - 2 x daily - 7 x weekly - 2-3 sets - 10 reps    Therapeutic Exercise and NMR EXR  [x] (71978) Provided verbal/tactile cueing for activities related to strengthening, flexibility, endurance, ROM for improvements in  [] LE / Lumbar: LE, proximal hip, and core control with self care, mobility, lifting, ambulation.   [x] UE / Cervical: cervical, postural, scapular, scapulothoracic and UE control with self care, reaching, carrying, lifting, house/yardwork, driving, computer work.  [] (10721) Provided verbal/tactile cueing for activities related to improving balance, coordination, kinesthetic sense, posture, motor skill, proprioception to assist with   [] LE / lumbar: LE, proximal hip, and core control in self care, mobility, lifting, ambulation and eccentric single leg control. [] UE / cervical: cervical, scapular, scapulothoracic and UE control with self care, reaching, carrying, lifting, house/yardwork, driving, computer work.   [] (52662) Therapist is in constant attendance of 2 or more patients providing skilled therapy interventions, but not providing any significant amount of measurable one-on-one time to either patient, for improvements in  [] LE / lumbar: LE, proximal hip, and core control in self care, mobility, lifting, ambulation and eccentric single leg control. [] UE / cervical: cervical, scapular, scapulothoracic and UE control with self care, reaching, carrying, lifting, house/yardwork, driving, computer work.      NMR and Therapeutic Activities:    [] (91011 or 85048) Provided verbal/tactile cueing for activities related to improving balance, coordination, kinesthetic sense, posture, motor skill, proprioception and motor activation to allow for proper function of   [] LE: / Lumbar core, proximal hip and LE with self care and ADLs  [] UE / Cervical: cervical, postural, scapular, scapulothoracic and UE control with self care, carrying, lifting, driving, computer work.   [] (91394) Gait Re-education- Provided training and instruction to the patient for proper LE, core and proximal hip recruitment and positioning and eccentric body weight control with ambulation re-education including up and down stairs     Home Management Training / Self Care:  [] (75437) Provided self-care/home management training related to activities of daily living and compensatory training, self-care, transfers, ambulation, and ascending / descending stairs. Charges:  Timed Code Treatment Minutes: 44   Total Treatment Minutes: 54     [] EVAL - LOW (20941)   [] EVAL - MOD (99064)  [] EVAL - HIGH (12788)  [] RE-EVAL (86483)  [x] OO(13197) x 2     [] Ionto  [] NMR (96879) x       [] Vaso  [] Manual (48295) x        [] Ultrasound  [x] TA x  1     [] Mech Traction (20330)  [] Aquatic Therapy x     [] ES (un) (78122):   [] Home Management Training x  [] ES(attended) (55771)   [] Dry Needling 1-2 muscles (12471):  [] Dry Needling 3+ muscles (287155)  [] Group:      [] Other:     GOALS:     Patient stated goal: relax nerves/muscles in neck. []? Progressing: []? Met: []? Not Met: []? Adjusted      Therapist goals for Patient: relax nerves/muscles in neck  Short Term Goals: To be achieved in: 2 weeks  1. Independent in HEP and progression per patient tolerance, in order to prevent re-injury. []? Progressing: []? Met: []? Not Met: []? Adjusted  2. Patient will have a decrease in pain to facilitate improvement in movement, function, and ADLs as indicated by Functional Deficits. []? Progressing: []? Met: []? Not Met: []? Adjusted     Long Term Goals: To be achieved in: 4 weeks or discharge   1. Disability index score of 40% or less for the NDI to assist with reaching prior level of function. []? Progressing: []? Met: []? Not Met: []? Adjusted  2. Patient will demonstrate increased AROM cervical rotation by 15* of cervical/thoracic spine to allow for proper joint functioning as indicated by patients Functional Deficits. []? Progressing: []? Met: []? Not Met: []? Adjusted  3. Patient will demonstrate an increase in postural awareness and control and activation of  Deep cervical stabilizers to allow for proper functional mobility as indicated by patients Functional Deficits. []? Progressing: []? Met: []? Not Met: []? Adjusted  4.  Patient will return to functional activities including turning head for improved driving without increased symptoms or restriction. []? Progressing: []? Met: []? Not Met: []? Adjusted       Overall Progression Towards Functional goals/ Treatment Progress Update:  [] Patient is progressing as expected towards functional goals listed. [] Progression is slowed due to complexities/Impairments listed. [] Progression has been slowed due to co-morbidities. [x] Plan just implemented, too soon to assess goals progression <30days   [] Goals require adjustment due to lack of progress  [] Patient is not progressing as expected and requires additional follow up with physician  [] Other    Persisting Functional Limitations/Impairments:  [x]Sleeping [x]Sitting               []Standing [x]Transfers        [x]Walking []Kneeling               []Stairs []Squatting / bending   [x]ADLs []Reaching  []Lifting  [x]Housework  [x]Driving [x]Job related tasks  []Sports/Recreation []Other:        ASSESSMENT:  Educated pt on log roll technique to help reduce some symptoms patient has when getting up out of bed in the morning. Also discussed potential ways for pt to get involved in community again. Pt able to tolerate exercises this session and no complaints afterwards. Continue to work on cervical ROM and manual techniques to reduce muscular tightness. Treatment/Activity Tolerance:  [x] Patient able to complete tx [] Patient limited by fatigue  [] Patient limited by pain  [] Patient limited by other medical complications  [] Other:     Prognosis: [x] Good [] Fair  [] Poor    Patient Requires Follow-up: [x] Yes  [] No    Plan for next treatment session:    PLAN:  PT 1-2x / week for 4 weeks.    [x] Continue per plan of care [] Alter current plan (see comments)  [] Plan of care initiated [] Hold pending MD visit [] Discharge    Electronically signed by: Jacqueline Allen, PT, DPT    Note: If patient does not return for scheduled/ recommended follow up visits, this note will serve as a discharge from care along with most recent update on progress.

## 2021-10-28 ENCOUNTER — HOSPITAL ENCOUNTER (OUTPATIENT)
Dept: PHYSICAL THERAPY | Age: 71
Setting detail: THERAPIES SERIES
Discharge: HOME | End: 2021-10-28
Payer: MEDICARE

## 2021-10-28 ENCOUNTER — TELEPHONE (OUTPATIENT)
Dept: ORTHOPEDIC SURGERY | Age: 71
End: 2021-10-28

## 2021-10-28 PROCEDURE — 97110 THERAPEUTIC EXERCISES: CPT

## 2021-10-28 PROCEDURE — 97140 MANUAL THERAPY 1/> REGIONS: CPT

## 2021-10-28 NOTE — TELEPHONE ENCOUNTER
Dear PCP Provider: [facility]) has partnered with [facility] to utilize predictive analytics to improve the care management for patients with arthritic knee pain. Utilizing claims data and patient visit features, we have developed an algorithmic risk score to determine which patient's quality of life may be most impacted to their knee pain. The selection criteria for this  program are: 1) risk score greater than .85; 2) existing [address] [place] [address] Floor patient; and 3) seen for knee pain in the past 3 years. Based upon the predictive analytics risk score  program, your patient has a risk score of greater than . 85 (i.e. 85% probability in having their quality of life impacted by their knee pain). To assist with care management of your patient, a navigator will be contacting them to understand their knee pain status and to educate on the [address] Pain Program, including scheduling an appointment with a joint specialist or their PCP. If you feel this patient not appropriate to be contacted given other medical reasons, please reply back to the navigator within 7 days with reason why not to be contacted. Otherwise, the navigator will follow up with you on the details of the patient encounter after the call.  Thank you for your support for this program.

## 2021-10-28 NOTE — FLOWSHEET NOTE
[address] [facility] Physical Therapy  Phone: [phone]   Fax: [phone]    Physical Therapy Daily Treatment Note  Date:  10/28/2021    Patient Name:  Karlee Ohara    :  1950  MRN: [id number]  Medical/Treatment Diagnosis Information:  · Diagnosis: M47.812 (ICD-10-CM) - Spondylosis without myelopathy or radiculopathy, cervical region  · Treatment Diagnosis: Chronic neck pain, limited cervical ROM, decreased postural dysfunction  Insurance/Certification information:  PT Insurance Information: Medicare  Physician Information:  Referring Practitioner: JOÃO Herrera  Plan of care signed (Y/N): [x]  Yes []  No     Date of Patient follow up with Physician:      Progress Report: []  Yes  [x]  No     Date Range for reporting period:  Beginning: 10/8/21  Ending:     Progress report due (10 Rx/or 30 days whichever is less): visit #87 or     Recertification due (POC duration/ or 90 days whichever is less): visit #    Visit # Insurance Allowable Auth required? Date Range   -8 [place] []  Yes  [x]  No          Latex Allergy:  [x]NO      []YES  Preferred Language for Healthcare:   [x]English       []other:    Functional Scale:        Date assessed:  NDI: raw score = 34, 68% dysfunction   10/8      Pain level:  6/10     SUBJECTIVE:  Thinks the weather is affecting her, head/neck pretty much the same, still has to turn her whole body to rotate to the right.          OBJECTIVE:   10/21 Pt has increased tightness over upper traps bilaterally, forward head posture      RESTRICTIONS/PRECAUTIONS: history of C2 fracture      Exercises/Interventions:    Therapeutic Exercises (49214) Resistance / level Sets/sec Reps Notes   UBE X 2 mins fwd/bwd             Cervical AROM     UT stretch   30' X 2     Mid Rows Lime 2 10 Reviewed mechanics   High Rows Lime 2 10    LPD Lime 2 10    Thread the Needle   X 5 B    Chin Tucks on wall with ball   X 20    Chin Tucks with rotation   X 15 UE / Cervical: cervical, postural, scapular, scapulothoracic and UE control with self care, reaching, carrying, lifting, house/yardwork, driving, computer work.  [] (29368) Provided verbal/tactile cueing for activities related to improving balance, coordination, kinesthetic sense, posture, motor skill, proprioception to assist with   [] LE / lumbar: LE, proximal hip, and core control in self care, mobility, lifting, ambulation and eccentric single leg control. [] UE / cervical: cervical, scapular, scapulothoracic and UE control with self care, reaching, carrying, lifting, house/yardwork, driving, computer work.   [] (38103) Therapist is in constant attendance of 2 or more patients providing skilled therapy interventions, but not providing any significant amount of measurable one-on-one time to either patient, for improvements in  [] LE / lumbar: LE, proximal hip, and core control in self care, mobility, lifting, ambulation and eccentric single leg control. [] UE / cervical: cervical, scapular, scapulothoracic and UE control with self care, reaching, carrying, lifting, house/yardwork, driving, computer work.      NMR and Therapeutic Activities:    [] (22685 or 77158) Provided verbal/tactile cueing for activities related to improving balance, coordination, kinesthetic sense, posture, motor skill, proprioception and motor activation to allow for proper function of   [] LE: / Lumbar core, proximal hip and LE with self care and ADLs  [] UE / Cervical: cervical, postural, scapular, scapulothoracic and UE control with self care, carrying, lifting, driving, computer work.   [] (04117) Gait Re-education- Provided training and instruction to the patient for proper LE, core and proximal hip recruitment and positioning and eccentric body weight control with ambulation re-education including up and down stairs     Home Management Training / Self Care:  [] (68624) Provided self-care/home management training related to activities of daily living and compensatory training, and/or use of adaptive equipment for improvement with: ADLs and compensatory training, meal preparation, safety procedures and instruction in use of adaptive equipment, including bathing, grooming, dressing, personal hygiene, basic household cleaning and chores. Home Exercise Program:    [x] (47867) Reviewed/Progressed HEP activities related to strengthening, flexibility, endurance, ROM of   [] LE / Lumbar: core, proximal hip and LE for functional self-care, mobility, lifting and ambulation/stair navigation   [] UE / Cervical: cervical, postural, scapular, scapulothoracic and UE control with self care, reaching, carrying, lifting, house/yardwork, driving, computer work  [] (21217)Reviewed/Progressed HEP activities related to improving balance, coordination, kinesthetic sense, posture, motor skill, proprioception of   [] LE: core, proximal hip and LE for self care, mobility, lifting, and ambulation/stair navigation    [] UE / Cervical: cervical, postural,  scapular, scapulothoracic and UE control with self care, reaching, carrying, lifting, house/yardwork, driving, computer work    Manual Treatments:  PROM / STM / Oscillations-Mobs:  G-I, II, III, IV (PA's, Inf., Post.)  [] (29262) Provided manual therapy to mobilize LE, proximal hip and/or LS spine soft tissue/joints for the purpose of modulating pain, promoting relaxation,  increasing ROM, reducing/eliminating soft tissue swelling/inflammation/restriction, improving soft tissue extensibility and allowing for proper ROM for normal function with   [] LE / lumbar: self care, mobility, lifting and ambulation. [] UE / Cervical: self care, reaching, carrying, lifting, house/yardwork, driving, computer work.      Modalities:  [] (77465) Vasopneumatic compression: Utilized vasopneumatic compression to decrease edema / swelling for the purpose of improving mobility and quad tone / recruitment which will allow for increased overall function including but not limited to self-care, transfers, ambulation, and ascending / descending stairs. Charges:  Timed Code Treatment Minutes: 44   Total Treatment Minutes: 54     [] EVAL - LOW (72164)   [] EVAL - MOD (79193)  [] EVAL - HIGH (74199)  [] RE-EVAL (82421)  [x] PK(74689) x 2     [] Ionto  [] NMR (41625) x       [] Vaso  [x] Manual (03964) x  1      [] Ultrasound  [] TA x  1     [] Mech Traction (79020)  [] Aquatic Therapy x     [] ES (un) (09274):   [] Home Management Training x  [] ES(attended) (81897)   [] Dry Needling 1-2 muscles (65756):  [] Dry Needling 3+ muscles (954700)  [] Group:      [] Other:     GOALS:     Patient stated goal: relax nerves/muscles in neck. []? Progressing: []? Met: []? Not Met: []? Adjusted      Therapist goals for Patient: relax nerves/muscles in neck  Short Term Goals: To be achieved in: 2 weeks  1. Independent in HEP and progression per patient tolerance, in order to prevent re-injury. []? Progressing: []? Met: []? Not Met: []? Adjusted  2. Patient will have a decrease in pain to facilitate improvement in movement, function, and ADLs as indicated by Functional Deficits. []? Progressing: []? Met: []? Not Met: []? Adjusted     Long Term Goals: To be achieved in: 4 weeks or discharge   1. Disability index score of 40% or less for the NDI to assist with reaching prior level of function. []? Progressing: []? Met: []? Not Met: []? Adjusted  2. Patient will demonstrate increased AROM cervical rotation by 15* of cervical/thoracic spine to allow for proper joint functioning as indicated by patients Functional Deficits. []? Progressing: []? Met: []? Not Met: []? Adjusted  3. Patient will demonstrate an increase in postural awareness and control and activation of  Deep cervical stabilizers to allow for proper functional mobility as indicated by patients Functional Deficits. []? Progressing: []? Met: []? Not Met: []? Adjusted  4.  Patient will return to functional activities including turning head for improved driving without increased symptoms or restriction. []? Progressing: []? Met: []? Not Met: []? Adjusted       Overall Progression Towards Functional goals/ Treatment Progress Update:  [] Patient is progressing as expected towards functional goals listed. [] Progression is slowed due to complexities/Impairments listed. [] Progression has been slowed due to co-morbidities. [x] Plan just implemented, too soon to assess goals progression <30days   [] Goals require adjustment due to lack of progress  [] Patient is not progressing as expected and requires additional follow up with physician  [] Other    Persisting Functional Limitations/Impairments:  [x]Sleeping [x]Sitting               []Standing [x]Transfers        [x]Walking []Kneeling               []Stairs []Squatting / bending   [x]ADLs []Reaching  []Lifting  [x]Housework  [x]Driving [x]Job related tasks  []Sports/Recreation []Other:        ASSESSMENT:   Pt able to tolerate exercises this session and no complaints afterwards. Continue to work on cervical ROM and manual techniques to reduce muscular tightness. Treatment/Activity Tolerance:  [x] Patient able to complete tx [] Patient limited by fatigue  [] Patient limited by pain  [] Patient limited by other medical complications  [] Other:     Prognosis: [x] Good [] Fair  [] Poor    Patient Requires Follow-up: [x] Yes  [] No    Plan for next treatment session:    PLAN:  PT 1-2x / week for 4 weeks. [x] Continue per plan of care [] Alter current plan (see comments)  [] Plan of care initiated [] Hold pending MD visit [] Discharge    Electronically signed by: Ewelina Lao PT, PT    Note: If patient does not return for scheduled/ recommended follow up visits, this note will serve as a discharge from care along with most recent update on progress.

## 2021-10-29 ENCOUNTER — TELEPHONE (OUTPATIENT)
Dept: ORTHOPEDIC SURGERY | Age: 71
End: 2021-10-29

## 2021-11-01 ENCOUNTER — HOSPITAL ENCOUNTER (OUTPATIENT)
Dept: PHYSICAL THERAPY | Age: 71
Setting detail: THERAPIES SERIES
End: 2021-11-01
Payer: MEDICARE

## 2021-11-03 ENCOUNTER — TELEPHONE (OUTPATIENT)
Dept: ORTHOPEDIC SURGERY | Age: 71
End: 2021-11-03

## 2021-11-03 NOTE — TELEPHONE ENCOUNTER
Patient was scheduled for an orthopedic visit on 11/24/2021 as a part of our [facility] program.  The appointment is with Dr. Denisse Desir to further evaluate their knee pain and review treatment options. Patient is currently using exercises she learned in Physical Therapy to help decrease symptoms of knee pain and it has provided some relief. Patient stated that her left knee is still giving her trouble and that she thinks it would be beneficial to have a provider take a look at it.

## 2021-11-04 ENCOUNTER — HOSPITAL ENCOUNTER (OUTPATIENT)
Dept: PHYSICAL THERAPY | Age: 71
Setting detail: THERAPIES SERIES
Discharge: HOME | End: 2021-11-04
Payer: MEDICARE

## 2021-11-04 PROCEDURE — 97140 MANUAL THERAPY 1/> REGIONS: CPT

## 2021-11-04 PROCEDURE — 97110 THERAPEUTIC EXERCISES: CPT

## 2021-11-04 NOTE — FLOWSHEET NOTE
[address] [facility] Physical Therapy  Phone: [phone]   Fax: [phone]    Physical Therapy Daily Treatment Note  Date:  2021    Patient Name:  Tigist Pearson    :  1950  MRN: [id number]  Medical/Treatment Diagnosis Information:  · Diagnosis: M47.812 (ICD-10-CM) - Spondylosis without myelopathy or radiculopathy, cervical region  · Treatment Diagnosis: Chronic neck pain, limited cervical ROM, decreased postural dysfunction  Insurance/Certification information:  PT Insurance Information: Medicare  Physician Information:  Referring Practitioner: JOÃO Wesley  Plan of care signed (Y/N): [x]  Yes []  No     Date of Patient follow up with Physician:      Progress Report: []  Yes  [x]  No     Date Range for reporting period:  Beginning: 10/8/21  Ending:     Progress report due (10 Rx/or 30 days whichever is less): visit #26 or     Recertification due (POC duration/ or 90 days whichever is less): visit #    Visit # Insurance Allowable Auth required? Date Range   - [place] []  Yes  [x]  No          Latex Allergy:  [x]NO      []YES  Preferred Language for Healthcare:   [x]English       []other:    Functional Scale:        Date assessed:  NDI: raw score = 34, 68% dysfunction   10/8      Pain level:  6/10     SUBJECTIVE:  Still kind of bothers her, able to do everything she is capable of doing, but it still affects driving with limited movement/mobility. Tries not to overextend to the right, but doing the best she can. Has some arthritis in her knee that she is due to see Dr. Maury Olson for to get PT for to strengthen it. Doesn't want to get injections in the knee because she's scared.             OBJECTIVE:    Pt 10 mins late for appt   10/21 Pt has increased tightness over upper traps bilaterally, forward head posture      RESTRICTIONS/PRECAUTIONS: history of C2 fracture      Exercises/Interventions:    Therapeutic Exercises (78310) Resistance / level Sets/sec Reps Notes   UBE X 2 mins fwd/bwd             Cervical AROM     UT stretch   30' X 2     Mid Rows 30# 2 10 Reviewed mechanics   High Rows Lime 2 10    LPD 25# 2 10    Thread the Needle   X 5 B    Chin Tucks on wall with ball   X 20    Chin Tucks with rotation   X 15 B    Prone [employer]-- retractions/ extensions/ Habd/ scaption  All B   10/21   NO Money Lime 2 10x10/21   Wall slides with scap squeeze   10x 10/28                        Therapeutic Activities (31423)        TB dispensed for HEP and discussed value of protecting posture with adls and seated activities around her home    10/ 21          Time during session spent on edu pt on log roll technique to help alleviate symptoms                     Neuromuscular Re-ed (29307)                                                 Manual Intervention (26890)        Gentle cervical PROM with focus on lateral flexion bilaterally, UT/levator stretch, trigger point release, SOR, gentle distraction,               Gentle cervical traction, STM with trigger point release in B UTs X 12 min                                Modalities: 11/4, Declined  10/28, 10/25: MHP to cervical spine in supine x 10 min    Pt. Education:  -patient educated on diagnosis, prognosis and expectations for rehab  -all patient questions were answered  10/25: Discussed with patient finding community classes that she can participate in. Also discussed with patient looking up places to get massage - PT suggested [place] [facility] Program:    Access Code: [email or web address]  URL: [email or web address]  Date: 10/08/2021  Prepared by: Justin Mendez    Exercises  Seated Scapular Retraction - 2 x daily - 7 x weekly - 2-3 sets - 10 reps  Seated Assisted Cervical Rotation with Towel - 2 x daily - 7 x weekly - 2-3 sets - 10 reps  Standing Cervical Sidebending AROM - 2 x daily - 7 x weekly - 2-3 sets - 10 reps  Standing Cervical Flexion AROM - 2 x daily - 7 x weekly - 2-3 sets - 10 reps    Therapeutic Exercise and NMR EXR  [x] (99884) Provided verbal/tactile cueing for activities related to strengthening, flexibility, endurance, ROM for improvements in  [] LE / Lumbar: LE, proximal hip, and core control with self care, mobility, lifting, ambulation. [x] UE / Cervical: cervical, postural, scapular, scapulothoracic and UE control with self care, reaching, carrying, lifting, house/yardwork, driving, computer work.  [] (85038) Provided verbal/tactile cueing for activities related to improving balance, coordination, kinesthetic sense, posture, motor skill, proprioception to assist with   [] LE / lumbar: LE, proximal hip, and core control in self care, mobility, lifting, ambulation and eccentric single leg control. [] UE / cervical: cervical, scapular, scapulothoracic and UE control with self care, reaching, carrying, lifting, house/yardwork, driving, computer work.   [] (42073) Therapist is in constant attendance of 2 or more patients providing skilled therapy interventions, but not providing any significant amount of measurable one-on-one time to either patient, for improvements in  [] LE / lumbar: LE, proximal hip, and core control in self care, mobility, lifting, ambulation and eccentric single leg control. [] UE / cervical: cervical, scapular, scapulothoracic and UE control with self care, reaching, carrying, lifting, house/yardwork, driving, computer work.      NMR and Therapeutic Activities:    [] (61141 or 84050) Provided verbal/tactile cueing for activities related to improving balance, coordination, kinesthetic sense, posture, motor skill, proprioception and motor activation to allow for proper function of   [] LE: / Lumbar core, proximal hip and LE with self care and ADLs  [] UE / Cervical: cervical, postural, scapular, scapulothoracic and UE control with self care, carrying, lifting, driving, computer work.   [] (98987) Gait Re-education- Provided training and instruction to the patient for proper LE, core and proximal hip recruitment and positioning and eccentric body weight control with ambulation re-education including up and down stairs     Home Management Training / Self Care:  [] (78604) Provided self-care/home management training related to activities of daily living and compensatory training, and/or use of adaptive equipment for improvement with: ADLs and compensatory training, meal preparation, safety procedures and instruction in use of adaptive equipment, including bathing, grooming, dressing, personal hygiene, basic household cleaning and chores. Home Exercise Program:    [x] (36328) Reviewed/Progressed HEP activities related to strengthening, flexibility, endurance, ROM of   [] LE / Lumbar: core, proximal hip and LE for functional self-care, mobility, lifting and ambulation/stair navigation   [] UE / Cervical: cervical, postural, scapular, scapulothoracic and UE control with self care, reaching, carrying, lifting, house/yardwork, driving, computer work  [] (78608)Reviewed/Progressed HEP activities related to improving balance, coordination, kinesthetic sense, posture, motor skill, proprioception of   [] LE: core, proximal hip and LE for self care, mobility, lifting, and ambulation/stair navigation    [] UE / Cervical: cervical, postural,  scapular, scapulothoracic and UE control with self care, reaching, carrying, lifting, house/yardwork, driving, computer work    Manual Treatments:  PROM / STM / Oscillations-Mobs:  G-I, II, III, IV (PA's, Inf., Post.)  [] (17818) Provided manual therapy to mobilize LE, proximal hip and/or LS spine soft tissue/joints for the purpose of modulating pain, promoting relaxation,  increasing ROM, reducing/eliminating soft tissue swelling/inflammation/restriction, improving soft tissue extensibility and allowing for proper ROM for normal function with   [] LE / lumbar: self care, mobility, lifting and ambulation.     [] UE / Cervical: self care, reaching, carrying, lifting, house/yardwork, driving, computer work. Modalities:  [] (10423) Vasopneumatic compression: Utilized vasopneumatic compression to decrease edema / swelling for the purpose of improving mobility and quad tone / recruitment which will allow for increased overall function including but not limited to self-care, transfers, ambulation, and ascending / descending stairs. Charges:  Timed Code Treatment Minutes: 39   Total Treatment Minutes: 39     [] EVAL - LOW (18221)   [] EVAL - MOD (18160)  [] EVAL - HIGH (34769)  [] RE-EVAL (65118)  [x] WO(77777) x 2     [] Ionto  [] NMR (71558) x       [] Vaso  [x] Manual (73148) x  1      [] Ultrasound  [] TA x  1     [] Mech Traction (66873)  [] Aquatic Therapy x     [] ES (un) (58512):   [] Home Management Training x  [] ES(attended) (89453)   [] Dry Needling 1-2 muscles (79609):  [] Dry Needling 3+ muscles (088389)  [] Group:      [] Other:     GOALS:     Patient stated goal: relax nerves/muscles in neck. []? Progressing: []? Met: []? Not Met: []? Adjusted      Therapist goals for Patient: relax nerves/muscles in neck  Short Term Goals: To be achieved in: 2 weeks  1. Independent in HEP and progression per patient tolerance, in order to prevent re-injury. []? Progressing: []? Met: []? Not Met: []? Adjusted  2. Patient will have a decrease in pain to facilitate improvement in movement, function, and ADLs as indicated by Functional Deficits. []? Progressing: []? Met: []? Not Met: []? Adjusted     Long Term Goals: To be achieved in: 4 weeks or discharge   1. Disability index score of 40% or less for the NDI to assist with reaching prior level of function. []? Progressing: []? Met: []? Not Met: []? Adjusted  2. Patient will demonstrate increased AROM cervical rotation by 15* of cervical/thoracic spine to allow for proper joint functioning as indicated by patients Functional Deficits. []? Progressing: []? Met: []?  Not Met: []? Adjusted  3. Patient will demonstrate an increase in postural awareness and control and activation of  Deep cervical stabilizers to allow for proper functional mobility as indicated by patients Functional Deficits. []? Progressing: []? Met: []? Not Met: []? Adjusted  4. Patient will return to functional activities including turning head for improved driving without increased symptoms or restriction. []? Progressing: []? Met: []? Not Met: []? Adjusted       Overall Progression Towards Functional goals/ Treatment Progress Update:  [] Patient is progressing as expected towards functional goals listed. [] Progression is slowed due to complexities/Impairments listed. [] Progression has been slowed due to co-morbidities. [x] Plan just implemented, too soon to assess goals progression <30days   [] Goals require adjustment due to lack of progress  [] Patient is not progressing as expected and requires additional follow up with physician  [] Other    Persisting Functional Limitations/Impairments:  [x]Sleeping [x]Sitting               []Standing [x]Transfers        [x]Walking []Kneeling               []Stairs []Squatting / bending   [x]ADLs []Reaching  []Lifting  [x]Housework  [x]Driving [x]Job related tasks  []Sports/Recreation []Other:        ASSESSMENT:   Pt able to tolerate exercises this session and no complaints afterwards. She is responding well to interventions and HEP. Continue to progress. PN next visit, she seems likely to desire d/c after next couple visits, and wants to concentrate on the knee that she plans to get an eval/treat order for. Continue to work on cervical ROM and manual techniques to reduce muscular tightness.      Treatment/Activity Tolerance:  [x] Patient able to complete tx [] Patient limited by fatigue  [] Patient limited by pain  [] Patient limited by other medical complications  [] Other:     Prognosis: [x] Good [] Fair  [] Poor    Patient Requires Follow-up: [x] Yes  [] No    Plan for next treatment session:    PLAN:  PT 1-2x / week for 4 weeks. [x] Continue per plan of care [] Alter current plan (see comments)  [] Plan of care initiated [] Hold pending MD visit [] Discharge    Electronically signed by: Jc Jerry, PT, PT    Note: If patient does not return for scheduled/ recommended follow up visits, this note will serve as a discharge from care along with most recent update on progress.

## 2021-11-08 ENCOUNTER — HOSPITAL ENCOUNTER (OUTPATIENT)
Dept: PHYSICAL THERAPY | Age: 71
Setting detail: THERAPIES SERIES
Discharge: HOME | End: 2021-11-08
Payer: MEDICARE

## 2021-11-08 PROCEDURE — 97110 THERAPEUTIC EXERCISES: CPT

## 2021-11-08 PROCEDURE — 97140 MANUAL THERAPY 1/> REGIONS: CPT

## 2021-11-08 PROCEDURE — 97530 THERAPEUTIC ACTIVITIES: CPT

## 2021-11-08 NOTE — FLOWSHEET NOTE
[address] [facility]  Phone: [phone]   Fax: [phone]    Physical Therapy Re-Certification Plan of Care    Dear JOÃO Steel    We had the pleasure of treating the following patient for physical therapy services at [facility]. A summary of our findings can be found in the updated assessment below. This includes our plan of care. If you have any questions or concerns regarding these findings, please do not hesitate to contact me at the office phone number checked above.   Thank you for the referral.     Physician Signature:________________________________Date:__________________  By signing above (or electronic signature), therapist's plan is approved by physician      Functional Outcome:   NDI:  28, 56%     OBJECTIVE:     Bold denotes improvement       Dermatomes Normal Abnormal Comments   Top of head (C1) x       Posterior occipital region (C2) x       Side of neck (C3) x       Top of shoulder (C4) x       Lateral deltoid (C5) x       Tip of thumb (C6) x       Distal middle finger (C7) x       Distal fifth finger (C8) x       Medial forearm (T1)         Lower extremity               Reflexes Normal Abnormal Comments   C5-6 Biceps x       C5-6 Brachioradialis x       C7-8 Triceps x       Espinozas         S1-2 Seated achilles         S1-2 Prone knee bend         L3-4 Patellar tendon         Clonus         Babinski               CERV ROM       Cervical Flexion 15*     Cervical Extension 10*     Cervical SB 15* R, 8* L     Cervical rotation 45* R, 51* L             ROM Left Right   Shoulder Flex WNL WNL   Shoulder Abd WNL WNL   Shoulder ER WNL WNL   Shoulder IR WNL WNL                   Strength / Myotomes Left Right   Cervical Flexion (C1-2)       Cervical Side-bending (C3) X     Shoulder Shrug (C4) X     Shoulder Flex       Shoulder Scap       Shoulder Abduction (C5) X     Shoulder ER       Shoulder IR       Biceps (C6) X     Triceps (C7) X     Wrist Extension (C6) X     Wrist Flexion (C7) X             Thumb Abduction (C8)       Finger Abduction (T1)             Overall Response to Treatment:   [x]Patient is responding well to treatment and improvement is noted with regards  to goals   []Patient should continue to improve in reasonable time if they continue HEP   []Patient has plateaued and is no longer responding to skilled PT intervention    []Patient is getting worse and would benefit from return to referring MD   []Patient unable to adhere to initial POC   []Other:     Date range of Visits: 7  Total Visits: 10/8 to     Recommendation:    [x]Continue PT for 1 more visit, then likely discharge to [facility] as she awaits orders for eval/treat for her knee. []Hold PT, pending MD visit      Physical Therapy Daily Treatment Note      Patient Name:  Paolo Garcias    :  1950  MRN: [id number]  Medical/Treatment Diagnosis Information:  · Diagnosis: N43.334 (ICD-10-CM) - Spondylosis without myelopathy or radiculopathy, cervical region  · Treatment Diagnosis: Chronic neck pain, limited cervical ROM, decreased postural dysfunction  Insurance/Certification information:  PT Insurance Information: Medicare  Physician Information:  Referring Practitioner: JOÃO Nina  Plan of care signed (Y/N): [x]  Yes []  No     Date of Patient follow up with Physician:      Progress Report: []  Yes  [x]  No     Date Range for reporting period:  Beginning: 10/8/21  Endin21    Progress report due (10 Rx/or 30 days whichever is less): visit #76 or     Recertification due (POC duration/ or 90 days whichever is less): visit #    Visit # Insurance Allowable Auth required?  Date Range   -8 [place] []  Yes  [x]  No          Latex Allergy:  [x]NO      []YES  Preferred Language for Healthcare:   [x]English       []other:    Functional Scale:        Date assessed:  NDI: raw score = 34, 68% dysfunction   10/8   NDI: raw score = 28, 56% dysfunction    11/8     Pain level:  6/10     SUBJECTIVE:  States the neck hurts on the side, therapy helps, and continues to do all the exercises at home to maintain flexibility. Says her pain is a 5-6/10 when she wakes up, but 3-4/10 as the day goes on and with medication. Said the next step is injections in the neck which she is not interested in. Only wants to do therapy. 60% overall of normal. Limited with driving, and doesn't lift anything more than 10#. More head pain sometimes than neck pain.   She feels she wants to focus on getting a referral for her knee now and concentrate on that over the winter months               OBJECTIVE:   11/4 Pt 10 mins late for appt   10/21 Pt has increased tightness over upper traps bilaterally, forward head posture      RESTRICTIONS/PRECAUTIONS: history of C2 fracture      Exercises/Interventions:    Therapeutic Exercises (10460) Resistance / level Sets/sec Reps Notes   UBE X 2 mins fwd/bwd             Cervical AROM     UT stretch   30' X 2     Mid Rows 30# 2 10 Reviewed mechanics   High Rows Lime 2 10    LPD 25# 2 10    Thread the Needle   X 5 B    Chin Tucks on wall with ball   X 20    Chin Tucks with rotation   X 15 B    Prone Hughstons-- retractions/ extensions/ Habd/ scaption  All B   10/21   NO Money Lime 2 10x10/21   Wall slides with scap squeeze   10x 10/28                        Therapeutic Activities (36412)        TB dispensed for HEP and discussed value of protecting posture with adls and seated activities around her home    10/ 21          Time during session spent on edu pt on log roll technique to help alleviate symptoms       Re-cert, measurements 15'             Neuromuscular Re-ed (19505)                                                 Manual Intervention (37785)        Gentle cervical PROM with focus on lateral flexion bilaterally, UT/levator stretch, trigger point release, SOR, gentle distraction, Gentle cervical traction, STM with trigger point release in B UTs X 12 min                                Modalities: 11/4, Declined  10/28, 10/25: MHP to cervical spine in supine x 10 min    Pt. Education:  -patient educated on diagnosis, prognosis and expectations for rehab  -all patient questions were answered  10/25: Discussed with patient finding community classes that she can participate in. Also discussed with patient looking up places to get massage - PT suggested [place] [facility]:    Access Code: [email or web address]  URL: [email or web address]  Date: 10/08/2021  Prepared by: Sheron Anderson    Exercises  Seated Scapular Retraction - 2 x daily - 7 x weekly - 2-3 sets - 10 reps  Seated Assisted Cervical Rotation with Towel - 2 x daily - 7 x weekly - 2-3 sets - 10 reps  Standing Cervical Sidebending AROM - 2 x daily - 7 x weekly - 2-3 sets - 10 reps  Standing Cervical Flexion AROM - 2 x daily - 7 x weekly - 2-3 sets - 10 reps    Therapeutic Exercise and NMR EXR  [x] (65100) Provided verbal/tactile cueing for activities related to strengthening, flexibility, endurance, ROM for improvements in  [] LE / Lumbar: LE, proximal hip, and core control with self care, mobility, lifting, ambulation. [x] UE / Cervical: cervical, postural, scapular, scapulothoracic and UE control with self care, reaching, carrying, lifting, house/yardwork, driving, computer work.  [] (40175) Provided verbal/tactile cueing for activities related to improving balance, coordination, kinesthetic sense, posture, motor skill, proprioception to assist with   [] LE / lumbar: LE, proximal hip, and core control in self care, mobility, lifting, ambulation and eccentric single leg control.    [] UE / cervical: cervical, scapular, scapulothoracic and UE control with self care, reaching, carrying, lifting, house/yardwork, driving, computer work.   [] (93100) Therapist is in constant attendance of 2 or more patients providing skilled therapy interventions, but not providing any significant amount of measurable one-on-one time to either patient, for improvements in  [] LE / lumbar: LE, proximal hip, and core control in self care, mobility, lifting, ambulation and eccentric single leg control. [] UE / cervical: cervical, scapular, scapulothoracic and UE control with self care, reaching, carrying, lifting, house/yardwork, driving, computer work. NMR and Therapeutic Activities:    [] (70468 or 22527) Provided verbal/tactile cueing for activities related to improving balance, coordination, kinesthetic sense, posture, motor skill, proprioception and motor activation to allow for proper function of   [] LE: / Lumbar core, proximal hip and LE with self care and ADLs  [] UE / Cervical: cervical, postural, scapular, scapulothoracic and UE control with self care, carrying, lifting, driving, computer work.   [] (40065) Gait Re-education- Provided training and instruction to the patient for proper LE, core and proximal hip recruitment and positioning and eccentric body weight control with ambulation re-education including up and down stairs     Home Management Training / Self Care:  [] (85963) Provided self-care/home management training related to activities of daily living and compensatory training, and/or use of adaptive equipment for improvement with: ADLs and compensatory training, meal preparation, safety procedures and instruction in use of adaptive equipment, including bathing, grooming, dressing, personal hygiene, basic household cleaning and chores.      Home Exercise Program:    [x] (85007) Reviewed/Progressed HEP activities related to strengthening, flexibility, endurance, ROM of   [] LE / Lumbar: core, proximal hip and LE for functional self-care, mobility, lifting and ambulation/stair navigation   [] UE / Cervical: cervical, postural, scapular, scapulothoracic and UE control with self care, reaching, carrying, lifting, house/yardwork, driving, computer work  [] (21526)Reviewed/Progressed HEP activities related to improving balance, coordination, kinesthetic sense, posture, motor skill, proprioception of   [] LE: core, proximal hip and LE for self care, mobility, lifting, and ambulation/stair navigation    [] UE / Cervical: cervical, postural,  scapular, scapulothoracic and UE control with self care, reaching, carrying, lifting, house/yardwork, driving, computer work    Manual Treatments:  PROM / STM / Oscillations-Mobs:  G-I, II, III, IV (PA's, Inf., Post.)  [] ([id number]) Provided manual therapy to mobilize LE, proximal hip and/or LS spine soft tissue/joints for the purpose of modulating pain, promoting relaxation,  increasing ROM, reducing/eliminating soft tissue swelling/inflammation/restriction, improving soft tissue extensibility and allowing for proper ROM for normal function with   [] LE / lumbar: self care, mobility, lifting and ambulation. [] UE / Cervical: self care, reaching, carrying, lifting, house/yardwork, driving, computer work. Modalities:  [] (69809) Vasopneumatic compression: Utilized vasopneumatic compression to decrease edema / swelling for the purpose of improving mobility and quad tone / recruitment which will allow for increased overall function including but not limited to self-care, transfers, ambulation, and ascending / descending stairs.      Charges:  Timed Code Treatment Minutes: 42   Total Treatment Minutes: 42     [] EVAL - LOW (07373)   [] EVAL - MOD (89306)  [] EVAL - HIGH (64900)  [] RE-EVAL (91320)  [x] TN(77443) x 1     [] Ionto  [] NMR (11918) x       [] Vaso  [x] Manual (27527) x  1      [] Ultrasound  [x] TA x  1     [] Mech Traction (76210)  [] Aquatic Therapy x     [] ES (un) (78415):   [] Home Management Training x  [] ES(attended) (69909)   [] Dry Needling 1-2 muscles (23619):  [] Dry Needling 3+ muscles (669046)  [] Group:      [] Other:     GOALS:     Patient stated goal: relax nerves/muscles in neck. []? Progressing: []? Met: []? Not Met: []? Adjusted      Therapist goals for Patient: relax nerves/muscles in neck  Short Term Goals: To be achieved in: 2 weeks  1. Independent in HEP and progression per patient tolerance, in order to prevent re-injury. [x]? Progressing: []? Met: []? Not Met: []? Adjusted  2. Patient will have a decrease in pain to facilitate improvement in movement, function, and ADLs as indicated by Functional Deficits. [x]? Progressing: []? Met: []? Not Met: []? Adjusted     Long Term Goals: To be achieved in: 4 weeks or discharge   1. Disability index score of 40% or less for the NDI to assist with reaching prior level of function. [x]? Progressing: []? Met: []? Not Met: []? Adjusted  2. Patient will demonstrate increased AROM cervical rotation by 15* of cervical/thoracic spine to allow for proper joint functioning as indicated by patients Functional Deficits. [x]? Progressing: []? Met: []? Not Met: []? Adjusted  3. Patient will demonstrate an increase in postural awareness and control and activation of  Deep cervical stabilizers to allow for proper functional mobility as indicated by patients Functional Deficits. []? Progressing: [x]? Met: []? Not Met: []? Adjusted  4. Patient will return to functional activities including turning head for improved driving without increased symptoms or restriction. []? Progressing: []? Met: []? Not Met: []? Adjusted         Overall Progression Towards Functional goals/ Treatment Progress Update:  [x] Patient is progressing as expected towards functional goals listed. [] Progression is slowed due to complexities/Impairments listed. [] Progression has been slowed due to co-morbidities.   [] Plan just implemented, too soon to assess goals progression <30days   [] Goals require adjustment due to lack of progress  [] Patient is not progressing as expected and requires additional follow up with physician  [] Other    Persisting Functional Limitations/Impairments:  [x]Sleeping [x]Sitting               []Standing [x]Transfers        [x]Walking []Kneeling               []Stairs []Squatting / bending   [x]ADLs []Reaching  []Lifting  [x]Housework  [x]Driving [x]Job related tasks  []Sports/Recreation []Other:        ASSESSMENT:   Pt has received 7 skilled PT sessions. Pt is responding well and is consistent and compliant with a HEP. She has good and bad days, and manages her symptoms well. She still struggles with cervical rotation to allow her more consistent ROM for safe driving. Pt able to tolerate exercises this session and no complaints afterwards. She has tenderness and trigger points over upper trap. May be a candidate for dry needling. In-basketball message sent for approval to the ordering physician, but patient appears to now desire discharge to HEP/maintenance program so she can focus on her knee related deficits. She is responding well to interventions and HEP. Continue to progress. Continue to work on cervical ROM and manual techniques to reduce muscular tightness. Treatment/Activity Tolerance:  [x] Patient able to complete tx [] Patient limited by fatigue  [] Patient limited by pain  [] Patient limited by other medical complications  [] Other:     Prognosis: [x] Good [] Fair  [] Poor    Patient Requires Follow-up: [x] Yes  [] No    Plan for next treatment session:    PLAN:  PT 1-2x / week for 4 weeks. [x] Continue per plan of care [] Alter current plan (see comments)  [] Plan of care initiated [] Hold pending MD visit [] Discharge   Electronically signed by: Jennifer Elise PT, PT    Note: If patient does not return for scheduled/ recommended follow up visits, this note will serve as a discharge from care along with most recent update on progress.

## 2021-11-15 ENCOUNTER — HOSPITAL ENCOUNTER (OUTPATIENT)
Dept: PHYSICAL THERAPY | Age: 71
Setting detail: THERAPIES SERIES
Discharge: HOME | End: 2021-11-15
Payer: MEDICARE

## 2021-11-15 PROCEDURE — 97112 NEUROMUSCULAR REEDUCATION: CPT

## 2021-11-15 PROCEDURE — 97530 THERAPEUTIC ACTIVITIES: CPT

## 2021-11-15 PROCEDURE — 97110 THERAPEUTIC EXERCISES: CPT

## 2021-11-15 NOTE — FLOWSHEET NOTE
[facility]  Phone: [phone]   Fax: [phone]    Physical Therapy Discharge Summary     Dear Sharyle Currier, APRN-CNP    We had the pleasure of treating the following patient for physical therapy services at [facility]. A summary of our findings can be found in the updated assessment below. This includes our plan of care. If you have any questions or concerns regarding these findings, please do not hesitate to contact me at the office phone number checked above.   Thank you for the referral.     Physician Signature:________________________________Date:__________________  By signing above (or electronic signature), therapist's plan is approved by physician      Functional Outcome:   NDI:  28, 56%     OBJECTIVE:     Bold denotes improvement       Dermatomes Normal Abnormal Comments   Top of head (C1) x       Posterior occipital region (C2) x       Side of neck (C3) x       Top of shoulder (C4) x       Lateral deltoid (C5) x       Tip of thumb (C6) x       Distal middle finger (C7) x       Distal fifth finger (C8) x       Medial forearm (T1)         Lower extremity               Reflexes Normal Abnormal Comments   C5-6 Biceps x       C5-6 Brachioradialis x       C7-8 Triceps x       Espinozas         S1-2 Seated achilles         S1-2 Prone knee bend         L3-4 Patellar tendon         Clonus         Babinski               CERV ROM       Cervical Flexion 15*     Cervical Extension 10*     Cervical SB 15* R, 8* L     Cervical rotation 45* R, 51* L             ROM Left Right   Shoulder Flex WNL WNL   Shoulder Abd WNL WNL   Shoulder ER WNL WNL   Shoulder IR WNL WNL                   Strength / Myotomes Left Right   Cervical Flexion (C1-2)       Cervical Side-bending (C3) X     Shoulder Shrug (C4) X     Shoulder Flex       Shoulder Scap       Shoulder Abduction (C5) X     Shoulder ER       Shoulder IR     Biceps (C6) X     Triceps (C7) X     Wrist Extension (C6) X     Wrist Flexion (C7) X             Thumb Abduction (C8)       Finger Abduction (T1)             Overall Response to Treatment:   [x]Patient is responding well to treatment and improvement is noted with regards  to goals   []Patient should continue to improve in reasonable time if they continue HEP   []Patient has plateaued and is no longer responding to skilled PT intervention    []Patient is getting worse and would benefit from return to referring MD   []Patient unable to adhere to initial POC   []Other:      Date range of Visits: 8  Total Visits: 10/8 to 11/15    Recommendation:    [x]Discharge from skilled Physical therapy at this time. 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 (ICD-10-CM) - Spondylosis without myelopathy or radiculopathy, cervical region  · Treatment Diagnosis: Chronic neck pain, limited cervical ROM, decreased postural dysfunction  Insurance/Certification information:  PT Insurance Information: Medicare  Physician Information:  Referring Practitioner: JOÃO Wolfe  Plan of care signed (Y/N): [x]  Yes []  No     Date of Patient follow up with Physician:      Progress Report: []  Yes  [x]  No     Date Range for reporting period:  Beginning: 10/8/21  Endin21    Progress report due (10 Rx/or 30 days whichever is less): visit #10 or 49/4    Recertification due (POC duration/ or 90 days whichever is less): visit #    Visit # Insurance Allowable Auth required?  Date Range   -8 [place] []  Yes  [x]  No          Latex Allergy:  [x]NO      []YES  Preferred Language for Healthcare:   [x]English       []other:    Functional Scale:        Date assessed:  NDI: raw score = 34, 68% dysfunction   10/8   NDI: raw score = 28, 56% dysfunction    11/8     Pain level:  6/10     SUBJECTIVE:  States the neck hurts on the side, therapy helps, and continues to do all the exercises at home to maintain flexibility. Says her pain is a 5-6/10 when she wakes up, but 3-4/10 as the day goes on and with medication. Said the next step is injections in the neck which she is not interested in. Only wants to do therapy. 60% overall of normal. Limited with driving, and doesn't lift anything more than 10#. More head pain sometimes than neck pain.   She feels she wants to focus on getting a referral for her knee now and concentrate on that over the winter months               OBJECTIVE:   11/4 Pt 10 mins late for appt   10/21 Pt has increased tightness over upper traps bilaterally, forward head posture      RESTRICTIONS/PRECAUTIONS: history of C2 fracture      Exercises/Interventions:    Therapeutic Exercises (40719) Resistance / level Sets/sec Reps Notes   UBE X 2 mins fwd/bwd             Cervical AROM     UT stretch   30' X 2     Mid Rows 30# 2 10 Reviewed mechanics   High Rows Lime 2 10    LPD 25# 2 10    Thread the Needle   X 5 B    Chin Tucks on wall with ball   X 20    Chin Tucks with rotation   X 15 B    Prone Hughstons-- retractions/ extensions/ Habd/ scaption  All B   10/21   NO Money Lime 2 10x10/21   Wall slides with scap squeeze   10x 10/28                        Therapeutic Activities (22606)        TB dispensed for HEP and discussed value of protecting posture with adls and seated activities around her home    10/ 21          Time during session spent on edu pt on log roll technique to help alleviate symptoms       Re-cert, Pn, discharge discussion, measurements 15'             Neuromuscular Re-ed (12544)                                                 Manual Intervention ([id number])        Gentle cervical PROM with focus on lateral flexion bilaterally, UT/levator stretch, trigger point release, SOR, gentle distraction,               Gentle cervical traction, STM with trigger point release in B UTs X 15 min                                Modalities: 11/4, Declined  10/28, 10/25: MHP to cervical spine in supine x 10 min    Pt. Education:  -patient educated on diagnosis, prognosis and expectations for rehab  -all patient questions were answered  10/25: Discussed with patient finding community classes that she can participate in. Also discussed with patient looking up places to get massage - PT suggested [facility]:    Access Code: [email or web address]  URL: [email or web address]  Date: 10/08/2021  Prepared by: Nikos Savage    Exercises  Seated Scapular Retraction - 2 x daily - 7 x weekly - 2-3 sets - 10 reps  Seated Assisted Cervical Rotation with Towel - 2 x daily - 7 x weekly - 2-3 sets - 10 reps  Standing Cervical Sidebending AROM - 2 x daily - 7 x weekly - 2-3 sets - 10 reps  Standing Cervical Flexion AROM - 2 x daily - 7 x weekly - 2-3 sets - 10 reps    Therapeutic Exercise and NMR EXR  [x] (57490) Provided verbal/tactile cueing for activities related to strengthening, flexibility, endurance, ROM for improvements in  [] LE / Lumbar: LE, proximal hip, and core control with self care, mobility, lifting, ambulation. [x] UE / Cervical: cervical, postural, scapular, scapulothoracic and UE control with self care, reaching, carrying, lifting, house/yardwork, driving, computer work.  [] (25674) Provided verbal/tactile cueing for activities related to improving balance, coordination, kinesthetic sense, posture, motor skill, proprioception to assist with   [] LE / lumbar: LE, proximal hip, and core control in self care, mobility, lifting, ambulation and eccentric single leg control.    [] UE / cervical: cervical, scapular, scapulothoracic and UE control with self care, reaching, carrying, lifting, house/yardwork, driving, computer work.   [] (83076) Therapist is in constant attendance of 2 or more patients providing skilled therapy interventions, but not providing any significant amount of measurable one-on-one time to either patient, for improvements in  [] LE / lumbar: LE, proximal hip, and core control in self care, mobility, lifting, ambulation and eccentric single leg control. [] UE / cervical: cervical, scapular, scapulothoracic and UE control with self care, reaching, carrying, lifting, house/yardwork, driving, computer work. NMR and Therapeutic Activities:    [] (81239 or 38290) Provided verbal/tactile cueing for activities related to improving balance, coordination, kinesthetic sense, posture, motor skill, proprioception and motor activation to allow for proper function of   [] LE: / Lumbar core, proximal hip and LE with self care and ADLs  [] UE / Cervical: cervical, postural, scapular, scapulothoracic and UE control with self care, carrying, lifting, driving, computer work.   [] (07463) Gait Re-education- Provided training and instruction to the patient for proper LE, core and proximal hip recruitment and positioning and eccentric body weight control with ambulation re-education including up and down stairs     Home Management Training / Self Care:  [] (97554) Provided self-care/home management training related to activities of daily living and compensatory training, and/or use of adaptive equipment for improvement with: ADLs and compensatory training, meal preparation, safety procedures and instruction in use of adaptive equipment, including bathing, grooming, dressing, personal hygiene, basic household cleaning and chores.      Home Exercise Program:    [x] (16336) Reviewed/Progressed HEP activities related to strengthening, flexibility, endurance, ROM of   [] LE / Lumbar: core, proximal hip and LE for functional self-care, mobility, lifting and ambulation/stair navigation   [] UE / Cervical: cervical, postural, scapular, scapulothoracic and UE control with self care, reaching, carrying, lifting, house/yardwork, driving, computer work  [] (43465)Reviewed/Progressed HEP activities related to improving balance, coordination, kinesthetic sense, posture, motor skill, proprioception of   [] LE: core, proximal hip and LE for self care, mobility, lifting, and ambulation/stair navigation    [] UE / Cervical: cervical, postural,  scapular, scapulothoracic and UE control with self care, reaching, carrying, lifting, house/yardwork, driving, computer work    Manual Treatments:  PROM / STM / Oscillations-Mobs:  G-I, II, III, IV (PA's, Inf., Post.)  [] (75115) Provided manual therapy to mobilize LE, proximal hip and/or LS spine soft tissue/joints for the purpose of modulating pain, promoting relaxation,  increasing ROM, reducing/eliminating soft tissue swelling/inflammation/restriction, improving soft tissue extensibility and allowing for proper ROM for normal function with   [] LE / lumbar: self care, mobility, lifting and ambulation. [] UE / Cervical: self care, reaching, carrying, lifting, house/yardwork, driving, computer work. Modalities:  [] (26476) Vasopneumatic compression: Utilized vasopneumatic compression to decrease edema / swelling for the purpose of improving mobility and quad tone / recruitment which will allow for increased overall function including but not limited to self-care, transfers, ambulation, and ascending / descending stairs.      Charges:  Timed Code Treatment Minutes: 40   Total Treatment Minutes: 40     [] EVAL - LOW (60467)   [] EVAL - MOD (22027)  [] EVAL - HIGH (94662)  [] RE-EVAL (73714)  [x] EL(07242) x 1     [] Ionto  [] NMR (56705) x       [] Vaso  [x] Manual (88284) x  1      [] Ultrasound  [x] TA x  1     [] Mech Traction (23193)  [] Aquatic Therapy x      [] ES (un) (09275):   [] Home Management Training x  [] ES(attended) (72493)   [] Dry Needling 1-2 muscles (16612):  [] Dry Needling 3+ muscles (115581)  [] Group:      [] Other:     GOALS:     Patient stated goal: relax nerves/muscles in neck. []? Progressing: []? Met: []? Not Met: []? Adjusted      Therapist goals for Patient: relax nerves/muscles in neck  Short Term Goals: To be achieved in: 2 weeks  1. Independent in HEP and progression per patient tolerance, in order to prevent re-injury. [x]? Progressing: []? Met: []? Not Met: []? Adjusted  2. Patient will have a decrease in pain to facilitate improvement in movement, function, and ADLs as indicated by Functional Deficits. [x]? Progressing: []? Met: []? Not Met: []? Adjusted     Long Term Goals: To be achieved in: 4 weeks or discharge   1. Disability index score of 40% or less for the NDI to assist with reaching prior level of function. [x]? Progressing: []? Met: []? Not Met: []? Adjusted  2. Patient will demonstrate increased AROM cervical rotation by 15* of cervical/thoracic spine to allow for proper joint functioning as indicated by patients Functional Deficits. [x]? Progressing: []? Met: []? Not Met: []? Adjusted  3. Patient will demonstrate an increase in postural awareness and control and activation of  Deep cervical stabilizers to allow for proper functional mobility as indicated by patients Functional Deficits. []? Progressing: [x]? Met: []? Not Met: []? Adjusted  4. Patient will return to functional activities including turning head for improved driving without increased symptoms or restriction. [x]? Progressing: []? Met: []? Not Met: []? Adjusted         Overall Progression Towards Functional goals/ Treatment Progress Update:  [x] Patient is progressing as expected towards functional goals listed. [] Progression is slowed due to complexities/Impairments listed. [] Progression has been slowed due to co-morbidities.   [] Plan just implemented, too soon to assess goals progression <30days   [] Goals require adjustment due to lack of progress  [] Patient is not progressing as expected and requires additional follow up with physician  [] Other    Persisting Functional Limitations/Impairments:  [x]Sleeping [x]Sitting               []Standing [x]Transfers        [x]Walking []Kneeling               []Stairs []Squatting / bending   [x]ADLs []Reaching  []Lifting  [x]Housework  [x]Driving [x]Job related tasks  []Sports/Recreation []Other:        ASSESSMENT:   Pt has received 8 skilled PT sessions. Pt is responding well and is consistent and compliant with a HEP. She has good and bad days, and manages her symptoms well. She still struggles with cervical rotation to allow her more consistent ROM for safe driving. Pt able to tolerate exercises this session and no complaints afterwards. She has tenderness and trigger points over upper trap. In-basket message sent for approval to the ordering physician, and was approved, but patient now desires discharge to HEP/maintenance program so she can focus on her knee related deficits. Will discharge with HEP and will f/u prn. Treatment/Activity Tolerance:  [x] Patient able to complete tx [] Patient limited by fatigue  [] Patient limited by pain  [] Patient limited by other medical complications  [] Other:     Prognosis: [x] Good [] Fair  [] Poor    Patient Requires Follow-up: [x] Yes  [] No    Plan for next treatment session:    PLAN:  PT 1-2x / week for 4 weeks. [] Continue per plan of care [] Alter current plan (see comments)  [] Plan of care initiated [] Hold pending MD visit [x] Discharge      Electronically signed by: Marvel Wang PT, PT    Note: If patient does not return for scheduled/ recommended follow up visits, this note will serve as a discharge from care along with most recent update on progress.

## 2021-11-24 ENCOUNTER — HOSPITAL ENCOUNTER (OUTPATIENT)
Dept: PHYSICAL THERAPY | Age: 71
Setting detail: THERAPIES SERIES
Discharge: HOME | End: 2021-11-24
Payer: MEDICARE

## 2021-11-24 PROCEDURE — 97161 PT EVAL LOW COMPLEX 20 MIN: CPT

## 2021-11-24 PROCEDURE — 97110 THERAPEUTIC EXERCISES: CPT

## 2021-11-24 PROCEDURE — 97530 THERAPEUTIC ACTIVITIES: CPT

## 2021-11-24 NOTE — PLAN OF CARE
[place], [facility], [address]  Phone: [phone]   Fax: [phone]                                                       Physical Therapy Certification    Dear Referring Practitioner: Cori Damon,    We had the pleasure of evaluating the following patient for physical therapy services at [address] [place] [address]. A summary of our findings can be found in the initial assessment below. This includes our plan of care. If you have any questions or concerns regarding these findings, please do not hesitate to contact me at the office phone number checked above. Thank you for the referral.       Physician Signature:_______________________________Date:__________________  By signing above (or electronic signature), therapists plan is approved by physician      Patient: Arlen Hernández   : 1950   MRN: [id number]  Referring Physician: Referring Practitioner: Cori Damon      Evaluation Date: 2021      Medical Diagnosis Information:  Diagnosis: Chronic pain of left knee M25.562, G89.29   Treatment Diagnosis: Decreased standing/ambulation tolerance, difficulty with stairs, squatting, decreased functional activity tolerance                                         Insurance information: PT Insurance Information: Medicare     Precautions/ Contra-indications:   Latex Allergy:  [x]NO      []YES  Preferred Language for Healthcare:   [x]English       []Other:    C-SSRS Triggered by Intake questionnaire (Past 2 wk assessment ):   [x] No, Questionnaire did not trigger screening.   [] Yes, Patient intake triggered C-SSRS Screening     [] Completed, no further action required. [] Completed, PCP notified via Epic    SUBJECTIVE: Patient stated complaint: Pt requested referral from her PCP for ongoing chronic left knee pain.   She had an appointment set up with Dr. Arvin Avilez, orthopedist, today, but cancelled it. She previously had seen physicians at [facility]. Was recently discharged from PT for neck dysfunction, because she wanted to now concentrate on her knee. Left knee pain has been going on for approximately 8 years. Was told she had arthritis. Had PT for knee and injections in the past.  PT helped along with HEP. Minimal relief with injections, but had to go to ER because MD probably hit a nerve. Has to go up/down steps sideways. Has a cane, but only uses it when it really bothers her. Haven't went out as much the last few years. No recent history of falls. Relevant Medical History: Neck pain, eye surgery last week   Functional Outcome: LEFS: raw score = 33 ; dysfunction = 40-59 %    Pain Scale: 6-7/10 left knee   Easing factors:  Sitting/resting, ice/heat  Provocative factors:  Steps, cold weather, squatting, kneeling prolonged walking    Type: [x]Constant with weather change  [x]Intermittent rest of season []Radiating []Localized []other:     Numbness/Tingling:      Occupation/School:  Retired     Living Status/Prior Level of Function:Prior to this injury / incident, pt was independent with ADLs and IADLs,  Lives in a ranch home, 12 steps to basement for laundry, laundry chute.        OBJECTIVE:   Palpation:     Functional Mobility/Transfers:     Posture:     Bandages/Dressings/Incisions:     Gait: (include devices/WB status)     Dermatomes Normal Abnormal Comments   inguinal area (L1)  x     anterior mid-thigh (L2) x     distal ant thigh/med knee (L3) x     medial lower leg and foot (L4) x     lateral lower leg and foot (L5) x     posterior calf (S1)      medial calcaneus (S2)          Reflexes Normal Abnormal Comments   S1-2 Seated achilles      S1-2 Prone knee bend      L3-4 Patellar tendon x     Clonus      Babinski        Lumbar AROM screen: [] WFL  [] abnormal:     PROM AROM    L R L R   Hip Flexion       Hip Abduction       Hip ER Hip IR       Knee Flexion   115 125   Knee Extension   0 0   Dorsiflexion        Plantarflexion        Inversion        Eversion            Strength (0-5) / Myotomes Left Right   Hip Flexion - supine     Hip Flexion - seated (L1-2) 4 5   Hip Abduction 4+ 4+   Hip Adduction     Hip ER 4+ 4+   Hip IR 4+ 4+   Quads (L2-4) 4 5   Hamstrings 4+ 5   Ankle Dorsiflexion (L4-5) 5 5   Ankle Plantarflexion (S1-2)     Ankle Inversion     Ankle Eversion (S1-2)     Great Toe Extension (L5)          Flexibility     Hamstrings (90/90) Lacking 3* Lacking 3*   ITB Berkley Fellers)     Quads (Ely's) Pain  WFL   Hip Flexor Shyla Rinne)          Girth     Mid patella     Suprapatellar     Figure 8     Transmalleolar     Metatarsal Heads         Joint mobility:    []Normal    []Hypo   []Hyper    Orthopaedic Special Tests  Positive  Negative  NT Comments    Hip       OTILIA / Ortiz's       FADIR       Scour       Trendelenburg              Knee       Lachman's / Anterior Drawer       Posterior Drawer       Varus Stress       Valgus Stress       Lenard's        Appley's       Thessaly's       Patellar Tracking              Ankle       Anterior Drawer       Talar Tilt       Schmitt       Og's                   Balance: Tandem stance 30 secs B;   SLS: 3 secs B                         [x] Patient history, allergies, meds reviewed. Medical chart reviewed. See intake form. Review Of Systems (ROS):  [x]Performed Review of systems (Integumentary, CardioPulmonary, Neurological) by intake and observation. Intake form has been scanned into medical record. Patient has been instructed to contact their primary care physician regarding ROS issues if not already being addressed at this time.       Co-morbidities/Complexities (which will affect course of rehabilitation):   []None        []Hx of COVID   Arthritic conditions   []Rheumatoid arthritis (M05.9)  []Osteoarthritis (M19.91)  []Gout   Cardiovascular conditions   []Hypertension (I10)  []Hyperlipidemia (E78.5)  []Angina pectoris (I20)  []Atherosclerosis (I70)  []Pacemaker  []Hx of CABG/stent/  cardiac surgeries   Musculoskeletal conditions   []Disc pathology   []Congenital spine pathologies   []Osteoporosis (M81.8)  []Osteopenia (M85.8)  []Scoliosis       Endocrine conditions   []Hypothyroid (E03.9)  []Hyperthyroid Gastrointestinal conditions   []Constipation (L63.65)   Metabolic conditions   []Morbid obesity (E66.01)  []Diabetes type 1(E10.65) or 2 (E11.65)   []Neuropathy (G60.9)     Cardio/Pulmonary conditions   []Asthma (J45)  []Coughing   []COPD (J44.9)  []CHF  []A-fib   Psychological Disorders  []Anxiety (F41.9)  []Depression (F32.9)   []Other:   Developmental Disorders  []Autism (F84.0)  []CP (G80)  []Down Syndrome (Q90.9)  []Developmental delay     Neurological conditions  []Prior Stroke (I69.30)  []Parkinson's (G20)  []Encephalopathy (G93.40)  []MS (G35)  []Post-polio (G14)  []SCI  []TBI  []ALS Other conditions  []Fibromyalgia (M79.7)  []Vertigo  []Syncope  []Kidney Failure  []Cancer      []currently undergoing                treatment  []Pregnancy  []Incontinence   Prior surgeries  []involved limb  []previous spinal surgery  [] section birth  []hysterectomy  []bowel / bladder surgery  []other relevant surgeries   []Other:              Barriers to/and or personal factors that will affect rehab potential:              [x]Age  []Sex    []Smoker              []Motivation/Lack of Motivation                        []Co-Morbidities              []Cognitive Function, education/learning barriers              []Environmental, home barriers              []profession/work barriers  []past PT/medical experience  []other:  Justification:     Falls Risk Assessment (30 days):   [x] Falls Risk assessed and no intervention required.   [] Falls Risk assessed and Patient requires intervention due to being higher risk   TUG score (>12s at risk):      [] Falls education provided, including         ASSESSMENT: Functional Impairments:     []Noted lumbar/proximal hip/LE joint hypomobility   [x]Decreased LE functional ROM   []Decreased core/proximal hip strength and neuromuscular control   [x]Decreased LE functional strength   []Reduced balance/proprioceptive control   []other:      Functional Activity Limitations (from functional questionnaire and intake)   []Reduced ability to tolerate prolonged functional positions   []Reduced ability or difficulty with changes of positions or transfers between positions   []Reduced ability to maintain good posture and demonstrate good body mechanics with sitting, bending, and lifting   [x]Reduced ability to sleep   [] Reduced ability or tolerance with driving and/or computer work   []Reduced ability to perform lifting, carrying tasks   [x]Reduced ability to squat    []Reduced ability to forward bend   [x]Reduced ability to ambulate prolonged functional periods/distances/surfaces   [x]Reduced ability to ascend/descend stairs   []Reduced ability to run, hop, cut or jump   []other:    Participation Restrictions   []Reduced participation in self care activities   []Reduced participation in home management activities   []Reduced participation in work activities   []Reduced participation in social activities. []Reduced participation in sport/recreation activities. Classification :    []Signs/symptoms consistent with post-surgical status including decreased ROM, strength and function.    []Signs/symptoms consistent with joint sprain/strain   []Signs/symptoms consistent with patella-femoral syndrome   [x]Signs/symptoms consistent with knee OA/hip OA   []Signs/symptoms consistent with internal derangement of knee/Hip   []Signs/symptoms consistent with functional hip weakness/NMR control      []Signs/symptoms consistent with tendinitis/tendinosis    []signs/symptoms consistent with pathology which may benefit from Dry needling      []other:      Prognosis/Rehab Potential: []Excellent   [x]Good    []Fair   []Poor    Tolerance of evaluation/treatment:    []Excellent   [x]Good    []Fair   []Poor    Physical Therapy Evaluation Complexity Justification  [x] A history of present problem with:  [x] no personal factors and/or comorbidities that impact the plan of care;  []1-2 personal factors and/or comorbidities that impact the plan of care  []3 personal factors and/or comorbidities that impact the plan of care  [x] An examination of body systems using standardized tests and measures addressing any of the following: body structures and functions (impairments), activity limitations, and/or participation restrictions;:  [x] a total of 1-2 or more elements   [] a total of 3 or more elements   [] a total of 4 or more elements   [x] A clinical presentation with:  [x] stable and/or uncomplicated characteristics   [] evolving clinical presentation with changing characteristics  [] unstable and unpredictable characteristics;   [x] Clinical decision making of [x] Low, [] moderate, [] high complexity using standardized patient assessment instrument and/or measurable assessment of functional outcome. [x] EVAL (LOW) 19388 (typically 15 minutes face-to-face)  [] EVAL (MOD) 03038 (typically 30 minutes face-to-face)  [] EVAL (HIGH) 16126 (typically 45 minutes face-to-face)  [] RE-EVAL     PLAN:   Frequency/Duration:   1-2 days per week for 6 Weeks:  Interventions:  [x]  Therapeutic exercise including: strength training, ROM, for Lower extremity and core   [x]  NMR activation and proprioception for LE, Glutes and Core   [x]  Manual therapy as indicated for LE, Hip and spine to include: Dry Needling/IASTM, STM, PROM, Gr I-IV mobilizations, manipulation. [x] Modalities as needed that may include: thermal agents, E-stim, Biofeedback, US, iontophoresis as indicated  [x] Patient education on joint protection, postural re-education, activity modification, progression of HEP.     HEP instruction: Written HEP instructions provided and reviewed. Access Code: [email or web address]  URL: [email or web address] [email or web address]  Date: 11/24/2021  Prepared by: Wilfredo Alarcon    Exercises  Supine Quad Set - 2 x daily - 7 x weekly - 1-2 sets - 10 reps - 5 secs hold  Supine Active Straight Leg Raise - 2 x daily - 7 x weekly - 2-3 sets - 10 reps  Supine Heel Slide - 2 x daily - 7 x weekly - 2-3 sets - 10 reps  Clamshell with Resistance - 2 x daily - 7 x weekly - 2-3 sets - 10 reps  Side Stepping with Resistance at Ankles - 2 x daily - 7 x weekly - 2-3 sets - 10 reps  Standing Hip Abduction with Resistance at Ankles and Counter Support - 2 x daily - 7 x weekly - 2-3 sets - 10 reps    GOALS:  Patient stated goal: learn new exercises to maintain, help with pain relief, go up/down steps better, walk without feeling of instability and not use a cane at all. [] Progressing: [] Met: [] Not Met: [] Adjusted    Therapist goals for Patient:   Short Term Goals: To be achieved in: 2 weeks  1. Independent in HEP and progression per patient tolerance, in order to prevent re-injury. [] Progressing: [] Met: [] Not Met: [] Adjusted  2. Patient will have a decrease in pain to facilitate improvement in movement, function, and ADLs as indicated by Functional Deficits. [] Progressing: [] Met: [] Not Met: [] Adjusted    Long Term Goals: To be achieved in:  weeks  1. Disability index score of 20% or less for the LEFS to assist with reaching prior level of function. [] Progressing: [] Met: [] Not Met: [] Adjusted  2. Patient will demonstrate increased AROM to 120* left knee flexion to allow for proper joint functioning as indicated by patients Functional Deficits. [] Progressing: [] Met: [] Not Met: [] Adjusted  3. Patient will demonstrate an increase in Strength to at least  4+/5 as well as good proximal hip strength and control to allow for proper functional mobility as indicated by patients Functional Deficits.    [] Progressing: [] Met: [] Not Met: [] Adjusted  4. Patient will return to functional activities including  Ascending/descending steps in a step-to pattern without increased symptoms or restriction.    [] Progressing: [] Met: [] Not Met: [] Adjusted      Electronically signed by:  Michael Her PT

## 2021-11-24 NOTE — FLOWSHEET NOTE
[facility] Physical Therapy  Phone: [phone]   Fax: [phone]    Physical Therapy Daily Treatment Note  Date:  2021    Patient Name:  Laila Villatoro    :  1950  MRN: [id number]  Medical/Treatment Diagnosis Information:  · Diagnosis: Chronic pain of left knee M25.562, G89.29  · Treatment Diagnosis: Decreased standing/ambulation tolerance, difficulty with stairs, squatting, decreased functional activity tolerance  Insurance/Certification information:  PT Insurance Information: Medicare  Physician Information:  Referring Practitioner: See Skelton  Plan of care signed (Y/N): []  Yes [x]  No     Date of Patient follow up with Physician:      Progress Report: []  Yes  [x]  No     Date Range for reporting period:  Beginnin/24   Ending:     Progress report due (10 Rx/or 30 days whichever is less): visit #59 or /    Recertification due (POC duration/ or 90 days whichever is less): visit #    Visit # Insurance Allowable Auth required?  Date Range    [place] []  Yes  []  No            Latex Allergy:  [x]NO      []YES  Preferred Language for Healthcare:   [x]English       []other:    Functional Scale:        Date assessed:  LEFS: raw score = 33/80; dysfunction =%       Pain level:  6-7/10 left knee     SUBJECTIVE:  See eval    OBJECTIVE: See eval      RESTRICTIONS/PRECAUTIONS:     Exercises/Interventions:     Therapeutic Exercises (07524) Resistance / level Sets/sec Reps Notes   Nustep              IB, HR/TR       HS/HF Stretch              Step Ups Fwd       Step Ups Lat              SLR Flex       Clamshells       Quad Set                     Leg Press                     Therapeutic Activities (06932)              Mini Squats       Lateral Band Walk                     Neuromuscular Re-ed (27681)              3-way hip              Balance       Shuttle              Manual Intervention (22221) Modalities:     Pt. Education:  -patient educated on diagnosis, prognosis and expectations for rehab  -all patient questions were answered    Home Exercise Program:      Access Code: [email or web address]  URL: [email or web address]  Date: 11/24/2021  Prepared by: Jean-Pierre Murillo     Exercises  Supine Quad Set - 2 x daily - 7 x weekly - 1-2 sets - 10 reps - 5 secs hold  Supine Active Straight Leg Raise - 2 x daily - 7 x weekly - 2-3 sets - 10 reps  Supine Heel Slide - 2 x daily - 7 x weekly - 2-3 sets - 10 reps  Clamshell with Resistance - 2 x daily - 7 x weekly - 2-3 sets - 10 reps  Side Stepping with Resistance at Ankles - 2 x daily - 7 x weekly - 2-3 sets - 10 reps  Standing Hip Abduction with Resistance at Ankles and Counter Support - 2 x daily - 7 x weekly - 2-3 sets - 10 reps    Therapeutic Exercise and NMR EXR  [] (00878) Provided verbal/tactile cueing for activities related to strengthening, flexibility, endurance, ROM for improvements in  [] LE / Lumbar: LE, proximal hip, and core control with self care, mobility, lifting, ambulation. [] UE / Cervical: cervical, postural, scapular, scapulothoracic and UE control with self care, reaching, carrying, lifting, house/yardwork, driving, computer work.  [] (39250) Provided verbal/tactile cueing for activities related to improving balance, coordination, kinesthetic sense, posture, motor skill, proprioception to assist with   [] LE / lumbar: LE, proximal hip, and core control in self care, mobility, lifting, ambulation and eccentric single leg control.    [] UE / cervical: cervical, scapular, scapulothoracic and UE control with self care, reaching, carrying, lifting, house/yardwork, driving, computer work.   [] (59370) Therapist is in constant attendance of 2 or more patients providing skilled therapy interventions, but not providing any significant amount of measurable one-on-one time to either patient, for improvements in  [] LE / lumbar: LE, proximal hip, and core control in self care, mobility, lifting, ambulation and eccentric single leg control. [] UE / cervical: cervical, scapular, scapulothoracic and UE control with self care, reaching, carrying, lifting, house/yardwork, driving, computer work. NMR and Therapeutic Activities:    [] (47292 or 18853) Provided verbal/tactile cueing for activities related to improving balance, coordination, kinesthetic sense, posture, motor skill, proprioception and motor activation to allow for proper function of   [] LE: / Lumbar core, proximal hip and LE with self care and ADLs  [] UE / Cervical: cervical, postural, scapular, scapulothoracic and UE control with self care, carrying, lifting, driving, computer work.   [] (47375) Gait Re-education- Provided training and instruction to the patient for proper LE, core and proximal hip recruitment and positioning and eccentric body weight control with ambulation re-education including up and down stairs     Home Management Training / Self Care:  [] (15041) Provided self-care/home management training related to activities of daily living and compensatory training, and/or use of adaptive equipment for improvement with: ADLs and compensatory training, meal preparation, safety procedures and instruction in use of adaptive equipment, including bathing, grooming, dressing, personal hygiene, basic household cleaning and chores.      Home Exercise Program:    [x] (51870) Reviewed/Progressed HEP activities related to strengthening, flexibility, endurance, ROM of   [] LE / Lumbar: core, proximal hip and LE for functional self-care, mobility, lifting and ambulation/stair navigation   [] UE / Cervical: cervical, postural, scapular, scapulothoracic and UE control with self care, reaching, carrying, lifting, house/yardwork, driving, computer work  [] (47657)Reviewed/Progressed HEP activities related to improving balance, coordination, kinesthetic sense, posture, motor skill, proprioception of   [] LE: core, proximal hip and LE for self care, mobility, lifting, and ambulation/stair navigation    [] UE / Cervical: cervical, postural,  scapular, scapulothoracic and UE control with self care, reaching, carrying, lifting, house/yardwork, driving, computer work    Manual Treatments:  PROM / STM / Oscillations-Mobs:  G-I, II, III, IV (PA's, Inf., Post.)  [] (41682) Provided manual therapy to mobilize LE, proximal hip and/or LS spine soft tissue/joints for the purpose of modulating pain, promoting relaxation,  increasing ROM, reducing/eliminating soft tissue swelling/inflammation/restriction, improving soft tissue extensibility and allowing for proper ROM for normal function with   [] LE / lumbar: self care, mobility, lifting and ambulation. [] UE / Cervical: self care, reaching, carrying, lifting, house/yardwork, driving, computer work. Modalities:  [] (91938) Vasopneumatic compression: Utilized vasopneumatic compression to decrease edema / swelling for the purpose of improving mobility and quad tone / recruitment which will allow for increased overall function including but not limited to self-care, transfers, ambulation, and ascending / descending stairs. Charges:  Timed Code Treatment Minutes: 15   Total Treatment Minutes: 47     [x] EVAL - LOW (98293)   [] EVAL - MOD (93308)  [] EVAL - HIGH (81154)  [] RE-EVAL (69422)  [x] UA(48765) x 1       [] Ionto  [] NMR (03994) x       [] Vaso  [] Manual (23927) x       [] Ultrasound  [x] TA x 1       [] Mech Traction (22681)  [] Aquatic Therapy x     [] ES (un) (85737):   [] Home Management Training x  [] ES(attended) (35529)   [] Dry Needling 1-2 muscles (82899):  [] Dry Needling 3+ muscles (846751)  [] Group:      [] Other:     GOALS:     Patient stated goal: learn new exercises to maintain, help with pain relief, go up/down steps better, walk without feeling of instability and not use a cane at all.   []? Progressing: []? Met: []?  Not Met: []? Adjusted     Therapist goals for Patient:   Short Term Goals: To be achieved in: 2 weeks  1. Independent in HEP and progression per patient tolerance, in order to prevent re-injury. []? Progressing: []? Met: []? Not Met: []? Adjusted  2. Patient will have a decrease in pain to facilitate improvement in movement, function, and ADLs as indicated by Functional Deficits. []? Progressing: []? Met: []? Not Met: []? Adjusted     Long Term Goals: To be achieved in:  weeks  1. Disability index score of 20% or less for the LEFS to assist with reaching prior level of function. []? Progressing: []? Met: []? Not Met: []? Adjusted  2. Patient will demonstrate increased AROM to 120* left knee flexion to allow for proper joint functioning as indicated by patients Functional Deficits. []? Progressing: []? Met: []? Not Met: []? Adjusted  3. Patient will demonstrate an increase in Strength to at least  4+/5 as well as good proximal hip strength and control to allow for proper functional mobility as indicated by patients Functional Deficits. []? Progressing: []? Met: []? Not Met: []? Adjusted  4. Patient will return to functional activities including  Ascending/descending steps in a step-to pattern without increased symptoms or restriction. []? Progressing: []? Met: []? Not Met: []? Adjusted    Overall Progression Towards Functional goals/ Treatment Progress Update:  [] Patient is progressing as expected towards functional goals listed. [] Progression is slowed due to complexities/Impairments listed. [] Progression has been slowed due to co-morbidities.   [x] Plan just implemented, too soon to assess goals progression <30days   [] Goals require adjustment due to lack of progress  [] Patient is not progressing as expected and requires additional follow up with physician  [] Other    Persisting Functional Limitations/Impairments:  []Sleeping []Sitting               [x]Standing []Transfers        [x]Walking []Kneeling               [x]Stairs [x]Squatting / bending   []ADLs [x]Reaching  []Lifting  []Housework  []Driving []Job related tasks  []Sports/Recreation []Other:        ASSESSMENT:  See eval  Treatment/Activity Tolerance:  [] Patient able to complete tx [] Patient limited by fatigue  [] Patient limited by pain  [] Patient limited by other medical complications  [] Other:     Prognosis: [] Good [] Fair  [] Poor    Patient Requires Follow-up: [x] Yes  [] No    Plan for next treatment session:    PLAN: See eval. PT 1-2x / week for 6 weeks. [] Continue per plan of care [] Alter current plan (see comments)  [x] Plan of care initiated [] Hold pending MD visit [] Discharge    Electronically signed by: Nikos Savage PT PT    Note: If patient does not return for scheduled/ recommended follow up visits, this note will serve as a discharge from care along with most recent update on progress.

## 2021-12-02 ENCOUNTER — APPOINTMENT (OUTPATIENT)
Dept: PHYSICAL THERAPY | Age: 71
End: 2021-12-02
Payer: MEDICARE

## 2021-12-08 ENCOUNTER — APPOINTMENT (OUTPATIENT)
Dept: PHYSICAL THERAPY | Age: 71
End: 2021-12-08
Payer: MEDICARE

## 2021-12-10 RX ORDER — SIMVASTATIN 40 MG
TABLET ORAL
Qty: 90 TABLET | Refills: 1 | Status: SHIPPED | OUTPATIENT
Start: 2021-12-10 | End: 2022-04-27

## 2021-12-15 RX ORDER — LEVOTHYROXINE SODIUM 0.15 MG/1
150 TABLET ORAL DAILY
Qty: 90 TABLET | Refills: 1 | Status: SHIPPED | OUTPATIENT
Start: 2021-12-15 | End: 2022-05-05

## 2021-12-15 NOTE — TELEPHONE ENCOUNTER
Meagan  with [facility] is calling to request a 90 day supply refill of levothyroxine (SYNTHROID) 150 MCG tablet    Please contact Gil at the number provided to advise.      Last appointment: 9/27/2021  Next appointment: 2/22/2022  Last refill: 12/02/20    Please reference order number [id number]

## 2021-12-16 ENCOUNTER — HOSPITAL ENCOUNTER (OUTPATIENT)
Dept: PHYSICAL THERAPY | Age: 71
Setting detail: THERAPIES SERIES
Discharge: HOME | End: 2021-12-16
Payer: MEDICARE

## 2021-12-16 PROCEDURE — 97110 THERAPEUTIC EXERCISES: CPT

## 2021-12-16 PROCEDURE — 97112 NEUROMUSCULAR REEDUCATION: CPT

## 2021-12-16 NOTE — FLOWSHEET NOTE
[address] [facility] Physical Therapy  Phone: [phone]   Fax: [phone]    Physical Therapy Daily Treatment Note  Date:  2021    Patient Name:  Dominguez Rodriguez    :  1950  MRN: [id number]  Medical/Treatment Diagnosis Information:  · Diagnosis: Chronic pain of left knee M25.562, G89.29  · Treatment Diagnosis: Decreased standing/ambulation tolerance, difficulty with stairs, squatting, decreased functional activity tolerance  Insurance/Certification information:  PT Insurance Information: Medicare  Physician Information:  Referring Practitioner: Jeanette Anthony  Plan of care signed (Y/N): []  Yes [x]  No     Date of Patient follow up with Physician:      Progress Report: []  Yes  [x]  No     Date Range for reporting period:  Beginnin/24   Ending:     Progress report due (10 Rx/or 30 days whichever is less): visit #75 or 77/76    Recertification due (POC duration/ or 90 days whichever is less): visit #    Visit # Insurance Allowable Auth required? Date Range   3/6 [place] []  Yes  []  No            Latex Allergy:  [x]NO      []YES  Preferred Language for Healthcare:   [x]English       []other:    Functional Scale:        Date assessed:  LEFS: raw score = 33/80; dysfunction = %      Pain level:  6-7/10 left knee     SUBJECTIVE:   Says she has a headache today so called to cancel but didn't reach anyone, said she will do as much as she can. Been working on the exercises.          OBJECTIVE: See eval      RESTRICTIONS/PRECAUTIONS:     Exercises/Interventions:     Therapeutic Exercises (47054) Resistance / level Sets/sec Reps Notes   Nustep   X 4 mins            IB, HR/TR  30\"  X 2   2 x 10     HS/HF Stretch              Step Ups Fwd 6\"  X 15 B    Step Ups Lat 6\"  X 15 B           SLR Flex   X 10 L    Clamshells       Quad Set  5\"  X 10                   Leg Press                     Therapeutic Activities (15999)              Mini Squats       Lateral Band Walk Neuromuscular Re-ed (43169)              3-way hip, band     Hip abd is part of HEP           Tandem balance Airex  30\"  x 2 B    SLS  20\"  X 2 B                         Shuttle               Manual Intervention (12967)                                                     Modalities:     Pt. Education:  -patient educated on diagnosis, prognosis and expectations for rehab  -all patient questions were answered    Home Exercise Program:      Access Code: [email or web address]  URL: [email or web address]  Date: 11/24/2021  Prepared by: Natanael Angela     Exercises  Supine Quad Set - 2 x daily - 7 x weekly - 1-2 sets - 10 reps - 5 secs hold  Supine Active Straight Leg Raise - 2 x daily - 7 x weekly - 2-3 sets - 10 reps  Supine Heel Slide - 2 x daily - 7 x weekly - 2-3 sets - 10 reps  Clamshell with Resistance - 2 x daily - 7 x weekly - 2-3 sets - 10 reps  Side Stepping with Resistance at Ankles - 2 x daily - 7 x weekly - 2-3 sets - 10 reps  Standing Hip Abduction with Resistance at Ankles and Counter Support - 2 x daily - 7 x weekly - 2-3 sets - 10 reps    Therapeutic Exercise and NMR EXR  [] (14012) Provided verbal/tactile cueing for activities related to strengthening, flexibility, endurance, ROM for improvements in  [] LE / Lumbar: LE, proximal hip, and core control with self care, mobility, lifting, ambulation. [] UE / Cervical: cervical, postural, scapular, scapulothoracic and UE control with self care, reaching, carrying, lifting, house/yardwork, driving, computer work.  [] (18599) Provided verbal/tactile cueing for activities related to improving balance, coordination, kinesthetic sense, posture, motor skill, proprioception to assist with   [] LE / lumbar: LE, proximal hip, and core control in self care, mobility, lifting, ambulation and eccentric single leg control.    [] UE / cervical: cervical, scapular, scapulothoracic and UE control with self care, reaching, carrying, lifting, house/yardwork, driving, computer work.   [] (20388) Therapist is in constant attendance of 2 or more patients providing skilled therapy interventions, but not providing any significant amount of measurable one-on-one time to either patient, for improvements in  [] LE / lumbar: LE, proximal hip, and core control in self care, mobility, lifting, ambulation and eccentric single leg control. [] UE / cervical: cervical, scapular, scapulothoracic and UE control with self care, reaching, carrying, lifting, house/yardwork, driving, computer work. NMR and Therapeutic Activities:    [] (39675 or 72930) Provided verbal/tactile cueing for activities related to improving balance, coordination, kinesthetic sense, posture, motor skill, proprioception and motor activation to allow for proper function of   [] LE: / Lumbar core, proximal hip and LE with self care and ADLs  [] UE / Cervical: cervical, postural, scapular, scapulothoracic and UE control with self care, carrying, lifting, driving, computer work.   [] (34586) Gait Re-education- Provided training and instruction to the patient for proper LE, core and proximal hip recruitment and positioning and eccentric body weight control with ambulation re-education including up and down stairs     Home Management Training / Self Care:  [] (02748) Provided self-care/home management training related to activities of daily living and compensatory training, and/or use of adaptive equipment for improvement with: ADLs and compensatory training, meal preparation, safety procedures and instruction in use of adaptive equipment, including bathing, grooming, dressing, personal hygiene, basic household cleaning and chores.      Home Exercise Program:    [x] (25241) Reviewed/Progressed HEP activities related to strengthening, flexibility, endurance, ROM of   [] LE / Lumbar: core, proximal hip and LE for functional self-care, mobility, lifting and ambulation/stair navigation   [] UE / Cervical: cervical, postural, scapular, scapulothoracic and UE control with self care, reaching, carrying, lifting, house/yardwork, driving, computer work  [] (96989)Reviewed/Progressed HEP activities related to improving balance, coordination, kinesthetic sense, posture, motor skill, proprioception of   [] LE: core, proximal hip and LE for self care, mobility, lifting, and ambulation/stair navigation    [] UE / Cervical: cervical, postural,  scapular, scapulothoracic and UE control with self care, reaching, carrying, lifting, house/yardwork, driving, computer work    Manual Treatments:  PROM / STM / Oscillations-Mobs:  G-I, II, III, IV (PA's, Inf., Post.)  [] (72100) Provided manual therapy to mobilize LE, proximal hip and/or LS spine soft tissue/joints for the purpose of modulating pain, promoting relaxation,  increasing ROM, reducing/eliminating soft tissue swelling/inflammation/restriction, improving soft tissue extensibility and allowing for proper ROM for normal function with   [] LE / lumbar: self care, mobility, lifting and ambulation. [] UE / Cervical: self care, reaching, carrying, lifting, house/yardwork, driving, computer work. Modalities:  [] (64266) Vasopneumatic compression: Utilized vasopneumatic compression to decrease edema / swelling for the purpose of improving mobility and quad tone / recruitment which will allow for increased overall function including but not limited to self-care, transfers, ambulation, and ascending / descending stairs.        Charges:  Timed Code Treatment Minutes: 39   Total Treatment Minutes: 39     [] EVAL - LOW (91367)   [] EVAL - MOD (58636)  [] EVAL - HIGH (78585)  [] RE-EVAL (56551)  [x] FW(09960) x 2       [] Ionto  [x] NMR (60892) x 1      [] Vaso  [] Manual (36518) x       [] Ultrasound  [] TA x 1       [] Mech Traction (32169)  [] Aquatic Therapy x     [] ES (un) (47643):   [] Home Management Training x  [] ES(attended) (34363)   [] Dry Needling 1-2 muscles (57019):  [] Dry Needling 3+ muscles (313110)  [] Group:      [] Other:     GOALS:     Patient stated goal: learn new exercises to maintain, help with pain relief, go up/down steps better, walk without feeling of instability and not use a cane at all.   []? Progressing: []? Met: []? Not Met: []? Adjusted     Therapist goals for Patient:   Short Term Goals: To be achieved in: 2 weeks  1. Independent in HEP and progression per patient tolerance, in order to prevent re-injury. []? Progressing: []? Met: []? Not Met: []? Adjusted  2. Patient will have a decrease in pain to facilitate improvement in movement, function, and ADLs as indicated by Functional Deficits. []? Progressing: []? Met: []? Not Met: []? Adjusted     Long Term Goals: To be achieved in:  weeks  1. Disability index score of 20% or less for the LEFS to assist with reaching prior level of function. []? Progressing: []? Met: []? Not Met: []? Adjusted  2. Patient will demonstrate increased AROM to 120* left knee flexion to allow for proper joint functioning as indicated by patients Functional Deficits. []? Progressing: []? Met: []? Not Met: []? Adjusted  3. Patient will demonstrate an increase in Strength to at least  4+/5 as well as good proximal hip strength and control to allow for proper functional mobility as indicated by patients Functional Deficits. []? Progressing: []? Met: []? Not Met: []? Adjusted  4. Patient will return to functional activities including  Ascending/descending steps in a step-to pattern without increased symptoms or restriction. []? Progressing: []? Met: []? Not Met: []? Adjusted    Overall Progression Towards Functional goals/ Treatment Progress Update:  [] Patient is progressing as expected towards functional goals listed. [] Progression is slowed due to complexities/Impairments listed. [] Progression has been slowed due to co-morbidities.   [x] Plan just implemented, too soon to assess goals progression <30days   [] Goals require adjustment due to lack of progress  [] Patient is not progressing as expected and requires additional follow up with physician  [] Other    Persisting Functional Limitations/Impairments:  []Sleeping []Sitting               [x]Standing []Transfers        [x]Walking []Kneeling               [x]Stairs [x]Squatting / bending   []ADLs [x]Reaching  []Lifting  []Housework  []Driving []Job related tasks  []Sports/Recreation []Other:        ASSESSMENT:  Achieved quad fatigue and mild pain with activities. Demonstrates mild difficulty initating SLR, and fair balance overall. Reduced response due to patient not feeling well overall today and initially wanting to cancel today's visit. Pt would continue to benefit from skilled PT services in order to achieve optimal outcomes with regards to CKC function and stability to achieve maximal functional activity tolerance    Treatment/Activity Tolerance:  [] Patient able to complete tx [] Patient limited by fatigue  [] Patient limited by pain  [] Patient limited by other medical complications  [] Other:     Prognosis: [] Good [] Fair  [] Poor    Patient Requires Follow-up: [x] Yes  [] No    Plan for next treatment session:    PLAN: See eval. PT 1-2x / week for 6 weeks. [x] Continue per plan of care [] Alter current plan (see comments)  [] Plan of care initiated [] Hold pending MD visit [] Discharge    Electronically signed by: Ester Minor PT PT    Note: If patient does not return for scheduled/ recommended follow up visits, this note will serve as a discharge from care along with most recent update on progress.

## 2021-12-22 ENCOUNTER — HOSPITAL ENCOUNTER (OUTPATIENT)
Dept: PHYSICAL THERAPY | Age: 71
Setting detail: THERAPIES SERIES
Discharge: HOME | End: 2021-12-22
Payer: MEDICARE

## 2021-12-22 NOTE — FLOWSHEET NOTE
[address] [facility]    Physical Therapy  Cancellation/No-show Note  Patient Name:  Mikala Beck  :  1950   Date:  2021    Cancelled visits to date: 1  No-shows to date: 0    For today's appointment patient:  [x]  Cancelled   []  Rescheduled appointment  []  No-show     Reason given by patient:  []  Patient ill  []  Conflicting appointment  []  No transportation    []  Conflict with work  [x]  No reason given  []  Other:     Comments:      Phone call information:   []  Phone call made today to patient at _ time at number provided:      []  Patient answered, conversation as follows:    []  Patient did not answer, message left as follows:  []  Phone call not made today  [x]  Phone call not needed - pt contacted us to cancel and provided reason for cancellation.      Electronically signed by:  Jessica Perez PT, PT

## 2021-12-27 ENCOUNTER — HOSPITAL ENCOUNTER (OUTPATIENT)
Dept: PHYSICAL THERAPY | Age: 71
Setting detail: THERAPIES SERIES
Discharge: HOME | End: 2021-12-27
Payer: MEDICARE

## 2021-12-27 NOTE — FLOWSHEET NOTE
[address]    Physical Therapy  Cancellation/No-show Note  Patient Name:  Satya Torres  :  1950   Date:  2021    Cancelled visits to date: 2  No-shows to date: 0    For today's appointment patient:  [x]  Cancelled ,   []  Rescheduled appointment  []  No-show     Reason given by patient:  []  Patient ill  []  Conflicting appointment  []  No transportation    []  Conflict with work  [x]  No reason given  []  Other:     Comments:  Patient arrived to appointment and said she is having a family crisis with her sister having to now undergo chemo for cancer and having to stay with her at her home. Will let us know if still have to take care of sister prior to next appointment on if need to reschedule     Phone call information:   []  Phone call made today to patient at _ time at number provided:      []  Patient answered, conversation as follows:    []  Patient did not answer, message left as follows:  []  Phone call not made today  [x]  Phone call not needed - pt contacted us to cancel and provided reason for cancellation.      Electronically signed by:  Umang Smith, PT, PT

## 2021-12-30 ENCOUNTER — HOSPITAL ENCOUNTER (OUTPATIENT)
Dept: PHYSICAL THERAPY | Age: 71
Setting detail: THERAPIES SERIES
Discharge: HOME | End: 2021-12-30
Payer: MEDICARE

## 2021-12-30 PROCEDURE — 97110 THERAPEUTIC EXERCISES: CPT

## 2021-12-30 PROCEDURE — 97112 NEUROMUSCULAR REEDUCATION: CPT

## 2021-12-30 NOTE — FLOWSHEET NOTE
[address] [facility]  Phone: [phone]   Fax: [phone]    Physical Therapy Daily Treatment Note  Date:  2021    Patient Name:  Daryl Connor    :  1950  MRN: [id number]  Medical/Treatment Diagnosis Information:  · Diagnosis: Chronic pain of left knee M25.562, G89.29  · Treatment Diagnosis: Decreased standing/ambulation tolerance, difficulty with stairs, squatting, decreased functional activity tolerance  Insurance/Certification information:  PT Insurance Information: Medicare  Physician Information:  Referring Practitioner: Ashutosh Cool  Plan of care signed (Y/N): []  Yes [x]  No     Date of Patient follow up with Physician:      Progress Report: []  Yes  [x]  No     Date Range for reporting period:  Beginnin/24   Ending:     Progress report due (10 Rx/or 30 days whichever is less): visit #02 or     Recertification due (POC duration/ or 90 days whichever is less): visit #    Visit # Insurance Allowable Auth required? Date Range    [place] []  Yes  []  No            Latex Allergy:  [x]NO      []YES  Preferred Language for Healthcare:   [x]English       []other:    Functional Scale:        Date assessed:  LEFS: raw score = 33/80; dysfunction = %      Pain level:  6-7/10 left knee     SUBJECTIVE:   Says that her knee is bothering her a lot today, likely due to the rain. Hurt a lot yesterday too, had problems getting out of the car.            OBJECTIVE: See eval      RESTRICTIONS/PRECAUTIONS:     Exercises/Interventions:     Therapeutic Exercises (76354) Resistance / level Sets/sec Reps Notes   Nustep   X 4 mins            IB, HR/TR  30\"  X 2   2 x 10     HS/HF Stretch              Step Ups Fwd 6\"  X 15 B    Step Ups Lat 6\"  X 15 B           SLR Flex  2 X 10 L    Clamshells       Quad Set  5\"  X 15           T.G. Squats with ball squeeze   X 15     Leg Press                     Therapeutic Activities (43554)              Mini scapulothoracic and UE control with self care, reaching, carrying, lifting, house/yardwork, driving, computer work.   [] (45048) Therapist is in constant attendance of 2 or more patients providing skilled therapy interventions, but not providing any significant amount of measurable one-on-one time to either patient, for improvements in  [] LE / lumbar: LE, proximal hip, and core control in self care, mobility, lifting, ambulation and eccentric single leg control. [] UE / cervical: cervical, scapular, scapulothoracic and UE control with self care, reaching, carrying, lifting, house/yardwork, driving, computer work. NMR and Therapeutic Activities:    [] (42532 or 31615) Provided verbal/tactile cueing for activities related to improving balance, coordination, kinesthetic sense, posture, motor skill, proprioception and motor activation to allow for proper function of   [] LE: / Lumbar core, proximal hip and LE with self care and ADLs  [] UE / Cervical: cervical, postural, scapular, scapulothoracic and UE control with self care, carrying, lifting, driving, computer work.   [] (06843) Gait Re-education- Provided training and instruction to the patient for proper LE, core and proximal hip recruitment and positioning and eccentric body weight control with ambulation re-education including up and down stairs     Home Management Training / Self Care:  [] (21411) Provided self-care/home management training related to activities of daily living and compensatory training, and/or use of adaptive equipment for improvement with: ADLs and compensatory training, meal preparation, safety procedures and instruction in use of adaptive equipment, including bathing, grooming, dressing, personal hygiene, basic household cleaning and chores.      Home Exercise Program:    [x] (38540) Reviewed/Progressed HEP activities related to strengthening, flexibility, endurance, ROM of   [] LE / Lumbar: core, proximal hip and LE for functional self-care, mobility, lifting and ambulation/stair navigation   [] UE / Cervical: cervical, postural, scapular, scapulothoracic and UE control with self care, reaching, carrying, lifting, house/yardwork, driving, computer work  [] (93073)Reviewed/Progressed HEP activities related to improving balance, coordination, kinesthetic sense, posture, motor skill, proprioception of   [] LE: core, proximal hip and LE for self care, mobility, lifting, and ambulation/stair navigation    [] UE / Cervical: cervical, postural,  scapular, scapulothoracic and UE control with self care, reaching, carrying, lifting, house/yardwork, driving, computer work    Manual Treatments:  PROM / STM / Oscillations-Mobs:  G-I, II, III, IV (PA's, Inf., Post.)  [] (71089) Provided manual therapy to mobilize LE, proximal hip and/or LS spine soft tissue/joints for the purpose of modulating pain, promoting relaxation,  increasing ROM, reducing/eliminating soft tissue swelling/inflammation/restriction, improving soft tissue extensibility and allowing for proper ROM for normal function with   [] LE / lumbar: self care, mobility, lifting and ambulation. [] UE / Cervical: self care, reaching, carrying, lifting, house/yardwork, driving, computer work. Modalities:  [] (40794) Vasopneumatic compression: Utilized vasopneumatic compression to decrease edema / swelling for the purpose of improving mobility and quad tone / recruitment which will allow for increased overall function including but not limited to self-care, transfers, ambulation, and ascending / descending stairs.        Charges:  Timed Code Treatment Minutes: 43   Total Treatment Minutes: 53     [] EVAL - LOW (16298)   [] EVAL - MOD (49190)  [] EVAL - HIGH (50153)  [] RE-EVAL (66784)  [x] GC(37512) x 2       [] Ionto  [x] NMR (51504) x 1      [] Vaso  [] Manual (23798) x       [] Ultrasound  [] TA x 1       [] Mech Traction (85488)  [] Aquatic Therapy x     [] ES (un) (63983):   [] Home Management Training x  [] ES(attended) (50747)   [] Dry Needling 1-2 muscles (67159):  [] Dry Needling 3+ muscles (250924)  [] Group:      [] Other:     GOALS:     Patient stated goal: learn new exercises to maintain, help with pain relief, go up/down steps better, walk without feeling of instability and not use a cane at all.   []? Progressing: []? Met: []? Not Met: []? Adjusted     Therapist goals for Patient:   Short Term Goals: To be achieved in: 2 weeks  1. Independent in HEP and progression per patient tolerance, in order to prevent re-injury. []? Progressing: []? Met: []? Not Met: []? Adjusted  2. Patient will have a decrease in pain to facilitate improvement in movement, function, and ADLs as indicated by Functional Deficits. []? Progressing: []? Met: []? Not Met: []? Adjusted     Long Term Goals: To be achieved in:  weeks  1. Disability index score of 20% or less for the LEFS to assist with reaching prior level of function. []? Progressing: []? Met: []? Not Met: []? Adjusted  2. Patient will demonstrate increased AROM to 120* left knee flexion to allow for proper joint functioning as indicated by patients Functional Deficits. []? Progressing: []? Met: []? Not Met: []? Adjusted  3. Patient will demonstrate an increase in Strength to at least  4+/5 as well as good proximal hip strength and control to allow for proper functional mobility as indicated by patients Functional Deficits. []? Progressing: []? Met: []? Not Met: []? Adjusted  4. Patient will return to functional activities including  Ascending/descending steps in a step-to pattern without increased symptoms or restriction. []? Progressing: []? Met: []? Not Met: []? Adjusted    Overall Progression Towards Functional goals/ Treatment Progress Update:  [] Patient is progressing as expected towards functional goals listed. [] Progression is slowed due to complexities/Impairments listed.   [] Progression has been slowed due to co-morbidities. [x] Plan just implemented, too soon to assess goals progression <30days   [] Goals require adjustment due to lack of progress  [] Patient is not progressing as expected and requires additional follow up with physician  [] Other    Persisting Functional Limitations/Impairments:  []Sleeping []Sitting               [x]Standing []Transfers        [x]Walking []Kneeling               [x]Stairs [x]Squatting / bending   []ADLs [x]Reaching  []Lifting  []Housework  []Driving []Job related tasks  []Sports/Recreation []Other:        ASSESSMENT:  Achieved quad fatigue and mild pain with activities. Improved ability to perform SLR. Some problems with eccentric control with forward step-ups and discomfort noted with activities today, but was able to participate effectively. Pt would continue to benefit from skilled PT services in order to achieve optimal outcomes with regards to CKC function and stability to achieve maximal functional activity tolerance    Treatment/Activity Tolerance:  [] Patient able to complete tx [] Patient limited by fatigue  [] Patient limited by pain  [] Patient limited by other medical complications  [] Other:     Prognosis: [] Good [] Fair  [] Poor    Patient Requires Follow-up: [x] Yes  [] No    Plan for next treatment session:    PLAN: See eval. PT 1-2x / week for 6 weeks. [x] Continue per plan of care [] Alter current plan (see comments)  [] Plan of care initiated [] Hold pending MD visit [] Discharge    Electronically signed by: Riccardo Kwok, PT PT    Note: If patient does not return for scheduled/ recommended follow up visits, this note will serve as a discharge from care along with most recent update on progress.

## 2022-01-10 ENCOUNTER — HOSPITAL ENCOUNTER (OUTPATIENT)
Dept: PHYSICAL THERAPY | Age: 72
Setting detail: THERAPIES SERIES
Discharge: HOME | End: 2022-01-10

## 2022-01-10 NOTE — FLOWSHEET NOTE
[address] [facility]    Physical Therapy  Cancellation/No-show Note  Patient Name:  Jeanna Osullivan  :  1950   Date:  1/10/2022    Cancelled visits to date: 3  No-shows to date: 0    For today's appointment patient:  [x]  Cancelled , , 1/10  []  Rescheduled appointment  []  No-show     Reason given by patient:  [x]  Patient ill  []  Conflicting appointment  []  No transportation    []  Conflict with work  []  No reason given  []  Other:     Comments:  Patient arrived to appointment and said she is having a family crisis with her sister having to now undergo chemo for cancer and having to stay with her at her home. Will let us know if still have to take care of sister prior to next appointment on if need to reschedule     Phone call information:   []  Phone call made today to patient at _ time at number provided:      []  Patient answered, conversation as follows:    []  Patient did not answer, message left as follows:  []  Phone call not made today  [x]  Phone call not needed - pt contacted us to cancel and provided reason for cancellation.      Electronically signed by:  Julito Olson, PT, PT

## 2022-02-08 ENCOUNTER — PATIENT MESSAGE (OUTPATIENT)
Dept: INTERNAL MEDICINE CLINIC | Age: 72
End: 2022-02-08

## 2022-02-08 DIAGNOSIS — I10 PRIMARY HYPERTENSION: ICD-10-CM

## 2022-02-08 DIAGNOSIS — E78.00 PURE HYPERCHOLESTEROLEMIA: Primary | ICD-10-CM

## 2022-02-08 DIAGNOSIS — E03.4 HYPOTHYROIDISM DUE TO ACQUIRED ATROPHY OF THYROID: ICD-10-CM

## 2022-02-08 DIAGNOSIS — R73.9 HYPERGLYCEMIA: ICD-10-CM

## 2022-02-08 NOTE — TELEPHONE ENCOUNTER
From: Alex Vilchis  To: Dr. Irene Tate  Sent: 2/8/2022 12:53 PM EST  Subject: Upcoming Wellness Appointment     Hi    Just checking if Labs have been ordered for my appointment on February 22,2021?     Respectfully     Georgette Mancilla

## 2022-02-13 RX ORDER — LOSARTAN POTASSIUM 50 MG/1
50 TABLET ORAL DAILY
Qty: 90 TABLET | Refills: 1 | Status: SHIPPED | OUTPATIENT
Start: 2022-02-13 | End: 2022-08-04

## 2022-02-16 ENCOUNTER — HOSPITAL ENCOUNTER (OUTPATIENT)
Dept: PHYSICAL THERAPY | Age: 72
Setting detail: THERAPIES SERIES
Discharge: HOME | End: 2022-02-16
Payer: MEDICARE

## 2022-02-16 PROCEDURE — 97164 PT RE-EVAL EST PLAN CARE: CPT

## 2022-02-16 PROCEDURE — 97530 THERAPEUTIC ACTIVITIES: CPT
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
## 2022-02-18 ENCOUNTER — HOSPITAL ENCOUNTER (OUTPATIENT)
Dept: PHYSICAL THERAPY | Age: 72
Setting detail: THERAPIES SERIES
Discharge: HOME | End: 2022-02-18
Payer: MEDICARE

## 2022-02-18 PROCEDURE — 97150 GROUP THERAPEUTIC PROCEDURES: CPT

## 2022-02-18 PROCEDURE — 97113 AQUATIC THERAPY/EXERCISES: CPT

## 2022-02-18 NOTE — FLOWSHEET NOTE
Neuromuscular Re-ed (13863)              3-way hip, band     Hip abd is part of HEP           Tandem balance Airex  30\"  x 2 B    SLS  20\"  X 2 B    Airex Hip Abduction   X 15 B                  Shuttle               Manual Intervention (40294)                                                   AquaticTherapy Dates of Service:   Aquatic Visits Exercises/Activities:   Transfers:  [x] Stairs   [] Ramp  [] Chair Lift   % Immersion:            Ambulation/ Warm up:   UE Exercises: Forward   x2 Shoulder Shrugs      Lateral   x2 Shoulder Circles      Retro   x Scapular Retraction      [place]    Push Downs      Heel/Toe Walking    Punching       Rowing       Elbow Flex/Ext       Shldr Flex/Ext       [employer] aBd/aDd    LE Exercises:  Shldr Horiz aBd/aDd    HR/TR x10 Shldr IR/ER    Marches x10 Arm Circles    Squats  x PNF Diagonals    Hamstring Curls x10 Wall Push Ups    Hip Flexion (SLR) x10     Hip aBduction (SLR) x10     Hip Extension (SLR) x10      Hip aDduction (SLR)      Hip Circles x Functional:    Hip IR/Er  Step up forward x   Hip Hikes x Step up lateral  x     Step down        Lunges Forward      Lunges Retro      Lunges Lateral     Balance:        SLS  x      Tandem Stance x      NBOS eyes open 2x30\" Seated:     NBOS eyes closed 2x30\" Ankle pumps     Hand to Opposite Knee x Ankle Circles     Fwd Step ups to SLS  Knee Flex/Ext x10   Lateral Step ups to SLS  Hip aBd/aDd    Stop/Go Gait   Bicycle       Ankle DF/PF      Ankle Inv/Ev    Stretching:       Gastroc/Soleus - wall 2x30\"     Hamstring - step 2x30\" Deep Water:    Knee Flex Stretch  Jog    Piriformis  x Noodle Hang x   Hip Flexor x Traction at Wall    SKTC x     DKTC  x     ITB x Cool Down:    Quad  Fwd Walking x   Mid Back   Lat Walking x   UT  Retro Walking    Post Shoulder  Noodle float x   Ladder Pull      Pec Stretch            Core:   Other:    TrA set x     Pelvic Tilts x     Multifidi Walk outs c paddle x     PNF Chop/Lifts Aquatic Abbreviation Key  B= Belt DB= Dumbells T= Theratube   H= Hydrotone N= Noodles W= Weights   P= Paddles S= Speedo equipment K= Kickboard              Modalities: 12/30  CP to left knee x 10 mins    Pt. Education:  - Gave pt tour of pool, explained how to use lockers, what to wear, that it would be in group setting, and showed pt aquatic equipment.  -patient educated on diagnosis, prognosis and expectations for rehab  -all patient questions were answered    Home Exercise Program:      Access Code: [email or web address]  URL: [email or web address]  Date: 11/24/2021  Prepared by: Jamas Cockayne     Exercises  Supine Quad Set - 2 x daily - 7 x weekly - 1-2 sets - 10 reps - 5 secs hold  Supine Active Straight Leg Raise - 2 x daily - 7 x weekly - 2-3 sets - 10 reps  Supine Heel Slide - 2 x daily - 7 x weekly - 2-3 sets - 10 reps  Clamshell with Resistance - 2 x daily - 7 x weekly - 2-3 sets - 10 reps  Side Stepping with Resistance at Ankles - 2 x daily - 7 x weekly - 2-3 sets - 10 reps  Standing Hip Abduction with Resistance at Ankles and Counter Support - 2 x daily - 7 x weekly - 2-3 sets - 10 reps    Therapeutic Exercise and NMR EXR  [] (01935) Provided verbal/tactile cueing for activities related to strengthening, flexibility, endurance, ROM for improvements in  [x] LE / Lumbar: LE, proximal hip, and core control with self care, mobility, lifting, ambulation. [] UE / Cervical: cervical, postural, scapular, scapulothoracic and UE control with self care, reaching, carrying, lifting, house/yardwork, driving, computer work.  [] (27801) Provided verbal/tactile cueing for activities related to improving balance, coordination, kinesthetic sense, posture, motor skill, proprioception to assist with   [x] LE / lumbar: LE, proximal hip, and core control in self care, mobility, lifting, ambulation and eccentric single leg control.    [] UE / cervical: cervical, scapular, scapulothoracic and UE control with self care, reaching, carrying, lifting, house/yardwork, driving, computer work.   [] (67618) Therapist is in constant attendance of 2 or more patients providing skilled therapy interventions, but not providing any significant amount of measurable one-on-one time to either patient, for improvements in  [] LE / lumbar: LE, proximal hip, and core control in self care, mobility, lifting, ambulation and eccentric single leg control. [] UE / cervical: cervical, scapular, scapulothoracic and UE control with self care, reaching, carrying, lifting, house/yardwork, driving, computer work. [x] (55207) Aquatic therapy with therapeutic exercise. Provided verbal and tactile cueing for activities related to strengthening, flexibility, endurance, ROM for improvements in  [x] LE / lumbar: LE, proximal hip, and core control in self care, mobility, lifting, ambulation and eccentric single leg control. [] UE / cervical: cervical, scapular, scapulothoracic and UE control with self care, reaching, carrying, lifting, house/yardwork, driving, computer work. [x] (20495) Therapist is in constant attendance of 2 or more patients providing skilled therapy interventions, but not providing any significant amount of measurable one-on-one time to either patient, for improvements in  [x] LE / lumbar: LE, proximal hip, and core control in self care, mobility, lifting, ambulation and eccentric single leg control. [] UE / cervical: cervical, scapular, scapulothoracic and UE control with self care, reaching, carrying, lifting, house/yardwork, driving, computer work.        NMR and Therapeutic Activities:    [] (40443 or 04761) Provided verbal/tactile cueing for activities related to improving balance, coordination, kinesthetic sense, posture, motor skill, proprioception and motor activation to allow for proper function of   [] LE: / Lumbar core, proximal hip and LE with self care and ADLs  [] UE / Cervical: cervical, postural, scapular, scapulothoracic and UE control with self care, carrying, lifting, driving, computer work.   [] (55938) Gait Re-education- Provided training and instruction to the patient for proper LE, core and proximal hip recruitment and positioning and eccentric body weight control with ambulation re-education including up and down stairs     Home Management Training / Self Care:  [] (96789) Provided self-care/home management training related to activities of daily living and compensatory training, and/or use of adaptive equipment for improvement with: ADLs and compensatory training, meal preparation, safety procedures and instruction in use of adaptive equipment, including bathing, grooming, dressing, personal hygiene, basic household cleaning and chores.      Home Exercise Program:    [x] (62395) Reviewed/Progressed HEP activities related to strengthening, flexibility, endurance, ROM of   [] LE / Lumbar: core, proximal hip and LE for functional self-care, mobility, lifting and ambulation/stair navigation   [] UE / Cervical: cervical, postural, scapular, scapulothoracic and UE control with self care, reaching, carrying, lifting, house/yardwork, driving, computer work  [] (63005)Reviewed/Progressed HEP activities related to improving balance, coordination, kinesthetic sense, posture, motor skill, proprioception of   [] LE: core, proximal hip and LE for self care, mobility, lifting, and ambulation/stair navigation    [] UE / Cervical: cervical, postural,  scapular, scapulothoracic and UE control with self care, reaching, carrying, lifting, house/yardwork, driving, computer work    Manual Treatments:  PROM / STM / Oscillations-Mobs:  G-I, II, III, IV (PA's, Inf., Post.)  [] (74385) Provided manual therapy to mobilize LE, proximal hip and/or LS spine soft tissue/joints for the purpose of modulating pain, promoting relaxation,  increasing ROM, reducing/eliminating soft tissue swelling/inflammation/restriction, improving soft tissue extensibility and allowing for proper ROM for normal function with   [] LE / lumbar: self care, mobility, lifting and ambulation. [] UE / Cervical: self care, reaching, carrying, lifting, house/yardwork, driving, computer work. Modalities:  [] (55107) Vasopneumatic compression: Utilized vasopneumatic compression to decrease edema / swelling for the purpose of improving mobility and quad tone / recruitment which will allow for increased overall function including but not limited to self-care, transfers, ambulation, and ascending / descending stairs. Charges:  Timed Code Treatment Minutes: 8   Total Treatment Minutes: 42     [] EVAL - LOW (57301)   [] EVAL - MOD (06172)  [] EVAL - HIGH (56077)  [] RE-EVAL (98333)  [] OE(64327) x        [] Ionto  [] NMR (07025) x       [] Vaso  [] Manual (86429) x       [] Ultrasound  [] TA x        [] Mech Traction (38807)  [x] Aquatic Therapy x 1    [] ES (un) (82141):   [] Home Management Training x  [] ES(attended) (69020)   [] Dry Needling 1-2 muscles (18948):  [] Dry Needling 3+ muscles (899823)  [x] Group:      [] Other:     GOALS:     Patient stated goal: learn new exercises to maintain, help with pain relief, go up/down steps better, walk without feeling of instability and not use a cane at all.   []? Progressing: []? Met: []? Not Met: []? Adjusted     Therapist goals for Patient:   Short Term Goals: To be achieved in: 2 weeks  1. Independent in HEP and progression per patient tolerance, in order to prevent re-injury. []? Progressing: []? Met: []? Not Met: []? Adjusted  2. Patient will have a decrease in pain to <= 3/10 in left knee and right leg to  facilitate improvement in movement, function, and ADLs as indicated by Functional Deficits. []? Progressing: []? Met: []? Not Met: []? Adjusted     Long Term Goals: To be achieved in:  5 weeks or discharge   1. Disability index score of 20% or less for the LEFS to assist with reaching prior level of function. []?  Progressing: []? Met: []? Not Met: []? Adjusted  2. Patient will demonstrate increased AROM to 120* left knee flexion to allow for proper joint functioning as indicated by patients Functional Deficits. []? Progressing: []? Met: []? Not Met: []? Adjusted  3. Patient will demonstrate an increase in Strength to at least  4+/5 in bilateral LE as well as good proximal hip strength and control to allow for proper functional mobility as indicated by patients Functional Deficits. []? Progressing: []? Met: []? Not Met: []? Adjusted  4. Patient will return to functional activities including  Ascending/descending steps in a step-to pattern without increased symptoms or restriction. []? Progressing: []? Met: []? Not Met: []? Adjusted  5. Patient will demonstrate the ability to ambulate without evidence of antalgia and normal step/stride length without A.D. in order to resume her prior functional activities without increased symptoms or restriction  []? Progressing: []? Met: []? Not Met: []? Adjusted      Overall Progression Towards Functional goals/ Treatment Progress Update:  [x] Patient is progressing as expected towards functional goals listed. [] Progression is slowed due to complexities/Impairments listed. [] Progression has been slowed due to co-morbidities. [] Plan just implemented, too soon to assess goals progression <30days   [] Goals require adjustment due to lack of progress  [] Patient is not progressing as expected and requires additional follow up with physician  [] Other    Persisting Functional Limitations/Impairments:  []Sleeping []Sitting               [x]Standing []Transfers        [x]Walking []Kneeling               [x]Stairs [x]Squatting / bending   []ADLs [x]Reaching  []Lifting  []Housework  []Driving []Job related tasks  []Sports/Recreation []Other:        ASSESSMENT:  Pt performed exercises this date for initial aquatic session without report of increased pain.   Pt stiff with exercises this date and report of decreased pain post therapy session. Education to monitor symptoms and report tolerance at next visit. Upgrade routine as tolerated to promote improved strength and joint stability for decreased pain and improved function. Treatment/Activity Tolerance:  [] Patient able to complete tx  [] Patient limited by fatigue  [x] Patient limited by pain  [x] Patient limited by other medical complications  [] Other:     Prognosis: [x] Good [x] Fair  [] Poor    Patient Requires Follow-up: [x] Yes  [] No    Plan for next treatment session:  1 aquatic and 1 land session per week to work on left knee as well as exacerbation of right leg/low back deficits that will impact tolerance to left knee and strengthening. PLAN: See eval. PT 2x/week x 5 weeks to include both land and aquatic therapy   [x] Continue per plan of care [] Alter current plan (see comments)  [] Plan of care initiated [] Hold pending MD visit [] Discharge    Electronically signed by: Alphonse Bella    Note: If patient does not return for scheduled/ recommended follow up visits, this note will serve as a discharge from care along with most recent update on progress.

## 2022-02-21 ENCOUNTER — HOSPITAL ENCOUNTER (OUTPATIENT)
Age: 72
Discharge: HOME | End: 2022-02-21
Payer: MEDICARE

## 2022-02-21 ENCOUNTER — HOSPITAL ENCOUNTER (OUTPATIENT)
Dept: PHYSICAL THERAPY | Age: 72
Setting detail: THERAPIES SERIES
Discharge: HOME | End: 2022-02-21
Payer: MEDICARE

## 2022-02-21 DIAGNOSIS — E78.00 PURE HYPERCHOLESTEROLEMIA: ICD-10-CM

## 2022-02-21 DIAGNOSIS — E03.4 HYPOTHYROIDISM DUE TO ACQUIRED ATROPHY OF THYROID: ICD-10-CM

## 2022-02-21 DIAGNOSIS — R73.9 HYPERGLYCEMIA: ICD-10-CM

## 2022-02-21 DIAGNOSIS — I10 PRIMARY HYPERTENSION: ICD-10-CM

## 2022-02-21 LAB
A/G RATIO: 1.8 (ref 1.1–2.2)
ALBUMIN SERPL-MCNC: 4.2 G/DL (ref 3.4–5)
ALP BLD-CCNC: 63 U/L (ref 40–129)
ALT SERPL-CCNC: 13 U/L (ref 10–40)
ANION GAP SERPL CALCULATED.3IONS-SCNC: 15 MMOL/L (ref 3–16)
AST SERPL-CCNC: 20 U/L (ref 15–37)
BILIRUB SERPL-MCNC: 0.7 MG/DL (ref 0–1)
BUN BLDV-MCNC: 14 MG/DL (ref 7–20)
CALCIUM SERPL-MCNC: 9.2 MG/DL (ref 8.3–10.6)
CHLORIDE BLD-SCNC: 103 MMOL/L (ref 99–110)
CHOLESTEROL, FASTING: 158 MG/DL (ref 0–199)
CO2: 25 MMOL/L (ref 21–32)
CREAT SERPL-MCNC: 0.8 MG/DL (ref 0.6–1.2)
GFR AFRICAN AMERICAN: >60
GFR NON-AFRICAN AMERICAN: >60
GLUCOSE FASTING: 104 MG/DL (ref 70–99)
HDLC SERPL-MCNC: 42 MG/DL (ref 40–60)
LDL CHOLESTEROL CALCULATED: 97 MG/DL
POTASSIUM SERPL-SCNC: 4 MMOL/L (ref 3.5–5.1)
SODIUM BLD-SCNC: 143 MMOL/L (ref 136–145)
TOTAL PROTEIN: 6.6 G/DL (ref 6.4–8.2)
TRIGLYCERIDE, FASTING: 93 MG/DL (ref 0–150)
TSH SERPL DL<=0.05 MIU/L-ACNC: 1.69 UIU/ML (ref 0.27–4.2)
VLDLC SERPL CALC-MCNC: 19 MG/DL

## 2022-02-21 PROCEDURE — 36415 COLL VENOUS BLD VENIPUNCTURE: CPT

## 2022-02-21 PROCEDURE — 84443 ASSAY THYROID STIM HORMONE: CPT

## 2022-02-21 PROCEDURE — 83036 HEMOGLOBIN GLYCOSYLATED A1C: CPT

## 2022-02-21 PROCEDURE — 97113 AQUATIC THERAPY/EXERCISES: CPT

## 2022-02-21 PROCEDURE — 97150 GROUP THERAPEUTIC PROCEDURES: CPT

## 2022-02-21 PROCEDURE — 80053 COMPREHEN METABOLIC PANEL: CPT

## 2022-02-21 PROCEDURE — 80061 LIPID PANEL: CPT

## 2022-02-21 NOTE — FLOWSHEET NOTE
[address] [facility] Physical Therapy  Phone: [phone]   Fax: [phone]      Physical Therapy Daily Treatment Note  Date:  2022    Patient Name:  Earnest Austin    :  1950  MRN: [id number]  Medical/Treatment Diagnosis Information:  · Diagnosis: Chronic pain of left knee M25.562, G89.29  · Treatment Diagnosis: Decreased standing/ambulation tolerance, difficulty with stairs, squatting, decreased functional activity tolerance  Insurance/Certification information:  PT Insurance Information: Medicare  Physician Information:  Referring Practitioner: Raquel Fernando  Plan of care signed (Y/N): []  Yes [x]  No     Date of Patient follow up with Physician:      Progress Report: []  Yes  [x]  No     Date Range for reporting period:  Beginnin/16   Ending:      Progress report due (10 Rx/or 30 days whichever is less): visit #  or      Recertification due (POC duration/ or 90 days whichever is less): visit #    Visit # Insurance Allowable Auth required? Date Range   3/10   [place] []  Yes  []  No            Latex Allergy:  [x]NO      []YES  Preferred Language for Healthcare:   [x]English       []other:    Functional Scale:        Date assessed:  LEFS: raw score = 33/80; dysfunction = %    LEFS: 23; 60-79%      22    Pain level:  8/10 left knee     SUBJECTIVE:   Pt sore in L quad after last session, but reports the water initially helped the pain.       OBJECTIVE: See eval      RESTRICTIONS/PRECAUTIONS:     Exercises/Interventions:     Therapeutic Exercises (82543) Resistance / level Sets/sec Reps Notes   Nustep   X 4 mins            IB, HR/TR  30\"  X 2   2 x 10     HS/HF Stretch              Step Ups Fwd 6\"  X 15 B    Step Ups Lat 6\"  X 15 B           SLR Flex  2 X 10 L    Clamshells       Quad Set  5\"  X 15           T.G. Squats with ball squeeze   X 15     Leg Press                     Therapeutic Activities (46081)              Mini Squats       Lateral Band Walk                     Neuromuscular Re-ed (80287)              3-way hip, band     Hip abd is part of HEP           Tandem balance Airex  30\"  x 2 B    SLS  20\"  X 2 B    Airex Hip Abduction   X 15 B                  Shuttle               Manual Intervention (21933)                                                   AquaticTherapy Dates of Service:  2/21  Aquatic Visits Exercises/Activities:   Transfers:  [x] Stairs   [] Ramp  [] Chair Lift   % Immersion:            Ambulation/ Warm up:   UE Exercises: Forward  x2 Shoulder Shrugs      Lateral   x2 Shoulder Circles      Retro  x2 Scapular Retraction      [place]as    Push Downs      Heel/Toe Walking    Punching       Rowing       Elbow Flex/Ext       Shldr Flex/Ext       [employer] aBd/aDd    LE Exercises:  Shldr Horiz aBd/aDd    HR/TR x15 Shldr IR/ER    Marches 2 x10 Arm Circles    Squats  x PNF Diagonals    Hamstring Curls x15 Wall Push Ups    Hip Flexion (SLR)      Hip aBduction (SLR) x15     Hip Extension (SLR) x15      Hip aDduction (SLR)      Hip Circles x Functional:    Hip IR/Er  Step up forward x10 (Up L 1st. Down L 1st)   Hip Hikes x Step up lateral  x     Step down        Lunges Forward      Lunges Retro      Lunges Lateral     Balance:        SLS  2x15\"      Tandem Stance X 30\"      NBOS eyes open 2x30\" Seated:     NBOS eyes closed 2x30\" Ankle pumps     Hand to Opposite Knee x Ankle Circles     Fwd Step ups to SLS  Knee Flex/Ext x10   Lateral Step ups to SLS  Hip aBd/aDd    Stop/Go Gait   Bicycle       Ankle DF/PF      Ankle Inv/Ev    Stretching:       Gastroc/Soleus - wall      Hamstring - (seated) 2x30\" Deep Water:    Knee Flex Stretch  Jog    Piriformis  x Noodle Hang x   Hip Flexor x Traction at Wall    SKTC x     DKTC  x     ITB x Cool Down:    Quad  Fwd Walking x   Mid Back   Lat Walking x   UT  Retro Walking    Post Shoulder  Noodle float x   Ladder Pull      Pec Stretch            Core:   Other:    TrA set x     Pelvic Tilts x     Multifidi Walk outs c paddle x     PNF Chop/Lifts                          Aquatic Abbreviation Key  B= Belt DB= Dumbells T= Theratube   H= Hydrotone N= Noodles W= Weights   P= Paddles S= Speedo equipment K= Kickboard              Modalities: 12/30  CP to left knee x 10 mins    Pt. Education:  - Gave pt tour of pool, explained how to use lockers, what to wear, that it would be in group setting, and showed pt aquatic equipment.  -patient educated on diagnosis, prognosis and expectations for rehab  -all patient questions were answered    Home Exercise Program:      Access Code: [email or web address]  URL: [employer]/  Date: 11/24/2021  Prepared by: Rafael Hanley     Exercises  Supine Quad Set - 2 x daily - 7 x weekly - 1-2 sets - 10 reps - 5 secs hold  Supine Active Straight Leg Raise - 2 x daily - 7 x weekly - 2-3 sets - 10 reps  Supine Heel Slide - 2 x daily - 7 x weekly - 2-3 sets - 10 reps  Clamshell with Resistance - 2 x daily - 7 x weekly - 2-3 sets - 10 reps  Side Stepping with Resistance at Ankles - 2 x daily - 7 x weekly - 2-3 sets - 10 reps  Standing Hip Abduction with Resistance at Ankles and Counter Support - 2 x daily - 7 x weekly - 2-3 sets - 10 reps    Therapeutic Exercise and NMR EXR  [] (02520) Provided verbal/tactile cueing for activities related to strengthening, flexibility, endurance, ROM for improvements in  [x] LE / Lumbar: LE, proximal hip, and core control with self care, mobility, lifting, ambulation. [] UE / Cervical: cervical, postural, scapular, scapulothoracic and UE control with self care, reaching, carrying, lifting, house/yardwork, driving, computer work.  [] (82705) Provided verbal/tactile cueing for activities related to improving balance, coordination, kinesthetic sense, posture, motor skill, proprioception to assist with   [x] LE / lumbar: LE, proximal hip, and core control in self care, mobility, lifting, ambulation and eccentric single leg control.    [] UE / cervical: cervical, scapular, scapulothoracic and UE control with self care, reaching, carrying, lifting, house/yardwork, driving, computer work.   [] (38499) Therapist is in constant attendance of 2 or more patients providing skilled therapy interventions, but not providing any significant amount of measurable one-on-one time to either patient, for improvements in  [] LE / lumbar: LE, proximal hip, and core control in self care, mobility, lifting, ambulation and eccentric single leg control. [] UE / cervical: cervical, scapular, scapulothoracic and UE control with self care, reaching, carrying, lifting, house/yardwork, driving, computer work. [x] (90129) Aquatic therapy with therapeutic exercise. Provided verbal and tactile cueing for activities related to strengthening, flexibility, endurance, ROM for improvements in  [x] LE / lumbar: LE, proximal hip, and core control in self care, mobility, lifting, ambulation and eccentric single leg control. [] UE / cervical: cervical, scapular, scapulothoracic and UE control with self care, reaching, carrying, lifting, house/yardwork, driving, computer work. [x] (52089) Therapist is in constant attendance of 2 or more patients providing skilled therapy interventions, but not providing any significant amount of measurable one-on-one time to either patient, for improvements in  [x] LE / lumbar: LE, proximal hip, and core control in self care, mobility, lifting, ambulation and eccentric single leg control. [] UE / cervical: cervical, scapular, scapulothoracic and UE control with self care, reaching, carrying, lifting, house/yardwork, driving, computer work.        NMR and Therapeutic Activities:    [] (18017 or 89444) Provided verbal/tactile cueing for activities related to improving balance, coordination, kinesthetic sense, posture, motor skill, proprioception and motor activation to allow for proper function of   [] LE: / Lumbar core, proximal hip and LE with self care and ADLs  [] UE / Cervical: cervical, postural, scapular, scapulothoracic and UE control with self care, carrying, lifting, driving, computer work.   [] (94721) Gait Re-education- Provided training and instruction to the patient for proper LE, core and proximal hip recruitment and positioning and eccentric body weight control with ambulation re-education including up and down stairs     Home Management Training / Self Care:  [] (94299) Provided self-care/home management training related to activities of daily living and compensatory training, and/or use of adaptive equipment for improvement with: ADLs and compensatory training, meal preparation, safety procedures and instruction in use of adaptive equipment, including bathing, grooming, dressing, personal hygiene, basic household cleaning and chores.      Home Exercise Program:    [x] (62472) Reviewed/Progressed HEP activities related to strengthening, flexibility, endurance, ROM of   [] LE / Lumbar: core, proximal hip and LE for functional self-care, mobility, lifting and ambulation/stair navigation   [] UE / Cervical: cervical, postural, scapular, scapulothoracic and UE control with self care, reaching, carrying, lifting, house/yardwork, driving, computer work  [] (35404)Reviewed/Progressed HEP activities related to improving balance, coordination, kinesthetic sense, posture, motor skill, proprioception of   [] LE: core, proximal hip and LE for self care, mobility, lifting, and ambulation/stair navigation    [] UE / Cervical: cervical, postural,  scapular, scapulothoracic and UE control with self care, reaching, carrying, lifting, house/yardwork, driving, computer work    Manual Treatments:  PROM / STM / Oscillations-Mobs:  G-I, II, III, IV (PA's, Inf., Post.)  [] (87969) Provided manual therapy to mobilize LE, proximal hip and/or LS spine soft tissue/joints for the purpose of modulating pain, promoting relaxation,  increasing ROM, reducing/eliminating soft tissue swelling/inflammation/restriction, improving soft tissue extensibility and allowing for proper ROM for normal function with   [] LE / lumbar: self care, mobility, lifting and ambulation. [] UE / Cervical: self care, reaching, carrying, lifting, house/yardwork, driving, computer work. Modalities:  [] (93330) Vasopneumatic compression: Utilized vasopneumatic compression to decrease edema / swelling for the purpose of improving mobility and quad tone / recruitment which will allow for increased overall function including but not limited to self-care, transfers, ambulation, and ascending / descending stairs. Charges:  Timed Code Treatment Minutes: 15   Total Treatment Minutes: 38     [] EVAL - LOW (06504)   [] EVAL - MOD (20287)  [] EVAL - HIGH (64715)  [] RE-EVAL (83668)  [] RG(73861) x        [] Ionto  [] NMR (05679) x       [] Vaso  [] Manual (95356) x       [] Ultrasound  [] TA x        [] Mech Traction (70049)  [x] Aquatic Therapy x 1    [] ES (un) (48919):   [] Home Management Training x  [] ES(attended) (09034)   [] Dry Needling 1-2 muscles (11904):  [] Dry Needling 3+ muscles (602289)  [x] Group: 1     [] Other:     GOALS:     Patient stated goal: learn new exercises to maintain, help with pain relief, go up/down steps better, walk without feeling of instability and not use a cane at all.   []? Progressing: []? Met: []? Not Met: []? Adjusted     Therapist goals for Patient:   Short Term Goals: To be achieved in: 2 weeks  1. Independent in HEP and progression per patient tolerance, in order to prevent re-injury. []? Progressing: []? Met: []? Not Met: []? Adjusted  2. Patient will have a decrease in pain to <= 3/10 in left knee and right leg to  facilitate improvement in movement, function, and ADLs as indicated by Functional Deficits. []? Progressing: []? Met: []? Not Met: []? Adjusted     Long Term Goals: To be achieved in:  5 weeks or discharge   1.  Disability index score of 20% or less for the LEFS to assist with reaching prior level of function. []? Progressing: []? Met: []? Not Met: []? Adjusted  2. Patient will demonstrate increased AROM to 120* left knee flexion to allow for proper joint functioning as indicated by patients Functional Deficits. []? Progressing: []? Met: []? Not Met: []? Adjusted  3. Patient will demonstrate an increase in Strength to at least  4+/5 in bilateral LE as well as good proximal hip strength and control to allow for proper functional mobility as indicated by patients Functional Deficits. []? Progressing: []? Met: []? Not Met: []? Adjusted  4. Patient will return to functional activities including  Ascending/descending steps in a step-to pattern without increased symptoms or restriction. []? Progressing: []? Met: []? Not Met: []? Adjusted  5. Patient will demonstrate the ability to ambulate without evidence of antalgia and normal step/stride length without A.D. in order to resume her prior functional activities without increased symptoms or restriction  []? Progressing: []? Met: []? Not Met: []? Adjusted      Overall Progression Towards Functional goals/ Treatment Progress Update:  [x] Patient is progressing as expected towards functional goals listed. [] Progression is slowed due to complexities/Impairments listed. [] Progression has been slowed due to co-morbidities. [] Plan just implemented, too soon to assess goals progression <30days   [] Goals require adjustment due to lack of progress  [] Patient is not progressing as expected and requires additional follow up with physician  [] Other    Persisting Functional Limitations/Impairments:  []Sleeping []Sitting               [x]Standing []Transfers        [x]Walking []Kneeling               [x]Stairs [x]Squatting / bending   []ADLs [x]Reaching  []Lifting  []Housework  []Driving []Job related tasks  []Sports/Recreation []Other:        ASSESSMENT:  Pt performed aquatic exercises without pain.  Pt apprehensive to trial new exercises, especially those that challenge balance, but able to complete them with encouragement. Pt guarded with posture and will benefit from further therapy to improve mobility, strength, and joint stability for decreased pain and improved function. Treatment/Activity Tolerance:  [] Patient able to complete tx  [] Patient limited by fatigue  [x] Patient limited by pain  [x] Patient limited by other medical complications  [] Other:     Prognosis: [x] Good [x] Fair  [] Poor    Patient Requires Follow-up: [x] Yes  [] No    Plan for next treatment session:  1 aquatic and 1 land session per week to work on left knee as well as exacerbation of right leg/low back deficits that will impact tolerance to left knee and strengthening. PLAN: See eval. PT 2x/week x 5 weeks to include both land and aquatic therapy   [x] Continue per plan of care [] Alter current plan (see comments)  [] Plan of care initiated [] Hold pending MD visit [] Discharge    Electronically signed by: Wilian Mccurdy PT DPCORI Braun SPT    Note: If patient does not return for scheduled/ recommended follow up visits, this note will serve as a discharge from care along with most recent update on progress.

## 2022-02-22 ENCOUNTER — OFFICE VISIT (OUTPATIENT)
Dept: INTERNAL MEDICINE CLINIC | Age: 72
End: 2022-02-22
Payer: MEDICARE

## 2022-02-22 VITALS
OXYGEN SATURATION: 100 % | DIASTOLIC BLOOD PRESSURE: 68 MMHG | SYSTOLIC BLOOD PRESSURE: 122 MMHG | WEIGHT: 193.4 LBS | RESPIRATION RATE: 16 BRPM | HEART RATE: 76 BPM | HEIGHT: 67 IN | BODY MASS INDEX: 30.35 KG/M2

## 2022-02-22 DIAGNOSIS — F51.04 PSYCHOPHYSIOLOGICAL INSOMNIA: ICD-10-CM

## 2022-02-22 DIAGNOSIS — Z00.00 MEDICARE ANNUAL WELLNESS VISIT, SUBSEQUENT: Primary | ICD-10-CM

## 2022-02-22 LAB
ESTIMATED AVERAGE GLUCOSE: 122.6 MG/DL
HBA1C MFR BLD: 5.9 %

## 2022-02-22 PROCEDURE — 1123F ACP DISCUSS/DSCN MKR DOCD: CPT | Performed by: INTERNAL MEDICINE

## 2022-02-22 PROCEDURE — G8484 FLU IMMUNIZE NO ADMIN: HCPCS | Performed by: INTERNAL MEDICINE

## 2022-02-22 PROCEDURE — 3017F COLORECTAL CA SCREEN DOC REV: CPT | Performed by: INTERNAL MEDICINE

## 2022-02-22 PROCEDURE — 4040F PNEUMOC VAC/ADMIN/RCVD: CPT | Performed by: INTERNAL MEDICINE

## 2022-02-22 PROCEDURE — G0439 PPPS, SUBSEQ VISIT: HCPCS | Performed by: INTERNAL MEDICINE

## 2022-02-22 RX ORDER — ZOLPIDEM TARTRATE 5 MG/1
5 TABLET ORAL NIGHTLY PRN
COMMUNITY
End: 2022-02-22 | Stop reason: SDUPTHER

## 2022-02-22 RX ORDER — ZOLPIDEM TARTRATE 5 MG/1
5 TABLET ORAL NIGHTLY PRN
Qty: 30 TABLET | Refills: 0 | Status: SHIPPED | OUTPATIENT
Start: 2022-02-22 | End: 2022-03-24

## 2022-02-22 ASSESSMENT — PATIENT HEALTH QUESTIONNAIRE - PHQ9
SUM OF ALL RESPONSES TO PHQ QUESTIONS 1-9: 2
SUM OF ALL RESPONSES TO PHQ QUESTIONS 1-9: 2
SUM OF ALL RESPONSES TO PHQ9 QUESTIONS 1 & 2: 2
SUM OF ALL RESPONSES TO PHQ QUESTIONS 1-9: 2
SUM OF ALL RESPONSES TO PHQ QUESTIONS 1-9: 2
1. LITTLE INTEREST OR PLEASURE IN DOING THINGS: 1
2. FEELING DOWN, DEPRESSED OR HOPELESS: 1

## 2022-02-22 ASSESSMENT — LIFESTYLE VARIABLES
HOW MANY STANDARD DRINKS CONTAINING ALCOHOL DO YOU HAVE ON A TYPICAL DAY: 1 OR 2
HOW OFTEN DO YOU HAVE A DRINK CONTAINING ALCOHOL: MONTHLY OR LESS

## 2022-02-22 NOTE — PROGRESS NOTES
Medicare Annual Wellness Visit    Kirt Max is here for Medicare AWV      ASSESSMENT/PLAN:  Recommendations for Preventive Services Due: see orders and patient instructions/AVS.  Recommended screening schedule for the next 5-10 years is provided to the patient in written form: see Patient Instructions/AVS.    Medicare annual wellness visit, subsequent  -     Ambulatory Referral to [facility] Clinical Specialist  -     Mammogram UTD- will call for report  -     Flu vaccine declined  Psychophysiological insomnia  Assessment & Plan:  Doing well on occasional Ambien- risks and benefits of this medication discussed. Orders:  -     zolpidem (AMBIEN) 5 MG tablet; Take 1 tablet by mouth nightly as needed for Sleep for up to 30 days. , Disp-30 tablet, R-0Normal       Return in about 6 months (around 8/22/2022) for 30 MIN F/U.    SUBJECTIVE:        No Known Allergies  Prior to Visit Medications    Medication Sig Taking? Authorizing Provider   zolpidem (AMBIEN) 5 MG tablet Take 1 tablet by mouth nightly as needed for Sleep for up to 30 days. Yes Alex Knox MD   losartan (COZAAR) 50 MG tablet TAKE 1 TABLET BY MOUTH  DAILY Yes Alex Knox MD   levothyroxine (SYNTHROID) 150 MCG tablet Take 1 tablet by mouth Daily Yes Alex Knox MD   simvastatin (ZOCOR) 40 MG tablet TAKE 1 TABLET BY MOUTH AT  NIGHT Yes Alex Knox MD   gabapentin (NEURONTIN) 300 MG capsule TAKE 1 TO 2 CAPSULES BY MOUTH THREE TIMES DAILY Yes Historical Provider, MD   hydroCHLOROthiazide (HYDRODIURIL) 25 MG tablet TAKE 1 TABLET BY MOUTH  DAILY Yes Porsche Yeh DO   ibuprofen (ADVIL;MOTRIN) 800 MG tablet Take 1 tablet by mouth every 6 hours as needed for Pain Take with food.  Yes Alex Knox MD   Omega-3 Fatty Acids (FISH OIL) 1000 MG CAPS Take 1,000 mg by mouth daily Yes Historical Provider, MD   loratadine (CLARITIN) 10 MG capsule Take 10 mg by mouth daily Yes Historical Provider, MD   ascorbic acid (VITAMIN C) 500 MG tablet Take 4,000 mg by mouth daily Yes Historical Provider, MD   mupirocin (BACTROBAN) 2 % ointment Apply topically to affected area daily Yes Amy Schaeffer MD   methocarbamol (ROBAXIN) 500 MG tablet Take 750 mg by mouth 2 times daily as needed Yes Historical Provider, MD   vitamin B-12 (CYANOCOBALAMIN) 500 MCG tablet Take 500 mcg by mouth daily Yes Historical Provider, MD   Multiple Vitamins-Minerals (MULTIVITAMIN ADULT PO) Take by mouth Yes Historical Provider, MD   fluticasone (FLONASE) 50 MCG/ACT nasal spray 1 spray by Nasal route daily Yes Cipriano Mckinnon MD   escitalopram (LEXAPRO) 10 MG tablet Take 1 tablet by mouth daily  Patient taking differently: Take 15 mg by mouth daily  Yes Amy Schaeffer MD   Cholecalciferol (VITAMIN D) 2000 UNITS CAPS capsule Take 1 capsule by mouth daily Yes Historical Provider, MD   Probiotic Product (PROBIOTIC ADVANCED PO) Take by mouth Yes Historical Provider, MD     Patient's complete Health Risk Assessment and screening values have been reviewed and are found in Flowsheets. The following problems were reviewed today and where indicated follow up appointments were made and/or referrals ordered. Positive Risk Factor Screenings with Interventions:               General Health and ACP:  General  In general, how would you say your health is?: Fair  In the past 7 days, have you experienced any of the following: New or Increased Pain, New or Increased Fatigue, Loneliness, Social Isolation, Stress or Anger?: (!) Yes  Select all that apply: (!) Stress  Do you get the social and emotional support that you need?: Yes  Do you have a Living Will?: (!) No    Advance Directives     Power of  Living Will ACP-Advance Directive ACP-Power of     Not on File Not on File Not on File Not on File      General Health Risk Interventions:  · Stress: Due to increased LBP- has MISAEL scheduled for tomorrow. Continue current dose of Lexapro.   · No Living Will: Patient referred to [facility] Clinical Specialist    Health Habits/Nutrition:     Physical Activity: Insufficiently Active    Days of Exercise per Week: 3 days    Minutes of Exercise per Session: 30 min     Have you lost any weight without trying in the past 3 months?: No    Body mass index: (!) 30.29    Have you seen the dentist within the past year?: Yes      Health Habits/Nutrition Interventions:  · Nutritional issues:  Slightly worse due to pain issues leading to more snacking and less activity- will resume prior regimen after pain is under control      ADLs:  In the past 7 days, did you need help from others to perform any of the following everyday activities: Eating, dressing, grooming, bathing, toileting, or walking/balance?: (!) Yes  Select all that apply: (!) Walking/Balance  In the past 7 days, did you need help from others to take care of any of the following: Laundry, housekeeping, banking/finances, shopping, telephone use, food preparation, transportation, or taking medications?: (!) Yes  Select all that apply: (!) Housekeeping    ADL Interventions:  · Has help that she needs- only needs help when back pain flares      OBJECTIVE:        Vitals:    02/22/22 1503   BP: 122/68   Pulse: 76   Resp: 16   SpO2: 100%   Weight: 193 lb 6.4 oz (87.7 kg)   Height: 5' 7\" (1.702 m)     BP Readings from Last 3 Encounters:   02/22/22 122/68   06/17/21 112/72   07/23/20 110/76     Physical Exam  Constitutional:       General: She is not in acute distress. Appearance: She is well-developed. Eyes:      Conjunctiva/sclera: Conjunctivae normal.   Neck:      Thyroid: No thyroid mass or thyromegaly. Cardiovascular:      Rate and Rhythm: Normal rate and regular rhythm. Heart sounds: Normal heart sounds. No murmur heard. No friction rub. No gallop. Pulmonary:      Effort: Pulmonary effort is normal. No respiratory distress. Breath sounds: Normal breath sounds. No wheezing, rhonchi or rales. Musculoskeletal:      Right lower leg: No edema. Left lower leg: No edema. Comments: Spine exam per pain specialist   Lymphadenopathy:      Cervical: No cervical adenopathy. Skin:     General: Skin is warm and dry. Findings: No erythema or rash. Neurological:      Mental Status: She is alert and oriented to person, place, and time. Psychiatric:         Speech: Speech normal.         Behavior: Behavior normal.         Thought Content:  Thought content normal.         Judgment: Judgment normal.       CareTeam (Including outside providers/suppliers regularly involved in providing care):   Patient Care Team:  Rhys Tomlinson MD as PCP - General (Internal Medicine)  Rhys Tomlinson MD as PCP - [facility]  Libertad Allison MD as Consulting Physician (Gastroenterology)  Faye Bardales MD (Neurology)  Nela Giron MD as Consulting Physician (Otolaryngology)  Franko Samuel MD as Consulting Physician (Physical Medicine and Rehab)               Reviewed and updated this visit by clinical staff:  Tobacco  Allergies  Meds  Problems  Med Hx  Surg Hx  Soc Hx  Fam Hx

## 2022-02-22 NOTE — PATIENT INSTRUCTIONS
Personalized Preventive Plan for Huseyin Joy - 2/22/2022  Medicare offers a range of preventive health benefits. Some of the tests and screenings are paid in full while other may be subject to a deductible, co-insurance, and/or copay. Some of these benefits include a comprehensive review of your medical history including lifestyle, illnesses that may run in your family, and various assessments and screenings as appropriate. After reviewing your medical record and screening and assessments performed today your provider may have ordered immunizations, labs, imaging, and/or referrals for you. A list of these orders (if applicable) as well as your Preventive Care list are included within your After Visit Summary for your review. Other Preventive Recommendations:    · A preventive eye exam performed by an eye specialist is recommended every 1-2 years to screen for glaucoma; cataracts, macular degeneration, and other eye disorders. · A preventive dental visit is recommended every 6 months. · Try to get at least 150 minutes of exercise per week or 10,000 steps per day on a pedometer . · Order or download the FREE \"Exercise & Physical Activity: Your Everyday Guide\" from [employer]. Call 9-750.819.7680 or search [employer] online. · You need 6565-4386 mg of calcium and 8761-7442 IU of vitamin D per day. It is possible to meet your calcium requirement with diet alone, but a vitamin D supplement is usually necessary to meet this goal.  · When exposed to the sun, use a sunscreen that protects against both UVA and UVB radiation with an SPF of 30 or greater. Reapply every 2 to 3 hours or after sweating, drying off with a towel, or swimming. · Always wear a seat belt when traveling in a car. Always wear a helmet when riding a bicycle or motorcycle.

## 2022-02-23 ENCOUNTER — CLINICAL DOCUMENTATION (OUTPATIENT)
Dept: SPIRITUAL SERVICES | Age: 72
End: 2022-02-23

## 2022-02-23 NOTE — ACP (ADVANCE CARE PLANNING)
Advance Care Planning   Ambulatory ACP Specialist Patient Outreach    Date:  2/23/2022  ACP Specialist:  Bhanu Chester    Outreach call to patient in follow-up to ACP Specialist referral from: Sweta Camarena MD    [x] PCP  [] Provider   [] Ambulatory Care Management [] Other for Reason:    [x] Advance Directive Assistance  [] Code Status Discussion  [] Complete Portable DNR Order  [] Discuss Goals of Care  [] Complete POST/MOST  [] Early ACP Decision-Making  [] Other    Date Referral Received: 2/22/2022    Today's Outreach:  [x] First   [] Second  [] Third                               Third outreach made by []  phone  [] email []   "[employer]     Intervention:  [x] Spoke with Patient  [] Left VM requesting return call      Outcome: Patient requested callback tomorrow afternoon. Next Step:   [] ACP scheduled conversation  [x] Outreach again in one day            [] Email / Mail [address]  [] Email / Mail Advance Directive            [] Close Referral. Routing closure to referring provider/staff and to ACP Specialist .      Thank you for this referral.

## 2022-02-24 ENCOUNTER — HOSPITAL ENCOUNTER (OUTPATIENT)
Dept: PHYSICAL THERAPY | Age: 72
Setting detail: THERAPIES SERIES
Discharge: HOME | End: 2022-02-24
Payer: MEDICARE

## 2022-02-24 PROCEDURE — 97110 THERAPEUTIC EXERCISES: CPT

## 2022-02-24 PROCEDURE — 97530 THERAPEUTIC ACTIVITIES: CPT
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
## 2022-02-28 ENCOUNTER — HOSPITAL ENCOUNTER (OUTPATIENT)
Dept: PHYSICAL THERAPY | Age: 72
Setting detail: THERAPIES SERIES
Discharge: HOME | End: 2022-02-28
Payer: MEDICARE

## 2022-02-28 NOTE — FLOWSHEET NOTE
[address] [facility]    Physical Therapy  Cancellation/No-show Note  Patient Name:  Brooks Estimable  :  1950   Date:  2022    Cancelled visits to date: 2  No-shows to date: 1    For today's appointment patient:  []  [address] [place] ,   []  Rescheduled appointment  [x]  No-show    Reason given by patient:  []  Patient ill  []  Conflicting appointment  []  No transportation    []  Conflict with work  [x]  No reason given  []  Other:     Comments:  Pat    Phone call information:   []  Phone call made today to patient at _ time at number provided:      []  Patient answered, conversation as follows:    []  Patient did not answer, message left as follows:  [x]  Phone call not made today  []  Phone call not needed - pt contacted us to cancel and provided reason for cancellation.      Electronically signed by:  Ciaran Larios, PT, PT

## 2022-03-01 DIAGNOSIS — J01.90 ACUTE NON-RECURRENT SINUSITIS, UNSPECIFIED LOCATION: ICD-10-CM

## 2022-03-02 ENCOUNTER — HOSPITAL ENCOUNTER (OUTPATIENT)
Dept: PHYSICAL THERAPY | Age: 72
Setting detail: THERAPIES SERIES
Discharge: HOME | End: 2022-03-02
Payer: MEDICARE

## 2022-03-02 RX ORDER — FLUCONAZOLE 150 MG/1
TABLET ORAL
Qty: 2 TABLET | Refills: 0 | Status: SHIPPED | OUTPATIENT
Start: 2022-03-02 | End: 2022-06-25

## 2022-03-02 NOTE — FLOWSHEET NOTE
[address] [facility]    Physical Therapy  Cancellation/No-show Note  Patient Name:  Enriqueta Kimball  :  1950   Date:  3/2/2022    Cancelled visits to date: 2  No-shows to date: 2    For today's appointment patient:  []  [facility] ,   []  Rescheduled appointment  [x]  No-show , 3/2    Reason given by patient:  []  Patient ill  []  Conflicting appointment  []  No transportation    []  Conflict with work  [x]  No reason given  []  Other:     Comments:      Phone call information:   [x]  Phone call made today to patient at 11:16 am at number provided: Mobile phone    [x]  Patient answered, conversation as follows: Said she contacted our office yesterday morning to cancel today's appt, said that Dr. Daniel Byrne told her to take a week off from PT after the steroid injection and that she would start back up next week as long as she's feeling better. []  Patient did not answer, message left as follows:  []  Phone call not made today  []  Phone call not needed - pt contacted us to cancel and provided reason for cancellation.      Electronically signed by:  Tony Sheridan, PT, PT

## 2022-03-07 ENCOUNTER — HOSPITAL ENCOUNTER (OUTPATIENT)
Dept: PHYSICAL THERAPY | Age: 72
Setting detail: THERAPIES SERIES
Discharge: HOME | End: 2022-03-07
Payer: MEDICARE

## 2022-03-07 PROCEDURE — 97113 AQUATIC THERAPY/EXERCISES: CPT

## 2022-03-07 PROCEDURE — 97150 GROUP THERAPEUTIC PROCEDURES: CPT

## 2022-03-07 NOTE — FLOWSHEET NOTE
[address] [facility]  Phone: [phone]   Fax: [phone]      Physical Therapy Daily Treatment Note  Date:  3/7/2022    Patient Name:  Jeanna Osullivan    :  1950  MRN: [id number]  Medical/Treatment Diagnosis Information:  · Diagnosis: Chronic pain of left knee M25.562, G89.29  · Treatment Diagnosis: Decreased standing/ambulation tolerance, difficulty with stairs, squatting, decreased functional activity tolerance  Insurance/Certification information:  PT Insurance Information: Medicare  Physician Information:  Referring Practitioner: Amy Schaeffer  Plan of care signed (Y/N): []  Yes [x]  No     Date of Patient follow up with Physician:      Progress Report: []  Yes  [x]  No     Date Range for reporting period:   Beginnin/16   Ending:      Progress report due (10 Rx/or 30 days whichever is less): visit #  or      Recertification due (POC duration/ or 90 days whichever is less): visit #    Visit # Insurance Allowable Auth required? Date Range   5/10   [place] []  Yes  []  No            Latex Allergy:  [x]NO      []YES  Preferred Language for Healthcare:   [x]English       []other:    Functional Scale:        Date assessed:  LEFS: raw score = 33/80; dysfunction = %    LEFS: 23; 60-79%      22    Pain level:  5/10 left knee     SUBJECTIVE:   Pt not using cane today which she is happy about. Some R leg pain.        OBJECTIVE: See eval      RESTRICTIONS/PRECAUTIONS:     Exercises/Interventions:     Therapeutic Exercises (84925) Resistance / level Sets/sec Reps Notes   Nustep   X 4 mins            IB, HR/TR  30\"  X 2   2 x 10     HS/HF Stretch       Prone Knee Extension  1 X 10 B : pt enjoys this, felt a stretch on LLE   Step Ups Fwd : cont npv   Step Ups Lat : cont npv   LAQ 3# 2 X 10 L    SLR Flex  2 X 10 L    Clamshells  1 X 15 B    Quad Set  5\" / 2 X 10           T.G. Squats with ball squeeze   X 15     Leg Press Therapeutic Activities (00991)       TrA: seated hip ADD ball squeeze w/ GH DB flex 2# 2/5\" H X 10 2/24   Sit<>stands   X 10 2/24: from mat table, cues for upright posture once standing   Lateral Band Walk                     Neuromuscular Re-ed (38616)              3-way hip, band     Hip abd is part of HEP           Tandem balance Airex     SLS     Airex Hip Abduction                     Shuttle               Manual Intervention (77098)                                                   AquaticTherapy Dates of Service: 2/18, 2/21, 3/7  Aquatic Visits Exercises/Activities:   Transfers:  [x] Stairs   [] Ramp  [] Chair Lift   % Immersion:            Ambulation/ Warm up:   UE Exercises:       Forward  x2 Shoulder Shrugs      Lateral   x2 Shoulder Circles      Retro  X 2 Scapular Retraction      Cariocas    Push Downs      Heel/Toe Walking    Punching       Rowing       Elbow Flex/Ext       Shldr Flex/Ext       [employer] aBd/aDd    LE Exercises:  Shldr Horiz aBd/aDd    HR/TR x20 Shldr IR/ER    Marches x20 Arm Circles    Squats X20 PNF Diagonals    Hamstring Curls x20 Wall Push Ups    Hip Flexion (SLR) x20     Hip aBduction (SLR) x20     Hip Extension (SLR) x20      Hip aDduction (SLR)      Hip Circles x Functional:    Hip IR/Er  Step up forward x10 B   Hip Hikes x Step up lateral  x     Step down        Lunges Forward      Lunges Retro      Lunges Lateral     Balance:        SLS  2x15\"      Tandem Stance X 30\"      NBOS eyes open Seated:     NBOS eyes closed Ankle pumps     Hand to Opposite Knee x Ankle Circles     Fwd Step ups to SLS  Knee Flex/Ext x10   Lateral Step ups to SLS  Hip aBd/aDd    Stop/Go Gait   Bicycle       Ankle DF/PF      Ankle Inv/Ev    Stretching:       Gastroc/Soleus - wall      Hamstring - (seated) 2x30\" Deep Water:    Knee Flex Stretch  Jog    Piriformis  x Noodle Hang x   Hip Flexor x Traction at Wall    SKTC x     DKTC  x     ITB x Cool Down:    Quad  Fwd Walking x   Mid Back   Lat Walking x   UT  Retro Walking    Post Shoulder  Noodle float x   Ladder Pull      Pec Stretch            Core: Other:    TrA set x     Pelvic Tilts x     Multifidi Walk outs c paddle x     PNF Chop/Lifts                          Aquatic Abbreviation Key  B= Belt DB= Dumbells T= Theratube   H= Hydrotone N= Noodles W= Weights   P= Paddles S= Speedo equipment K= Kickboard              Modalities: 12/30  CP to left knee x 10 mins    Pt. Education:  - Gave pt tour of pool, explained how to use lockers, what to wear, that it would be in group setting, and showed pt aquatic equipment.  -patient educated on diagnosis, prognosis and expectations for rehab  -all patient questions were answered    Home Exercise Program:      Access Code: [email or web address]  URL: [employer]/  Date: 11/24/2021  Prepared by: Rafael Hanley     Exercises  Supine Quad Set - 2 x daily - 7 x weekly - 1-2 sets - 10 reps - 5 secs hold  Supine Active Straight Leg Raise - 2 x daily - 7 x weekly - 2-3 sets - 10 reps  Supine Heel Slide - 2 x daily - 7 x weekly - 2-3 sets - 10 reps  Clamshell with Resistance - 2 x daily - 7 x weekly - 2-3 sets - 10 reps  Side Stepping with Resistance at Ankles - 2 x daily - 7 x weekly - 2-3 sets - 10 reps  Standing Hip Abduction with Resistance at Ankles and Counter Support - 2 x daily - 7 x weekly - 2-3 sets - 10 reps    Therapeutic Exercise and NMR EXR  [] (09951) Provided verbal/tactile cueing for activities related to strengthening, flexibility, endurance, ROM for improvements in  [] LE / Lumbar: LE, proximal hip, and core control with self care, mobility, lifting, ambulation.   [] UE / Cervical: cervical, postural, scapular, scapulothoracic and UE control with self care, reaching, carrying, lifting, house/yardwork, driving, computer work.  [] (69442) Provided verbal/tactile cueing for activities related to improving balance, coordination, kinesthetic sense, posture, motor skill, proprioception to assist with [] LE / lumbar: LE, proximal hip, and core control in self care, mobility, lifting, ambulation and eccentric single leg control. [] UE / cervical: cervical, scapular, scapulothoracic and UE control with self care, reaching, carrying, lifting, house/yardwork, driving, computer work. [x] (38839) Aquatic therapy with therapeutic exercise. Provided verbal and tactile cueing for activities related to strengthening, flexibility, endurance, ROM for improvements in  [x] LE / lumbar: LE, proximal hip, and core control in self care, mobility, lifting, ambulation and eccentric single leg control. [] UE / cervical: cervical, scapular, scapulothoracic and UE control with self care, reaching, carrying, lifting, house/yardwork, driving, computer work. [x] (70677) Therapist is in constant attendance of 2 or more patients providing skilled therapy interventions, but not providing any significant amount of measurable one-on-one time to either patient, for improvements in  [x] LE / lumbar: LE, proximal hip, and core control in self care, mobility, lifting, ambulation and eccentric single leg control. [] UE / cervical: cervical, scapular, scapulothoracic and UE control with self care, reaching, carrying, lifting, house/yardwork, driving, computer work.        NMR and Therapeutic Activities:    [] (78270 or 77952) Provided verbal/tactile cueing for activities related to improving balance, coordination, kinesthetic sense, posture, motor skill, proprioception and motor activation to allow for proper function of   [] LE: / Lumbar core, proximal hip and LE with self care and ADLs  [] UE / Cervical: cervical, postural, scapular, scapulothoracic and UE control with self care, carrying, lifting, driving, computer work.   [] (98419) Gait Re-education- Provided training and instruction to the patient for proper LE, core and proximal hip recruitment and positioning and eccentric body weight control with ambulation re-education including up and down stairs     Home Management Training / Self Care:  [] (64827) Provided self-care/home management training related to activities of daily living and compensatory training, and/or use of adaptive equipment for improvement with: ADLs and compensatory training, meal preparation, safety procedures and instruction in use of adaptive equipment, including bathing, grooming, dressing, personal hygiene, basic household cleaning and chores. Home Exercise Program:    [] (78671) Reviewed/Progressed HEP activities related to strengthening, flexibility, endurance, ROM of   [] LE / Lumbar: core, proximal hip and LE for functional self-care, mobility, lifting and ambulation/stair navigation   [] UE / Cervical: cervical, postural, scapular, scapulothoracic and UE control with self care, reaching, carrying, lifting, house/yardwork, driving, computer work  [] (43872)Reviewed/Progressed HEP activities related to improving balance, coordination, kinesthetic sense, posture, motor skill, proprioception of   [] LE: core, proximal hip and LE for self care, mobility, lifting, and ambulation/stair navigation    [] UE / Cervical: cervical, postural,  scapular, scapulothoracic and UE control with self care, reaching, carrying, lifting, house/yardwork, driving, computer work    Manual Treatments:  PROM / STM / Oscillations-Mobs:  G-I, II, III, IV (PA's, Inf., Post.)  [] (82710) Provided manual therapy to mobilize LE, proximal hip and/or LS spine soft tissue/joints for the purpose of modulating pain, promoting relaxation,  increasing ROM, reducing/eliminating soft tissue swelling/inflammation/restriction, improving soft tissue extensibility and allowing for proper ROM for normal function with   [] LE / lumbar: self care, mobility, lifting and ambulation. [] UE / Cervical: self care, reaching, carrying, lifting, house/yardwork, driving, computer work.      Modalities:  [] (80691) Vasopneumatic compression: Utilized vasopneumatic compression to decrease edema / swelling for the purpose of improving mobility and quad tone / recruitment which will allow for increased overall function including but not limited to self-care, transfers, ambulation, and ascending / descending stairs.     [] (96537) Aquatic therapy with therapeutic exercise. Provided verbal and tactile cueing for activities related to strengthening, flexibility, endurance, ROM for improvements in  [] LE / lumbar: LE, proximal hip, and core control in self care, mobility, lifting, ambulation and eccentric single leg control. [] UE / cervical: cervical, scapular, scapulothoracic and UE control with self care, reaching, carrying, lifting, house/yardwork, driving, computer work.   [] (70830) Therapist is in constant attendance of 2 or more patients providing skilled therapy interventions, but not providing any significant amount of measurable one-on-one time to either patient, for improvements in  [] LE / lumbar: LE, proximal hip, and core control in self care, mobility, lifting, ambulation and eccentric single leg control. [] UE / cervical: cervical, scapular, scapulothoracic and UE control with self care, reaching, carrying, lifting, house/yardwork, driving, computer work. Charges:  Timed Code Treatment Minutes: 16   Total Treatment Minutes: 30     [] EVAL - LOW (97583)   [] EVAL - MOD (30128)  [] EVAL - HIGH (75659)  [] RE-EVAL (41787)  [] SF(28099) x        [] Ionto  [] NMR (21369) x       [] Vaso  [] Manual (78236) x       [] Ultrasound  [] TA x        [] Mech Traction (76944)  [x] Aquatic Therapy x 1    [] ES (un) (37506):   [] Home Management Training x  [] ES(attended) (45759)   [] Dry Needling 1-2 muscles (41192):  [] Dry Needling 3+ muscles (786905)  [x] Group: 1     [] Other:     GOALS:     Patient stated goal: learn new exercises to maintain, help with pain relief, go up/down steps better, walk without feeling of instability and not use a cane at all. []? Progressing: []? Met: []? Not Met: []? Adjusted     Therapist goals for Patient:   Short Term Goals: To be achieved in: 2 weeks  1. Independent in HEP and progression per patient tolerance, in order to prevent re-injury. []? Progressing: []? Met: []? Not Met: []? Adjusted  2. Patient will have a decrease in pain to <= 3/10 in left knee and right leg to  facilitate improvement in movement, function, and ADLs as indicated by Functional Deficits. []? Progressing: []? Met: []? Not Met: []? Adjusted     Long Term Goals: To be achieved in:  5 weeks or discharge   1. Disability index score of 20% or less for the LEFS to assist with reaching prior level of function. []? Progressing: []? Met: []? Not Met: []? Adjusted  2. Patient will demonstrate increased AROM to 120* left knee flexion to allow for proper joint functioning as indicated by patients Functional Deficits. []? Progressing: []? Met: []? Not Met: []? Adjusted  3. Patient will demonstrate an increase in Strength to at least  4+/5 in bilateral LE as well as good proximal hip strength and control to allow for proper functional mobility as indicated by patients Functional Deficits. []? Progressing: []? Met: []? Not Met: []? Adjusted  4. Patient will return to functional activities including  Ascending/descending steps in a step-to pattern without increased symptoms or restriction. []? Progressing: []? Met: []? Not Met: []? Adjusted  5. Patient will demonstrate the ability to ambulate without evidence of antalgia and normal step/stride length without A.D. in order to resume her prior functional activities without increased symptoms or restriction  []? Progressing: []? Met: []? Not Met: []? Adjusted      Overall Progression Towards Functional goals/ Treatment Progress Update:  [x] Patient is progressing as expected towards functional goals listed. [] Progression is slowed due to complexities/Impairments listed.   [] Progression has been slowed due to co-morbidities. [] Plan just implemented, too soon to assess goals progression <30days   [] Goals require adjustment due to lack of progress  [] Patient is not progressing as expected and requires additional follow up with physician  [] Other    Persisting Functional Limitations/Impairments:  []Sleeping []Sitting               [x]Standing [x]Transfers        [x]Walking [x]Kneeling               [x]Stairs [x]Squatting / bending   [x]ADLs [x]Reaching  []Lifting  []Housework  []Driving []Job related tasks  []Sports/Recreation []Other:        ASSESSMENT:  Pt without complaints during session. Added more reps. Plan to continue as above. Treatment/Activity Tolerance:  [] Patient able to complete tx  [] Patient limited by fatigue  [x] Patient limited by pain  [x] Patient limited by other medical complications  [] Other:     Prognosis: [x] Good [x] Fair  [] Poor    Patient Requires Follow-up: [x] Yes  [] No    Plan for next treatment session:  1 aquatic and 1 land session per week to work on left knee as well as exacerbation of right leg/low back deficits that will impact tolerance to left knee and strengthening. PLAN: See eval. PT 2x/week x 5 weeks to include both land and aquatic therapy   [x] Continue per plan of care [] Alter current plan (see comments)  [] Plan of care initiated [] Hold pending MD visit [] Discharge    Electronically signed by: Albert Rojas, PT , DPT    Note: If patient does not return for scheduled/ recommended follow up visits, this note will serve as a discharge from care along with most recent update on progress.

## 2022-03-09 ENCOUNTER — HOSPITAL ENCOUNTER (OUTPATIENT)
Dept: PHYSICAL THERAPY | Age: 72
Setting detail: THERAPIES SERIES
Discharge: HOME | End: 2022-03-09
Payer: MEDICARE

## 2022-03-09 PROCEDURE — 97110 THERAPEUTIC EXERCISES: CPT

## 2022-03-09 NOTE — FLOWSHEET NOTE
[facility] Physical Therapy  Phone: [phone]   Fax: [phone]      Physical Therapy Daily Treatment Note  Date:  3/9/2022    Patient Name:  Saul Cohen    :  1950  MRN: [id number]  Medical/Treatment Diagnosis Information:  · Diagnosis: Chronic pain of left knee M25.562, G89.29  · Treatment Diagnosis: Decreased standing/ambulation tolerance, difficulty with stairs, squatting, decreased functional activity tolerance  Insurance/Certification information:  PT Insurance Information: Medicare  Physician Information:  Referring Practitioner: Sheela Calvin  Plan of care signed (Y/N): []  Yes [x]  No     Date of Patient follow up with Physician:      Progress Report: []  Yes  [x]  No     Date Range for reporting period:   Beginnin/16   Ending:      Progress report due (10 Rx/or 30 days whichever is less): visit #  or      Recertification due (POC duration/ or 90 days whichever is less): visit #    Visit # Insurance Allowable Auth required? Date Range   6/10   [place] []  Yes  []  No            Latex Allergy:  [x]NO      []YES  Preferred Language for Healthcare:   [x]English       []other:    Functional Scale:        Date assessed:  LEFS: raw score = 33/80; dysfunction = %    LEFS: 23; 60-79%      22    Pain level:  5/10 left knee;      SUBJECTIVE:   Pt reports left knee is tight and stiff, slightly painful. Pt has not yet taken pain medications today. OBJECTIVE: 3/9: pt ambulates to NuStep without cane and slow gait speed.       RESTRICTIONS/PRECAUTIONS: SI injection on     Exercises/Interventions:     Therapeutic Exercises (94594) Resistance / level Sets/sec Reps Notes   Nustep   X 5 mins            IB, HR/TR  30\"  X 2   2 x 10     HS/HF Stretch       Prone Knee Extension  1/3\" H X 15 B : pt enjoys this, felt a stretch on LLE. 3/9 added hold   Step Ups Fwd 6\" 2 X 10 B : cont npv   Step Ups Lat 6\"  X 15 B : cont npv   LAQ  SLR Flex    Clamshells 2/24   Quad Set 2/24          T.G. Squats  Green band above knee  Ball Squeeze 1    1 X 15     X 15 3/9   Leg Press                     Therapeutic Activities (13121)       TrA: seated hip ADD ball squeeze w/ GH DB flex 2/24   Sit<>stands 2/24: from mat table, cues for upright posture once standing   Lateral Band Walk                     Neuromuscular Re-ed (28087)              3-way hip, band     Hip abd is part of HEP           Tandem balance Airex     SLS     Airex Hip Abduction                     Shuttle               Manual Intervention (08689)                                                   AquaticTherapy Dates of Service: 2/18, 2/21, 3/7  Aquatic Visits Exercises/Activities:   Transfers:  [x] Stairs   [] Ramp  [] Chair Lift   % Immersion:            Ambulation/ Warm up:   UE Exercises:       Forward  x2 Shoulder Shrugs      Lateral   x2 Shoulder Circles      Retro  X 2 Scapular Retraction      [place]    Push Downs      Heel/Toe Walking    Punching       Rowing       Elbow Flex/Ext       Shldr Flex/Ext       [employer] aBd/aDd    LE Exercises:  Shldr Horiz aBd/aDd    HR/TR x20 Shldr IR/ER    Marches x20 Arm Circles    Squats X20 PNF Diagonals    Hamstring Curls x20 Wall Push Ups    Hip Flexion (SLR) x20     Hip aBduction (SLR) x20     Hip Extension (SLR) x20      Hip aDduction (SLR)      Hip Circles x Functional:    Hip IR/Er  Step up forward x10 B   Hip Hikes x Step up lateral  x     Step down        Lunges Forward      Lunges Retro      Lunges Lateral     Balance:        SLS  2x15\"      Tandem Stance X 30\"      NBOS eyes open Seated:     NBOS eyes closed Ankle pumps     Hand to Opposite Knee x Ankle Circles     Fwd Step ups to SLS  Knee Flex/Ext x10   Lateral Step ups to SLS  Hip aBd/aDd    Stop/Go Gait   Bicycle       Ankle DF/PF      Ankle Inv/Ev    Stretching:       Gastroc/Soleus - wall      Hamstring - (seated) 2x30\" Deep Water:    Knee Flex Stretch  Jog    Piriformis  x Noodle Hang x   Hip Flexor x Traction at Wall    SKTC x     DKTC  x     ITB x Cool Down:    Quad  Fwd Walking x   Mid Back   Lat Walking x   UT  Retro Walking    Post Shoulder  Noodle float x   Ladder Pull      Pec Stretch            Core: Other:    TrA set x     Pelvic Tilts x     Multifidi Walk outs c paddle x     PNF Chop/Lifts                          Aquatic Abbreviation Key  B= Belt DB= Dumbells T= Theratube   H= Hydrotone N= Noodles W= Weights   P= Paddles S= Speedo equipment K= Kickboard              Modalities: 12/30, 3/9  CP to left knee x 10 mins    Pt. Education:  - Gave pt tour of pool, explained how to use lockers, what to wear, that it would be in group setting, and showed pt aquatic equipment.  -patient educated on diagnosis, prognosis and expectations for rehab  -all patient questions were answered    Home Exercise Program:      Access Code: [email or web address]  URL: [email or web address]  Date: 11/24/2021  Prepared by: Wandy Gupta     Exercises  Supine Quad Set - 2 x daily - 7 x weekly - 1-2 sets - 10 reps - 5 secs hold  Supine Active Straight Leg Raise - 2 x daily - 7 x weekly - 2-3 sets - 10 reps  Supine Heel Slide - 2 x daily - 7 x weekly - 2-3 sets - 10 reps  Clamshell with Resistance - 2 x daily - 7 x weekly - 2-3 sets - 10 reps  Side Stepping with Resistance at Ankles - 2 x daily - 7 x weekly - 2-3 sets - 10 reps  Standing Hip Abduction with Resistance at Ankles and Counter Support - 2 x daily - 7 x weekly - 2-3 sets - 10 reps    Therapeutic Exercise and NMR EXR  [x] (20986) Provided verbal/tactile cueing for activities related to strengthening, flexibility, endurance, ROM for improvements in  [x] LE / Lumbar: LE, proximal hip, and core control with self care, mobility, lifting, ambulation.   [] UE / Cervical: cervical, postural, scapular, scapulothoracic and UE control with self care, reaching, carrying, lifting, house/yardwork, driving, computer work.  [] (41785) Provided verbal/tactile cueing for activities related to improving balance, coordination, kinesthetic sense, posture, motor skill, proprioception to assist with   [] LE / lumbar: LE, proximal hip, and core control in self care, mobility, lifting, ambulation and eccentric single leg control. [] UE / cervical: cervical, scapular, scapulothoracic and UE control with self care, reaching, carrying, lifting, house/yardwork, driving, computer work.        [] (27807) Aquatic therapy with therapeutic exercise. Provided verbal and tactile cueing for activities related to strengthening, flexibility, endurance, ROM for improvements in  [] LE / lumbar: LE, proximal hip, and core control in self care, mobility, lifting, ambulation and eccentric single leg control. [] UE / cervical: cervical, scapular, scapulothoracic and UE control with self care, reaching, carrying, lifting, house/yardwork, driving, computer work.   [] (16082) Therapist is in constant attendance of 2 or more patients providing skilled therapy interventions, but not providing any significant amount of measurable one-on-one time to either patient, for improvements in  [] LE / lumbar: LE, proximal hip, and core control in self care, mobility, lifting, ambulation and eccentric single leg control. [] UE / cervical: cervical, scapular, scapulothoracic and UE control with self care, reaching, carrying, lifting, house/yardwork, driving, computer work.        NMR and Therapeutic Activities:    [] (88207 or 92996) Provided verbal/tactile cueing for activities related to improving balance, coordination, kinesthetic sense, posture, motor skill, proprioception and motor activation to allow for proper function of   [] LE: / Lumbar core, proximal hip and LE with self care and ADLs  [] UE / Cervical: cervical, postural, scapular, scapulothoracic and UE control with self care, carrying, lifting, driving, computer work.   [] (47017) Gait Re-education- Provided training and instruction to the patient for proper LE, core and proximal hip recruitment and positioning and eccentric body weight control with ambulation re-education including up and down stairs     Home Management Training / Self Care:  [] (19775) Provided self-care/home management training related to activities of daily living and compensatory training, and/or use of adaptive equipment for improvement with: ADLs and compensatory training, meal preparation, safety procedures and instruction in use of adaptive equipment, including bathing, grooming, dressing, personal hygiene, basic household cleaning and chores. Home Exercise Program:    [] (90905) Reviewed/Progressed HEP activities related to strengthening, flexibility, endurance, ROM of   [] LE / Lumbar: core, proximal hip and LE for functional self-care, mobility, lifting and ambulation/stair navigation   [] UE / Cervical: cervical, postural, scapular, scapulothoracic and UE control with self care, reaching, carrying, lifting, house/yardwork, driving, computer work  [] (69340)Reviewed/Progressed HEP activities related to improving balance, coordination, kinesthetic sense, posture, motor skill, proprioception of   [] LE: core, proximal hip and LE for self care, mobility, lifting, and ambulation/stair navigation    [] UE / Cervical: cervical, postural,  scapular, scapulothoracic and UE control with self care, reaching, carrying, lifting, house/yardwork, driving, computer work    Manual Treatments:  PROM / STM / Oscillations-Mobs:  G-I, II, III, IV (PA's, Inf., Post.)  [] (21534) Provided manual therapy to mobilize LE, proximal hip and/or LS spine soft tissue/joints for the purpose of modulating pain, promoting relaxation,  increasing ROM, reducing/eliminating soft tissue swelling/inflammation/restriction, improving soft tissue extensibility and allowing for proper ROM for normal function with   [] LE / lumbar: self care, mobility, lifting and ambulation.     [] UE / Cervical: self care, reaching, carrying, lifting, house/yardwork, driving, computer work. Modalities:  [] (40730) Vasopneumatic compression: Utilized vasopneumatic compression to decrease edema / swelling for the purpose of improving mobility and quad tone / recruitment which will allow for increased overall function including but not limited to self-care, transfers, ambulation, and ascending / descending stairs.     [] (99535) Aquatic therapy with therapeutic exercise. Provided verbal and tactile cueing for activities related to strengthening, flexibility, endurance, ROM for improvements in  [] LE / lumbar: LE, proximal hip, and core control in self care, mobility, lifting, ambulation and eccentric single leg control. [] UE / cervical: cervical, scapular, scapulothoracic and UE control with self care, reaching, carrying, lifting, house/yardwork, driving, computer work.   [] (24580) Therapist is in constant attendance of 2 or more patients providing skilled therapy interventions, but not providing any significant amount of measurable one-on-one time to either patient, for improvements in  [] LE / lumbar: LE, proximal hip, and core control in self care, mobility, lifting, ambulation and eccentric single leg control. [] UE / cervical: cervical, scapular, scapulothoracic and UE control with self care, reaching, carrying, lifting, house/yardwork, driving, computer work.      Charges:  Timed Code Treatment Minutes: 40   Total Treatment Minutes: 50     [] EVAL - LOW (75385)   [] EVAL - MOD (11554)  [] EVAL - HIGH (91937)  [] RE-EVAL (84620)  [x] SI(01074) x 3       [] Ionto  [] NMR (64099) x       [] Vaso  [] Manual (19627) x       [] Ultrasound  [] TA x        [] Mech Traction (27834)  [] Aquatic Therapy x 1    [] ES (un) (05283):   [] Home Management Training x  [] ES(attended) (72045)   [] Dry Needling 1-2 muscles (96662):  [] Dry Needling 3+ muscles (048763)  [] Group: 1     [] Other:     GOALS:     Patient stated goal: learn new exercises to maintain, help with pain relief, go up/down steps better, walk without feeling of instability and not use a cane at all.   []? Progressing: []? Met: []? Not Met: []? Adjusted     Therapist goals for Patient:   Short Term Goals: To be achieved in: 2 weeks  1. Independent in HEP and progression per patient tolerance, in order to prevent re-injury. []? Progressing: []? Met: []? Not Met: []? Adjusted  2. Patient will have a decrease in pain to <= 3/10 in left knee and right leg to  facilitate improvement in movement, function, and ADLs as indicated by Functional Deficits. []? Progressing: []? Met: []? Not Met: []? Adjusted     Long Term Goals: To be achieved in:  5 weeks or discharge   1. Disability index score of 20% or less for the LEFS to assist with reaching prior level of function. []? Progressing: []? Met: []? Not Met: []? Adjusted  2. Patient will demonstrate increased AROM to 120* left knee flexion to allow for proper joint functioning as indicated by patients Functional Deficits. []? Progressing: []? Met: []? Not Met: []? Adjusted  3. Patient will demonstrate an increase in Strength to at least  4+/5 in bilateral LE as well as good proximal hip strength and control to allow for proper functional mobility as indicated by patients Functional Deficits. []? Progressing: []? Met: []? Not Met: []? Adjusted  4. Patient will return to functional activities including  Ascending/descending steps in a step-to pattern without increased symptoms or restriction. []? Progressing: []? Met: []? Not Met: []? Adjusted  5. Patient will demonstrate the ability to ambulate without evidence of antalgia and normal step/stride length without A.D. in order to resume her prior functional activities without increased symptoms or restriction  []? Progressing: []? Met: []? Not Met: []?  Adjusted      Overall Progression Towards Functional goals/ Treatment Progress Update:  [x] Patient is progressing as expected towards functional goals listed. [] Progression is slowed due to complexities/Impairments listed. [] Progression has been slowed due to co-morbidities. [] Plan just implemented, too soon to assess goals progression <30days   [] Goals require adjustment due to lack of progress  [] Patient is not progressing as expected and requires additional follow up with physician  [] Other    Persisting Functional Limitations/Impairments:  []Sleeping []Sitting               [x]Standing [x]Transfers        [x]Walking [x]Kneeling               [x]Stairs [x]Squatting / bending   [x]ADLs [x]Reaching  []Lifting  []Housework  []Driving []Job related tasks  []Sports/Recreation []Other:        ASSESSMENT:  Pt with better tolerance to increased reps and strengthening this date. Pt able to demo good technique with exercises w/ minimal cuing today. Pt requesting CP on L knee to improve pain. Continue to progress knee strengthening and ROM per pt tolerance. Treatment/Activity Tolerance:  [x] Patient able to complete tx  [] Patient limited by fatigue  [x] Patient limited by pain  [] Patient limited by other medical complications  [] Other:     Prognosis: [x] Good [x] Fair  [] Poor    Patient Requires Follow-up: [x] Yes  [] No    Plan for next treatment session:  1 aquatic and 1 land session per week to work on left knee as well as exacerbation of right leg/low back deficits that will impact tolerance to left knee and strengthening. PLAN: See eval. PT 2x/week x 5 weeks to include both land and aquatic therapy    [x] Continue per plan of care [] Alter current plan (see comments)  [] Plan of care initiated [] Hold pending MD visit [] Discharge    Electronically signed by: Norma Willett, PT , PT  Therapist was present, directed the patient's care, made skilled judgement, and was responsible for assessment and treatment of the patient.       Lisbeth Smiley, SPT    Note: If patient does not return for scheduled/ recommended follow up visits, this note will serve as a discharge from care along with most recent update on progress.

## 2022-03-14 ENCOUNTER — HOSPITAL ENCOUNTER (OUTPATIENT)
Dept: PHYSICAL THERAPY | Age: 72
Setting detail: THERAPIES SERIES
Discharge: HOME | End: 2022-03-14
Payer: MEDICARE

## 2022-03-14 NOTE — FLOWSHEET NOTE
[address] [facility]    Physical Therapy  Cancellation/No-show Note  Patient Name:  Carmelo Goddard  :  1950   Date:  3/14/2022    Cancelled visits to date: 3  No-shows to date: 2    For today's appointment patient:  [x]  Cancelled , , 3/14  []  Rescheduled appointment  []  No-show , 3/2    Reason given by patient:  []  Patient ill  []  Conflicting appointment  []  No transportation    []  Conflict with work  []  No reason given  [x]  Other:     Comments: sister is having surgery         Phone call information:   []  Phone call made today to patient at 11:16 am at number provided: Mobile phone    []  Patient answered, conversation as follows: Said she contacted our office yesterday morning to cancel today's appt, said that Dr. Rozina Boateng told her to take a week off from PT after the steroid injection and that she would start back up next week as long as she's feeling better. []  Patient did not answer, message left as follows:  []  Phone call not made today  [x]  Phone call not needed - pt contacted us to cancel and provided reason for cancellation.      Electronically signed by:  Diego Jacobson, PT, DPT

## 2022-03-16 ENCOUNTER — HOSPITAL ENCOUNTER (OUTPATIENT)
Dept: PHYSICAL THERAPY | Age: 72
Setting detail: THERAPIES SERIES
Discharge: HOME | End: 2022-03-16
Payer: MEDICARE

## 2022-03-16 PROCEDURE — 97110 THERAPEUTIC EXERCISES: CPT

## 2022-03-16 NOTE — PLAN OF CARE
[address] [facility]  Phone: [phone]   Fax: [phone]    Physical Therapy Re-Certification Plan of Care    Dear   Alex Joshua,     We had the pleasure of treating the following patient for physical therapy services at [facility]. A summary of our findings can be found in the updated assessment below. This includes our plan of care. If you have any questions or concerns regarding these findings, please do not hesitate to contact me at the office phone number checked above. Thank you for the referral.     Physician Signature:________________________________Date:__________________  By signing above (or electronic signature), therapist's plan is approved by physician      Functional Outcome:               LEFS Total Score: 36  LEFS Disability Index: 40-59%                                  AROM     L R   Knee Flexion 127 132   Knee Extension 0 0     Strength (0-5) / Myotomes Left Right   Hip Flexion - seated (L1-2) 4+ 5   Hip Abduction 4- 3   Hip Extension  3 3   Hip ER 5 5   Hip IR 5 4+   Quads (L2-4) 5 5   Hamstrings 5 5   Ankle Dorsiflexion (L4-5) 5 5     Hamstring flexibility (90/90 position): R lacking 7 deg; L lacking 5 deg     Overall Response to Treatment:   [x]Patient is responding well to treatment and improvement is noted with regards  to goals   []Patient should continue to improve in reasonable time if they continue HEP   []Patient has plateaued and is no longer responding to skilled PT intervention    []Patient is getting worse and would benefit from return to referring MD   []Patient unable to adhere to initial POC   [x]Other: Patient seen for a total of 7 visits post re-eval for treatment of her L knee pain. During these visits, aquatic therapy has been implemented into the POC and pt has responded favorably to this.  Pt to benefit from continued therapy to address remaining strength deficits, primarily in her glutes, to improve functional activity tolerance including stair negotiation and prolonged walking. Date range of Visits: 22 - 3/16/22  Total Visits: 7 (since re-eval)    Recommendation:    [x]Continue PT 2x / wk for 3 weeks following initial POC              []Hold PT, pending MD visit        Physical Therapy Daily Treatment Note  Date:  3/16/2022    Patient Name:  Carmelo Goddard    :  1950  MRN: [id number]  Medical/Treatment Diagnosis Information:  · Diagnosis: Chronic pain of left knee M25.562, G89.29  · Treatment Diagnosis: Decreased standing/ambulation tolerance, difficulty with stairs, squatting, decreased functional activity tolerance  Insurance/Certification information:  PT Insurance Information: Medicare  Physician Information:  Referring Practitioner: Justin Diane  Plan of care signed (Y/N): []  Yes [x]  No     Date of Patient follow up with Physician:      Progress Report: [x]  Yes  []  No     Date Range for reporting period:   Beginnin/16   POC: 3/16/22  Ending:      Progress report due (10 Rx/or 30 days whichever is less): visit #  or 3/74     Recertification due (POC duration/ or 90 days whichever is less): visit #    Visit # Insurance Allowable Auth required? Date Range   7/10   [place] []  Yes  []  No            Latex Allergy:  [x]NO      []YES  Preferred Language for Healthcare:   [x]English       []other:    Functional Scale:        Date assessed:  LEFS: raw score = 33/80; dysfunction = %    LEFS: 23; 60-79%      22  LEFS: raw score = 36; dysfunction 55%   3/16/22    Pain level:  3/10 left knee;      SUBJECTIVE: She did have bilateral MISAEL in her lumbar spine and finally feels like it is helping (took about 3 weeks). Pt reports her L knee continues to feel tight and the pain is always on the anterior/medial side. She has been conscious of heel/toe walking. OBJECTIVE: 3/9: pt ambulates to NuStep without cane and slow gait speed.   3/16: See POC for       RESTRICTIONS/PRECAUTIONS: SI injection on 2/23    Exercises/Interventions:     Therapeutic Exercises (28732) Resistance / level Sets/sec Reps Notes   Nustep   X 7 mins            IB, HR/TR  30\"  X 2   2 x 10     HS/HF Stretch       Prone Knee Extension  2/24: pt enjoys this, felt a stretch on LLE. 3/9 added hold   Step Ups Fwd 6\" 2/24: cont npv   Step Ups Lat 6\" 2/24: cont npv   LAQ 2/24   SLR Flex    Clamshells 2/24   Quad Set 2/24          T.G. Squats  Green band above knee  Ball Squeeze 3/9   Leg Press       PN completed - see above for ROM, MMT, LEFS scores 3/16             Therapeutic Activities (16201)       TrA: seated hip ADD ball squeeze w/ GH DB flex 2/24   Sit<>stands 2/24: from mat table, cues for upright posture once standing   Lateral Band Walk                     Neuromuscular Re-ed (23399)              3-way hip, band     Hip abd is part of HEP           Tandem balance Airex     SLS     Airex Hip Abduction                     Shuttle               Manual Intervention (06475)                                                   AquaticTherapy Dates of Service: 2/18, 2/21, 3/7  Aquatic Visits Exercises/Activities:   Transfers:  [x] Stairs   [] Ramp  [] Chair Lift   % Immersion:            Ambulation/ Warm up:   UE Exercises:       Forward  x2 Shoulder Shrugs      Lateral   x2 Shoulder Circles      Retro  X 2 Scapular Retraction      Cariocas    Push Downs      Heel/Toe Walking    Punching       Rowing       Elbow Flex/Ext       Shldr Flex/Ext       [employer] aBd/aDd    LE Exercises:  Shldr Horiz aBd/aDd    HR/TR x20 Shldr IR/ER    Marches x20 Arm Circles    Squats X20 PNF Diagonals    Hamstring Curls x20 Wall Push Ups    Hip Flexion (SLR) x20     Hip aBduction (SLR) x20     Hip Extension (SLR) x20      Hip aDduction (SLR)      Hip Circles x Functional:    Hip IR/Er  Step up forward x10 B   Hip Hikes x Step up lateral  x     Step down        Lunges Forward      Lunges Retro      Lunges Lateral Balance:        SLS  2x15\"      Tandem Stance X 30\"      NBOS eyes open Seated:     NBOS eyes closed Ankle pumps     Hand to Opposite Knee x Ankle Circles     Fwd Step ups to SLS  Knee Flex/Ext x10   Lateral Step ups to SLS  Hip aBd/aDd    Stop/Go Gait   Bicycle       Ankle DF/PF      Ankle Inv/Ev    Stretching:       Gastroc/Soleus - wall      Hamstring - (seated) 2x30\" Deep Water:    Knee Flex Stretch  Jog    Piriformis  x Noodle Hang x   Hip Flexor x Traction at Wall    SKTC x     DKTC  x     ITB x Cool Down:    Quad  Fwd Walking x   Mid Back   Lat Walking x   UT  Retro Walking    Post Shoulder  Noodle float x   Ladder Pull      Pec Stretch            Core: Other:    TrA set x     Pelvic Tilts x     Multifidi Walk outs c paddle x     PNF Chop/Lifts                          Aquatic Abbreviation Key  B= Belt DB= Dumbells T= Theratube   H= Hydrotone N= Noodles W= Weights   P= Paddles S= Speedo equipment K= Kickboard              Modalities: 12/30, 3/9  CP to left knee x 10 mins    Pt. Education:  - Gave pt tour of pool, explained how to use lockers, what to wear, that it would be in group setting, and showed pt aquatic equipment.  -patient educated on diagnosis, prognosis and expectations for rehab  -all patient questions were answered    Home Exercise Program:      Access Code: [email or web address]  URL: [email or web address]  Date: 11/24/2021  Prepared by: Sky Dick     Exercises  Supine Quad Set - 2 x daily - 7 x weekly - 1-2 sets - 10 reps - 5 secs hold  Supine Active Straight Leg Raise - 2 x daily - 7 x weekly - 2-3 sets - 10 reps  Supine Heel Slide - 2 x daily - 7 x weekly - 2-3 sets - 10 reps  Clamshell with Resistance - 2 x daily - 7 x weekly - 2-3 sets - 10 reps  Side Stepping with Resistance at Ankles - 2 x daily - 7 x weekly - 2-3 sets - 10 reps  Standing Hip Abduction with Resistance at Ankles and Counter Support - 2 x daily - 7 x weekly - 2-3 sets - 10 reps    Therapeutic Exercise and NMR EXR  [x] improving balance, coordination, kinesthetic sense, posture, motor skill, proprioception and motor activation to allow for proper function of   [] LE: / Lumbar core, proximal hip and LE with self care and ADLs  [] UE / Cervical: cervical, postural, scapular, scapulothoracic and UE control with self care, carrying, lifting, driving, computer work.   [] (11009) Gait Re-education- Provided training and instruction to the patient for proper LE, core and proximal hip recruitment and positioning and eccentric body weight control with ambulation re-education including up and down stairs     Home Management Training / Self Care:  [] (20846) Provided self-care/home management training related to activities of daily living and compensatory training, and/or use of adaptive equipment for improvement with: ADLs and compensatory training, meal preparation, safety procedures and instruction in use of adaptive equipment, including bathing, grooming, dressing, personal hygiene, basic household cleaning and chores.      Home Exercise Program:    [] (57194) Reviewed/Progressed HEP activities related to strengthening, flexibility, endurance, ROM of   [] LE / Lumbar: core, proximal hip and LE for functional self-care, mobility, lifting and ambulation/stair navigation   [] UE / Cervical: cervical, postural, scapular, scapulothoracic and UE control with self care, reaching, carrying, lifting, house/yardwork, driving, computer work  [] (14526)Reviewed/Progressed HEP activities related to improving balance, coordination, kinesthetic sense, posture, motor skill, proprioception of   [] LE: core, proximal hip and LE for self care, mobility, lifting, and ambulation/stair navigation    [] UE / Cervical: cervical, postural,  scapular, scapulothoracic and UE control with self care, reaching, carrying, lifting, house/yardwork, driving, computer work    Manual Treatments:  PROM / STM / Oscillations-Mobs:  G-I, II, III, IV (PA's, Inf., Post.)  [] (11928) Provided manual therapy to mobilize LE, proximal hip and/or LS spine soft tissue/joints for the purpose of modulating pain, promoting relaxation,  increasing ROM, reducing/eliminating soft tissue swelling/inflammation/restriction, improving soft tissue extensibility and allowing for proper ROM for normal function with   [] LE / lumbar: self care, mobility, lifting and ambulation. [] UE / Cervical: self care, reaching, carrying, lifting, house/yardwork, driving, computer work. Modalities:  [] (60658) Vasopneumatic compression: Utilized vasopneumatic compression to decrease edema / swelling for the purpose of improving mobility and quad tone / recruitment which will allow for increased overall function including but not limited to self-care, transfers, ambulation, and ascending / descending stairs.     [] (71221) Aquatic therapy with therapeutic exercise. Provided verbal and tactile cueing for activities related to strengthening, flexibility, endurance, ROM for improvements in  [] LE / lumbar: LE, proximal hip, and core control in self care, mobility, lifting, ambulation and eccentric single leg control. [] UE / cervical: cervical, scapular, scapulothoracic and UE control with self care, reaching, carrying, lifting, house/yardwork, driving, computer work.   [] (14536) Therapist is in constant attendance of 2 or more patients providing skilled therapy interventions, but not providing any significant amount of measurable one-on-one time to either patient, for improvements in  [] LE / lumbar: LE, proximal hip, and core control in self care, mobility, lifting, ambulation and eccentric single leg control. [] UE / cervical: cervical, scapular, scapulothoracic and UE control with self care, reaching, carrying, lifting, house/yardwork, driving, computer work.      Charges:  Timed Code Treatment Minutes: 42   Total Treatment Minutes: 42     [] EVAL - LOW (42545)   [] EVAL - MOD (25447)  [] EVAL - HIGH (19938)  [] RE-EVAL (00554)  [x] BK(06633) x 3       [] Ionto  [] NMR (67622) x       [] Vaso  [] Manual (29908) x       [] Ultrasound  [] TA x        [] Mech Traction (66610)  [] Aquatic Therapy x 1    [] ES (un) (69566):   [] Home Management Training x  [] ES(attended) (87893)   [] Dry Needling 1-2 muscles (58369):  [] Dry Needling 3+ muscles (515756)  [] Group: 1     [] Other:     GOALS:     Patient stated goal: learn new exercises to maintain, help with pain relief, go up/down steps better, walk without feeling of instability and not use a cane at all.   []? Progressing: []? Met: []? Not Met: []? Adjusted     Therapist goals for Patient:   Short Term Goals: To be achieved in: 2 weeks  1. Independent in HEP and progression per patient tolerance, in order to prevent re-injury. []? Progressing: [x]? Met: []? Not Met: []? Adjusted  2. Patient will have a decrease in pain to <= 3/10 in left knee and right leg to  facilitate improvement in movement, function, and ADLs as indicated by Functional Deficits. [x]? Progressing: []? Met: []? Not Met: []? Adjusted     Long Term Goals: To be achieved in:  5 weeks or discharge   1. Disability index score of 20% or less for the LEFS to assist with reaching prior level of function. [x]? Progressing: []? Met: []? Not Met: []? Adjusted  2. Patient will demonstrate increased AROM to 120* left knee flexion to allow for proper joint functioning as indicated by patients Functional Deficits. [x]? Progressing: []? Met: []? Not Met: []? Adjusted  3. Patient will demonstrate an increase in Strength to at least  4+/5 in bilateral LE as well as good proximal hip strength and control to allow for proper functional mobility as indicated by patients Functional Deficits. [x]? Progressing: []? Met: []? Not Met: []? Adjusted  4. Patient will return to functional activities including ascending/descending steps in a step-to pattern without increased symptoms or restriction. [x]? Progressing: []? Met: []? Not Met: []? Adjusted  5. Patient will demonstrate the ability to ambulate without evidence of antalgia and normal step/stride length without A.D. in order to resume her prior functional activities without increased symptoms or restriction  [x]? Progressing: []? Met: []? Not Met: []? Adjusted      Overall Progression Towards Functional goals/ Treatment Progress Update:  [x] Patient is progressing as expected towards functional goals listed. [] Progression is slowed due to complexities/Impairments listed. [] Progression has been slowed due to co-morbidities. [] Plan just implemented, too soon to assess goals progression <30days   [] Goals require adjustment due to lack of progress  [] Patient is not progressing as expected and requires additional follow up with physician  [] Other    Persisting Functional Limitations/Impairments:  []Sleeping []Sitting               [x]Standing [x]Transfers        [x]Walking [x]Kneeling               [x]Stairs [x]Squatting / bending   [x]ADLs [x]Reaching  []Lifting  []Housework  []Driving []Job related tasks  []Sports/Recreation []Other:        ASSESSMENT: See above      Treatment/Activity Tolerance:  [x] Patient able to complete tx  [] Patient limited by fatigue  [x] Patient limited by pain  [] Patient limited by other medical complications  [] Other:     Prognosis: [x] Good [x] Fair  [] Poor    Patient Requires Follow-up: [x] Yes  [] No    Plan for next treatment session:  1 aquatic and 1 land session per week to work on left knee as well as exacerbation of right leg/low back deficits that will impact tolerance to left knee and strengthening.      PLAN: See eval. PT 2x/week x 5 weeks to include both land and aquatic therapy    [x] Continue per plan of care [] Alter current plan (see comments)  [] Plan of care initiated [] Hold pending MD visit [] Discharge    Electronically signed by: Mo Gomez, PT , DPT      Note: If patient does not return for

## 2022-03-18 ENCOUNTER — CLINICAL DOCUMENTATION (OUTPATIENT)
Dept: SPIRITUAL SERVICES | Age: 72
End: 2022-03-18

## 2022-03-18 NOTE — ACP (ADVANCE CARE PLANNING)
Advance Care Planning   Ambulatory ACP Specialist Patient Outreach    Date:  3/18/2022  ACP Specialist:  Maneulito Favorite    Outreach call to patient in follow-up to ACP Specialist referral from: Jane Hilario MD    [x] PCP  [] Provider   [] Ambulatory Care Management [] Other for Reason:    [x] Advance Directive Assistance  [] Code Status Discussion  [] Complete Portable DNR Order  [] Discuss Goals of Care  [] Complete POST/MOST  [] Early ACP Decision-Making  [] Other    Date Referral Received:2/22/22    Today's Outreach:  [] First   [x] Second  [] Third                               Third outreach made by []  phone  [] email []   [facility]     Intervention:  [x] Spoke with Patient  [] Left VM requesting return call      Outcome:Spoke with Pt. Pt stated that it would be easiest for her to meet with a  at [facility] to go over Advance Directive forms. Per Pt's request, passed on Pt's information to  at [place], who will give Pt a call to set up appointment with her. Next Step:   [x] ACP scheduled conversation  [] Outreach again in one week               [] Email / Mail ACP Info Sheets  [] Email / Mail Advance Directive            [] Close Referral. Routing closure to referring provider/staff and to ACP Specialist .      Thank you for this referral.

## 2022-03-21 ENCOUNTER — HOSPITAL ENCOUNTER (OUTPATIENT)
Dept: PHYSICAL THERAPY | Age: 72
Setting detail: THERAPIES SERIES
Discharge: HOME | End: 2022-03-21
Payer: MEDICARE

## 2022-03-21 PROCEDURE — 97150 GROUP THERAPEUTIC PROCEDURES: CPT

## 2022-03-21 PROCEDURE — 97113 AQUATIC THERAPY/EXERCISES: CPT

## 2022-03-21 NOTE — FLOWSHEET NOTE
[facility]  Phone: [phone]   Fax: [phone]          Physical Therapy Daily Treatment Note  Date:  3/21/2022    Patient Name:  China Mcduffie    :  1950  MRN: [id number]  Medical/Treatment Diagnosis Information:  · Diagnosis: Chronic pain of left knee M25.562, G89.29  · Treatment Diagnosis: Decreased standing/ambulation tolerance, difficulty with stairs, squatting, decreased functional activity tolerance  Insurance/Certification information:  PT Insurance Information: Medicare  Physician Information:  Referring Practitioner: Ute Mercado  Plan of care signed (Y/N): []  Yes [x]  No     Date of Patient follow up with Physician:      Progress Report: [x]  Yes  []  No     Date Range for reporting period:   Beginnin/16   POC: 3/16/22  Ending:      Progress report due (10 Rx/or 30 days whichever is less): visit #  or 3/19     Recertification due (POC duration/ or 90 days whichever is less): visit #    Visit # Insurance Allowable Auth required? Date Range      [place] []  Yes  []  No            Latex Allergy:  [x]NO      []YES  Preferred Language for Healthcare:   [x]English       []other:    Functional Scale:        Date assessed:  LEFS: raw score = 33/80; dysfunction = %    LEFS: 23; 60-79%      22  LEFS: raw score = 36; dysfunction 55%   3/16/22    Pain level:  4/10 left knee;      SUBJECTIVE: . Pt reports her L knee feels a little better but  pain is always on the anterior/medial side with stiffness. She has been conscious of heel/toe walking. OBJECTIVE: 3/9: pt ambulates to NuStep without cane and slow gait speed.   3/16: See POC for       RESTRICTIONS/PRECAUTIONS: SI injection on     Exercises/Interventions:     Therapeutic Exercises (51269) Resistance / level Sets/sec Reps Notes   Nustep   X 7 mins            IB, HR/TR  30\"  X 2   2 x 10     HS/HF Stretch       Prone Knee Extension  : pt enjoys this, felt a Ankle Inv/Ev    Stretching:       Gastroc/Soleus - wall 3x30\"     Hamstring - (seated) 2x30\" Deep Water:    Knee Flex Stretch  Jog    Piriformis  x Noodle Hang x   Hip Flexor X20\" X3 L/R standing on step Traction at Wall    SKTC x     DKTC  x     ITB x Cool Down:    Quad  Fwd Walking x   Mid Back   Lat Walking x   UT  Retro Walking    Post Shoulder  Noodle float x   Ladder Pull      Pec Stretch            Core: Other:    TrA set x     Pelvic Tilts x     Multifidi Walk outs c paddle x     PNF Chop/Lifts                          Aquatic Abbreviation Key  B= Belt DB= Dumbells T= Theratube   H= Hydrotone N= Noodles W= Weights   P= Paddles S= Speedo equipment K= Kickboard              Modalities: 12/30, 3/9  CP to left knee x 10 mins    Pt. Education:  - Gave pt tour of pool, explained how to use lockers, what to wear, that it would be in group setting, and showed pt aquatic equipment.  -patient educated on diagnosis, prognosis and expectations for rehab  -all patient questions were answered    Home Exercise Program:      Access Code: [email or web address]  URL: [employer]/  Date: 11/24/2021  Prepared by: Rebecca Erp     Exercises  Supine Quad Set - 2 x daily - 7 x weekly - 1-2 sets - 10 reps - 5 secs hold  Supine Active Straight Leg Raise - 2 x daily - 7 x weekly - 2-3 sets - 10 reps  Supine Heel Slide - 2 x daily - 7 x weekly - 2-3 sets - 10 reps  Clamshell with Resistance - 2 x daily - 7 x weekly - 2-3 sets - 10 reps  Side Stepping with Resistance at Ankles - 2 x daily - 7 x weekly - 2-3 sets - 10 reps  Standing Hip Abduction with Resistance at Ankles and Counter Support - 2 x daily - 7 x weekly - 2-3 sets - 10 reps    Therapeutic Exercise and NMR EXR  [x] (30769) Provided verbal/tactile cueing for activities related to strengthening, flexibility, endurance, ROM for improvements in  [x] LE / Lumbar: LE, proximal hip, and core control with self care, mobility, lifting, ambulation.   [] UE / Cervical: cervical, postural, scapular, scapulothoracic and UE control with self care, reaching, carrying, lifting, house/yardwork, driving, computer work.  [] (06987) Provided verbal/tactile cueing for activities related to improving balance, coordination, kinesthetic sense, posture, motor skill, proprioception to assist with   [] LE / lumbar: LE, proximal hip, and core control in self care, mobility, lifting, ambulation and eccentric single leg control. [] UE / cervical: cervical, scapular, scapulothoracic and UE control with self care, reaching, carrying, lifting, house/yardwork, driving, computer work.        [] (15383) Aquatic therapy with therapeutic exercise. Provided verbal and tactile cueing for activities related to strengthening, flexibility, endurance, ROM for improvements in  [] LE / lumbar: LE, proximal hip, and core control in self care, mobility, lifting, ambulation and eccentric single leg control. [] UE / cervical: cervical, scapular, scapulothoracic and UE control with self care, reaching, carrying, lifting, house/yardwork, driving, computer work.   [] (51715) Therapist is in constant attendance of 2 or more patients providing skilled therapy interventions, but not providing any significant amount of measurable one-on-one time to either patient, for improvements in  [] LE / lumbar: LE, proximal hip, and core control in self care, mobility, lifting, ambulation and eccentric single leg control. [] UE / cervical: cervical, scapular, scapulothoracic and UE control with self care, reaching, carrying, lifting, house/yardwork, driving, computer work.        NMR and Therapeutic Activities:    [] (60434 or 62319) Provided verbal/tactile cueing for activities related to improving balance, coordination, kinesthetic sense, posture, motor skill, proprioception and motor activation to allow for proper function of   [] LE: / Lumbar core, proximal hip and LE with self care and ADLs  [] UE / Cervical: cervical, postural, scapular, scapulothoracic and UE control with self care, carrying, lifting, driving, computer work.   [] (33941) Gait Re-education- Provided training and instruction to the patient for proper LE, core and proximal hip recruitment and positioning and eccentric body weight control with ambulation re-education including up and down stairs     Home Management Training / Self Care:  [] (90862) Provided self-care/home management training related to activities of daily living and compensatory training, and/or use of adaptive equipment for improvement with: ADLs and compensatory training, meal preparation, safety procedures and instruction in use of adaptive equipment, including bathing, grooming, dressing, personal hygiene, basic household cleaning and chores.      Home Exercise Program:    [] (71527) Reviewed/Progressed HEP activities related to strengthening, flexibility, endurance, ROM of   [] LE / Lumbar: core, proximal hip and LE for functional self-care, mobility, lifting and ambulation/stair navigation   [] UE / Cervical: cervical, postural, scapular, scapulothoracic and UE control with self care, reaching, carrying, lifting, house/yardwork, driving, computer work  [] (35527)Reviewed/Progressed HEP activities related to improving balance, coordination, kinesthetic sense, posture, motor skill, proprioception of   [] LE: core, proximal hip and LE for self care, mobility, lifting, and ambulation/stair navigation    [] UE / Cervical: cervical, postural,  scapular, scapulothoracic and UE control with self care, reaching, carrying, lifting, house/yardwork, driving, computer work    Manual Treatments:  PROM / STM / Oscillations-Mobs:  G-I, II, III, IV (PA's, Inf., Post.)  [] (17595) Provided manual therapy to mobilize LE, proximal hip and/or LS spine soft tissue/joints for the purpose of modulating pain, promoting relaxation,  increasing ROM, reducing/eliminating soft tissue swelling/inflammation/restriction, improving soft tissue extensibility and allowing for proper ROM for normal function with   [] LE / lumbar: self care, mobility, lifting and ambulation. [] UE / Cervical: self care, reaching, carrying, lifting, house/yardwork, driving, computer work. Modalities:  [] (13829) Vasopneumatic compression: Utilized vasopneumatic compression to decrease edema / swelling for the purpose of improving mobility and quad tone / recruitment which will allow for increased overall function including but not limited to self-care, transfers, ambulation, and ascending / descending stairs.     [] (14562) Aquatic therapy with therapeutic exercise. Provided verbal and tactile cueing for activities related to strengthening, flexibility, endurance, ROM for improvements in  [] LE / lumbar: LE, proximal hip, and core control in self care, mobility, lifting, ambulation and eccentric single leg control. [] UE / cervical: cervical, scapular, scapulothoracic and UE control with self care, reaching, carrying, lifting, house/yardwork, driving, computer work.   [] (51293) Therapist is in constant attendance of 2 or more patients providing skilled therapy interventions, but not providing any significant amount of measurable one-on-one time to either patient, for improvements in  [] LE / lumbar: LE, proximal hip, and core control in self care, mobility, lifting, ambulation and eccentric single leg control. [] UE / cervical: cervical, scapular, scapulothoracic and UE control with self care, reaching, carrying, lifting, house/yardwork, driving, computer work.      Charges:  Timed Code Treatment Minutes: 15   Total Treatment Minutes: 42     [] EVAL - LOW (11865)   [] EVAL - MOD (84748)  [] EVAL - HIGH (60181)  [] RE-EVAL (76822)  [] PK(95390) x 3       [] Ionto  [] NMR (71825) x       [] Vaso  [] Manual (73739) x       [] Ultrasound  [] TA x        [] Mech Traction (28610)  [x] Aquatic Therapy x 1    [] ES (un) (37320):   [] Home Management Training x  [] ES(attended) (30625)   [] Dry Needling 1-2 muscles (90238):  [] Dry Needling 3+ muscles (806186)  [x] Group: 1     [] Other:     GOALS:     Patient stated goal: learn new exercises to maintain, help with pain relief, go up/down steps better, walk without feeling of instability and not use a cane at all.   []? Progressing: []? Met: []? Not Met: []? Adjusted     Therapist goals for Patient:   Short Term Goals: To be achieved in: 2 weeks  1. Independent in HEP and progression per patient tolerance, in order to prevent re-injury. []? Progressing: [x]? Met: []? Not Met: []? Adjusted  2. Patient will have a decrease in pain to <= 3/10 in left knee and right leg to  facilitate improvement in movement, function, and ADLs as indicated by Functional Deficits. [x]? Progressing: []? Met: []? Not Met: []? Adjusted     Long Term Goals: To be achieved in:  5 weeks or discharge   1. Disability index score of 20% or less for the LEFS to assist with reaching prior level of function. [x]? Progressing: []? Met: []? Not Met: []? Adjusted  2. Patient will demonstrate increased AROM to 120* left knee flexion to allow for proper joint functioning as indicated by patients Functional Deficits. [x]? Progressing: []? Met: []? Not Met: []? Adjusted  3. Patient will demonstrate an increase in Strength to at least  4+/5 in bilateral LE as well as good proximal hip strength and control to allow for proper functional mobility as indicated by patients Functional Deficits. [x]? Progressing: []? Met: []? Not Met: []? Adjusted  4. Patient will return to functional activities including ascending/descending steps in a step-to pattern without increased symptoms or restriction. [x]? Progressing: []? Met: []? Not Met: []? Adjusted  5.  Patient will demonstrate the ability to ambulate without evidence of antalgia and normal step/stride length without A.D. in order to resume her prior functional activities without increased symptoms or restriction  [x]? Progressing: []? Met: []? Not Met: []? Adjusted      Overall Progression Towards Functional goals/ Treatment Progress Update:  [x] Patient is progressing as expected towards functional goals listed. [] Progression is slowed due to complexities/Impairments listed. [] Progression has been slowed due to co-morbidities. [] Plan just implemented, too soon to assess goals progression <30days   [] Goals require adjustment due to lack of progress  [] Patient is not progressing as expected and requires additional follow up with physician  [] Other    Persisting Functional Limitations/Impairments:  []Sleeping []Sitting               [x]Standing [x]Transfers        [x]Walking [x]Kneeling               [x]Stairs [x]Squatting / bending   [x]ADLs [x]Reaching  []Lifting  []Housework  []Driving []Job related tasks  []Sports/Recreation []Other:        ASSESSMENT: Patient was challenged with current level of repetitions therefore not increased. Patient able to add in new stretches without increase in pain. Patient needed vcs to perform forward step ups with improved quad activation. Increase reps on next aquatic session as tolerated. Treatment/Activity Tolerance:  [x] Patient able to complete tx  [] Patient limited by fatigue  [x] Patient limited by pain  [] Patient limited by other medical complications  [] Other:     Prognosis: [x] Good [x] Fair  [] Poor    Patient Requires Follow-up: [x] Yes  [] No    Plan for next treatment session:  1 aquatic and 1 land session per week to work on left knee as well as exacerbation of right leg/low back deficits that will impact tolerance to left knee and strengthening.      PLAN: See yung. PT 2x/week x 5 weeks to include both land and aquatic therapy    [x] Continue per plan of care [] Alter current plan (see comments)  [] Plan of care initiated [] Hold pending MD visit [] Discharge    Electronically signed by: Reginaldo Contreras, PT , MSPT      Note: If patient does not return for scheduled/ recommended follow up visits, this note will serve as a discharge from care along with most recent update on progress.

## 2022-03-22 ENCOUNTER — TELEPHONE (OUTPATIENT)
Dept: ORTHOPEDIC SURGERY | Age: 72
End: 2022-03-22

## 2022-03-23 ENCOUNTER — HOSPITAL ENCOUNTER (OUTPATIENT)
Dept: PHYSICAL THERAPY | Age: 72
Setting detail: THERAPIES SERIES
Discharge: HOME | End: 2022-03-23
Payer: MEDICARE

## 2022-03-23 PROCEDURE — 97110 THERAPEUTIC EXERCISES: CPT

## 2022-03-23 PROCEDURE — 97530 THERAPEUTIC ACTIVITIES: CPT

## 2022-03-23 NOTE — FLOWSHEET NOTE
[facility] Physical Therapy  Phone: [phone]   Fax: [phone]          Physical Therapy Daily Treatment Note  Date:  3/23/2022    Patient Name:  China Mcduffie    :  1950  MRN: [id number]  Medical/Treatment Diagnosis Information:  · Diagnosis: Chronic pain of left knee M25.562, G89.29  · Treatment Diagnosis: Decreased standing/ambulation tolerance, difficulty with stairs, squatting, decreased functional activity tolerance  Insurance/Certification information:  PT Insurance Information: Medicare  Physician Information:  Referring Practitioner: Ute Mercado  Plan of care signed (Y/N): []  Yes [x]  No     Date of Patient follow up with Physician:      Progress Report: [x]  Yes  []  No     Date Range for reporting period:   Beginnin/16   POC: 3/16/22  Ending:      Progress report due (10 Rx/or 30 days whichever is less): visit #  or      Recertification due (POC duration/ or 90 days whichever is less): visit #    Visit # Insurance Allowable Auth required? Date Range      [place] []  Yes  []  No        Latex Allergy:  [x]NO      []YES  Preferred Language for Healthcare:   [x]English       []other:    Functional Scale:        Date assessed:  LEFS: raw score = 33/80; dysfunction = %  21  LEFS: 23; 60-79%      22  LEFS: raw score = 36; dysfunction 55%   3/16/22    Pain level:  4/10 left knee;      SUBJECTIVE: Pt reports knee has been worse the past few days because of the rain. Pt felt better after stretching in the pool. Pt reports she now has to layer up before putting on ice pack she bought because she had a ice burn on her knee despite extensive education lpv about how to appropriately put layers between skin and ice pack at home. Pt has been helping take care of mother as her sister was diagnosed with cancer. Pt took mother to [address] yesterday and they did a lot of walking/standing with some stiffness.          OBJECTIVE:     3/9: pt ambulates to NuStep without cane and slow gait speed. 3/16: See POC for       RESTRICTIONS/PRECAUTIONS: SI injection on 2/23    Exercises/Interventions:     Therapeutic Exercises (57699) Resistance / level Sets/sec Reps Notes   Nustep  Bike   1.0   X 5 min X 7 mins    3/23          IB, HR/TR  30\"  X 2   2 x 10     HS/HF Stretch       Prone Knee Extension  1/3\" H X 15 B 2/24: pt enjoys this, felt a stretch on LLE. 3/9 added hold   Step Ups Fwd 6\" 2 X 10 B 2/24: cont npv   Step Ups Lat 6\" 2/24: cont npv   LAQ 2/24   SLR Flex    Clamshells 2/24   Quad Set 2/24   Glute Bridges  1 X 15 3/23   T.G. Squats  Green band above knee  Ball Squeeze 2    2 X 10    X 15 3/9, 3/23: pt with increased fatigue today   Leg Press       PN completed - see above for ROM, MMT, LEFS scores 3/16             Therapeutic Activities (24349)       TrA: seated hip ADD ball squeeze w/ GH DB flex 2/24   Sit<>stands 2/24: from mat table, cues for upright posture once standing   Lateral Band Walk  Monster Walk Fwd/Bwd Riley TB  [place] 3 10ft 3/23                 Neuromuscular Re-ed (39230)              3-way hip, band     Hip abd is part of HEP           Tandem balance Airex     SLS     Airex Hip Abduction                     Shuttle               Manual Intervention (09963)                                                   AquaticTherapy Dates of Service: 2/18, 2/21, 3/7, 3/21  Aquatic Visits Exercises/Activities:   Transfers:  [x] Stairs   [] Ramp  [] Chair Lift   % Immersion:            Ambulation/ Warm up:   UE Exercises:       Forward  x2 Shoulder Shrugs      Lateral   x2 Shoulder Circles      Retro  X 2 Scapular Retraction      [place]    Push Downs      Heel/Toe Walking    Punching       Rowing       Elbow Flex/Ext       Shldr Flex/Ext       [employer] aBd/aDd    LE Exercises:  Shldr Horiz aBd/aDd    HR/TR x20 Shldr IR/ER    Marches x20 Arm Circles    Squats X20 PNF Diagonals    Hamstring Curls x20 Wall Push Ups    Hip Flexion (SLR) x20     Hip aBduction (SLR) x20     Hip Extension (SLR) x20      Hip aDduction (SLR)      Hip Circles x Functional:    Hip IR/Er  Step up forward x10 B   Hip Hikes x Step up lateral  x     Step down        Lunges Forward      Lunges Retro      Lunges Lateral     Balance:        SLS  2x15\"      Tandem Stance X 30\"      NBOS eyes open Seated:     NBOS eyes closed Ankle pumps     Hand to Opposite Knee x Ankle Circles     Fwd Step ups to SLS  Knee Flex/Ext x10   Lateral Step ups to SLS  Hip aBd/aDd    Stop/Go Gait   Bicycle       Ankle DF/PF      Ankle Inv/Ev    Stretching:       Gastroc/Soleus - wall 3x30\"     Hamstring - (seated) 2x30\" Deep Water:    Knee Flex Stretch  Jog    Piriformis  x Noodle Hang x   Hip Flexor X20\" X3 L/R standing on step Traction at Wall    SKTC x     DKTC  x     ITB x Cool Down:    Quad  Fwd Walking x   Mid Back   Lat Walking x   UT  Retro Walking    Post Shoulder  Noodle float x   Ladder Pull      Pec Stretch            Core: Other:    TrA set x     Pelvic Tilts x     Multifidi Walk outs c paddle x     PNF Chop/Lifts                          Aquatic Abbreviation Key  B= Belt DB= Dumbells T= Theratube   H= Hydrotone N= Noodles W= Weights   P= Paddles S= Speedo equipment K= Kickboard              Modalities: 12/30, 3/9  CP to left knee x 10 mins    Pt. Education:  - Gave pt tour of pool, explained how to use lockers, what to wear, that it would be in group setting, and showed pt aquatic equipment.  -patient educated on diagnosis, prognosis and expectations for rehab  -all patient questions were answered    Home Exercise Program:      Access Code: [email or web address]  URL: [email or web address]  Date: 11/24/2021  Prepared by: Luma Egan     Exercises  Supine Quad Set - 2 x daily - 7 x weekly - 1-2 sets - 10 reps - 5 secs hold  Supine Active Straight Leg Raise - 2 x daily - 7 x weekly - 2-3 sets - 10 reps  Supine Heel Slide - 2 x daily - 7 x weekly - 2-3 sets - 10 reps  Clamshell with Resistance - 2 x daily - 7 x weekly - 2-3 sets - 10 reps  Side Stepping with Resistance at Ankles - 2 x daily - 7 x weekly - 2-3 sets - 10 reps  Standing Hip Abduction with Resistance at Ankles and Counter Support - 2 x daily - 7 x weekly - 2-3 sets - 10 reps    Therapeutic Exercise and NMR EXR  [x] (37468) Provided verbal/tactile cueing for activities related to strengthening, flexibility, endurance, ROM for improvements in  [x] LE / Lumbar: LE, proximal hip, and core control with self care, mobility, lifting, ambulation. [] UE / Cervical: cervical, postural, scapular, scapulothoracic and UE control with self care, reaching, carrying, lifting, house/yardwork, driving, computer work.  [] (81440) Provided verbal/tactile cueing for activities related to improving balance, coordination, kinesthetic sense, posture, motor skill, proprioception to assist with   [] LE / lumbar: LE, proximal hip, and core control in self care, mobility, lifting, ambulation and eccentric single leg control. [] UE / cervical: cervical, scapular, scapulothoracic and UE control with self care, reaching, carrying, lifting, house/yardwork, driving, computer work.        [] (09347) Aquatic therapy with therapeutic exercise. Provided verbal and tactile cueing for activities related to strengthening, flexibility, endurance, ROM for improvements in  [] LE / lumbar: LE, proximal hip, and core control in self care, mobility, lifting, ambulation and eccentric single leg control.    [] UE / cervical: cervical, scapular, scapulothoracic and UE control with self care, reaching, carrying, lifting, house/yardwork, driving, computer work.   [] (29365) Therapist is in constant attendance of 2 or more patients providing skilled therapy interventions, but not providing any significant amount of measurable one-on-one time to either patient, for improvements in  [] LE / lumbar: LE, proximal hip, and core control in self care, mobility, lifting, ambulation and eccentric single leg control. [] UE / cervical: cervical, scapular, scapulothoracic and UE control with self care, reaching, carrying, lifting, house/yardwork, driving, computer work. NMR and Therapeutic Activities:    [] (00795 or 37545) Provided verbal/tactile cueing for activities related to improving balance, coordination, kinesthetic sense, posture, motor skill, proprioception and motor activation to allow for proper function of   [] LE: / Lumbar core, proximal hip and LE with self care and ADLs  [] UE / Cervical: cervical, postural, scapular, scapulothoracic and UE control with self care, carrying, lifting, driving, computer work.   [] (23095) Gait Re-education- Provided training and instruction to the patient for proper LE, core and proximal hip recruitment and positioning and eccentric body weight control with ambulation re-education including up and down stairs     Home Management Training / Self Care:  [] (16533) Provided self-care/home management training related to activities of daily living and compensatory training, and/or use of adaptive equipment for improvement with: ADLs and compensatory training, meal preparation, safety procedures and instruction in use of adaptive equipment, including bathing, grooming, dressing, personal hygiene, basic household cleaning and chores.      Home Exercise Program:    [] (84255) Reviewed/Progressed HEP activities related to strengthening, flexibility, endurance, ROM of   [] LE / Lumbar: core, proximal hip and LE for functional self-care, mobility, lifting and ambulation/stair navigation   [] UE / Cervical: cervical, postural, scapular, scapulothoracic and UE control with self care, reaching, carrying, lifting, house/yardwork, driving, computer work  [] (34087)Reviewed/Progressed HEP activities related to improving balance, coordination, kinesthetic sense, posture, motor skill, proprioception of   [] LE: core, proximal hip and LE for self care, mobility, lifting, and ambulation/stair navigation    [] UE / Cervical: cervical, postural,  scapular, scapulothoracic and UE control with self care, reaching, carrying, lifting, house/yardwork, driving, computer work    Manual Treatments:  PROM / STM / Oscillations-Mobs:  G-I, II, III, IV (PA's, Inf., Post.)  [] (75376) Provided manual therapy to mobilize LE, proximal hip and/or LS spine soft tissue/joints for the purpose of modulating pain, promoting relaxation,  increasing ROM, reducing/eliminating soft tissue swelling/inflammation/restriction, improving soft tissue extensibility and allowing for proper ROM for normal function with   [] LE / lumbar: self care, mobility, lifting and ambulation. [] UE / Cervical: self care, reaching, carrying, lifting, house/yardwork, driving, computer work. Modalities:  [] (41301) Vasopneumatic compression: Utilized vasopneumatic compression to decrease edema / swelling for the purpose of improving mobility and quad tone / recruitment which will allow for increased overall function including but not limited to self-care, transfers, ambulation, and ascending / descending stairs.     [] (45853) Aquatic therapy with therapeutic exercise. Provided verbal and tactile cueing for activities related to strengthening, flexibility, endurance, ROM for improvements in  [] LE / lumbar: LE, proximal hip, and core control in self care, mobility, lifting, ambulation and eccentric single leg control. [] UE / cervical: cervical, scapular, scapulothoracic and UE control with self care, reaching, carrying, lifting, house/yardwork, driving, computer work.   [] (04714) Therapist is in constant attendance of 2 or more patients providing skilled therapy interventions, but not providing any significant amount of measurable one-on-one time to either patient, for improvements in  [] LE / lumbar: LE, proximal hip, and core control in self care, mobility, lifting, ambulation and eccentric single leg control.    [] UE / cervical: cervical, scapular, scapulothoracic and UE control with self care, reaching, carrying, lifting, house/yardwork, driving, computer work. Charges:  Timed Code Treatment Minutes: 39   Total Treatment Minutes: 39     [] EVAL - LOW (30233)   [] EVAL - MOD (87530)  [] EVAL - HIGH (10542)  [] RE-EVAL (26628)  [x] TE(50504) x 2       [] Ionto  [] NMR (71015) x       [] Vaso  [] Manual (73793) x       [] Ultrasound  [x] TA x   1     [] Mech Traction (80960)  [] Aquatic Therapy x 1    [] ES (un) (53479):   [] Home Management Training x  [] ES(attended) (89641)   [] Dry Needling 1-2 muscles (38736):  [] Dry Needling 3+ muscles (338367)  [] Group: 1     [] Other:     GOALS:     Patient stated goal: learn new exercises to maintain, help with pain relief, go up/down steps better, walk without feeling of instability and not use a cane at all.   []? Progressing: []? Met: []? Not Met: []? Adjusted     Therapist goals for Patient:   Short Term Goals: To be achieved in: 2 weeks  1. Independent in HEP and progression per patient tolerance, in order to prevent re-injury. []? Progressing: [x]? Met: []? Not Met: []? Adjusted  2. Patient will have a decrease in pain to <= 3/10 in left knee and right leg to  facilitate improvement in movement, function, and ADLs as indicated by Functional Deficits. [x]? Progressing: []? Met: []? Not Met: []? Adjusted     Long Term Goals: To be achieved in:  5 weeks or discharge   1. Disability index score of 20% or less for the LEFS to assist with reaching prior level of function. [x]? Progressing: []? Met: []? Not Met: []? Adjusted  2. Patient will demonstrate increased AROM to 120* left knee flexion to allow for proper joint functioning as indicated by patients Functional Deficits. [x]? Progressing: []? Met: []? Not Met: []? Adjusted  3.  Patient will demonstrate an increase in Strength to at least  4+/5 in bilateral LE as well as good proximal hip strength and control to allow for proper functional mobility as indicated by patients Functional Deficits. [x]? Progressing: []? Met: []? Not Met: []? Adjusted  4. Patient will return to functional activities including ascending/descending steps in a step-to pattern without increased symptoms or restriction. [x]? Progressing: []? Met: []? Not Met: []? Adjusted  5. Patient will demonstrate the ability to ambulate without evidence of antalgia and normal step/stride length without A.D. in order to resume her prior functional activities without increased symptoms or restriction  [x]? Progressing: []? Met: []? Not Met: []? Adjusted      Overall Progression Towards Functional goals/ Treatment Progress Update:  [x] Patient is progressing as expected towards functional goals listed. [] Progression is slowed due to complexities/Impairments listed. [] Progression has been slowed due to co-morbidities. [] Plan just implemented, too soon to assess goals progression <30days   [] Goals require adjustment due to lack of progress  [] Patient is not progressing as expected and requires additional follow up with physician  [] Other    Persisting Functional Limitations/Impairments:  []Sleeping []Sitting               [x]Standing [x]Transfers        [x]Walking [x]Kneeling               [x]Stairs [x]Squatting / bending   [x]ADLs [x]Reaching  []Lifting  []Housework  []Driving []Job related tasks  []Sports/Recreation []Other:        ASSESSMENT: Pt continues to be challenged with current exercises. Pt quickly fatigues with CKC exercises. Pt needs minimal cuing for correct form and achieve good quad/glute activation. Continue to progress CKC functional exercises, improve mobility per pt tolerance.        Treatment/Activity Tolerance:  [x] Patient able to complete tx  [] Patient limited by fatigue  [x] Patient limited by pain  [] Patient limited by other medical complications  [] Other:     Prognosis: [x] Good [x] Fair  [] Poor    Patient Requires Follow-up: [x] Yes  [] No    Plan for next treatment session:  1 aquatic and 1 land session per week to work on left knee as well as exacerbation of right leg/low back deficits that will impact tolerance to left knee and strengthening. PLAN: See eval. PT 2x/week x 5 weeks to include both land and aquatic therapy    [x] Continue per plan of care [] Alter current plan (see comments)  [] Plan of care initiated [] Hold pending MD visit [] Discharge    Electronically signed by: Nely Meza, RAVINDER   Therapist was present, directed the patient's care, made skilled judgement, and was responsible for assessment and treatment of the patient. Meche England, SPT      Note: If patient does not return for scheduled/ recommended follow up visits, this note will serve as a discharge from care along with most recent update on progress.

## 2022-03-25 ENCOUNTER — CLINICAL DOCUMENTATION (OUTPATIENT)
Dept: SPIRITUAL SERVICES | Age: 72
End: 2022-03-25

## 2022-03-25 NOTE — PROGRESS NOTES
Advance Care Planning   Advance Care Planning Note  Ambulatory Spiritual Care Services    Date:  3/25/2022    Received request from patient per referral from [address] [place] [address]  Consultation conversation participants:   Patient who understands ACP conversation     Goals of ACP Conversation:  Discuss advance care planning documents    Health Care Decision Makers:    Primary:  Megan Galeano, Daughter, [phone]  Secondary:  Jaiden Rodriguez, [phone]     Summary:  Completed [address]    Advance Care Planning Documents (Patient Wishes)  Currently on file:   Healthcare Power of /Advance Directive Appointment of Health Care Agent  Living Will/Advance Directive    Assessment:   Patient initiated and scheduled visit with  at [facility], to discuss and complete Advance Directives as referred by [address] [place] [address]. Visited with patient who mentioned her physician's recommendation that she complete Advance Directive. \"I've wanted to do this for a while,\" patient said. Spiritual support provided through active listening and blessing as patient shared her spiritual journey. Recommend closure of referral as Advance Directive documents were completed. Interventions:  Provided education on documents for clarity and greater understanding  Assisted in the completion of documents according to patient's wishes at this time    Outcomes:  New advance directive completed. Returned original document(s) to patient, as well as copies for distribution to appointed agents  Copy of advance directive given to staff to scan into medical record. Routed ACP note to attending provider or other IDT member.   Teach Back Method used to verify the patient's and/or Healthcare Decision Maker's understanding of key information in the advance directive documents    Patient / Healthcare Decision Maker Instructions:  Return a copy of Advance Directive Form(s) to medical office.     Electronically signed by Sylvain Garcia on 3/25/2022 at 3:39 PM.

## 2022-03-28 ENCOUNTER — HOSPITAL ENCOUNTER (OUTPATIENT)
Dept: PHYSICAL THERAPY | Age: 72
Setting detail: THERAPIES SERIES
Discharge: HOME | End: 2022-03-28
Payer: MEDICARE

## 2022-03-28 PROCEDURE — 97113 AQUATIC THERAPY/EXERCISES: CPT

## 2022-03-28 PROCEDURE — 97150 GROUP THERAPEUTIC PROCEDURES: CPT

## 2022-03-28 NOTE — FLOWSHEET NOTE
[address] [facility] Physical Therapy  Phone: [phone]   Fax: [phone]          Physical Therapy Daily Treatment Note  Date:  3/28/2022    Patient Name:  Estuardo Jackson    :  1950  MRN: [id number]  Medical/Treatment Diagnosis Information:  · Diagnosis: Chronic pain of left knee M25.562, G89.29  · Treatment Diagnosis: Decreased standing/ambulation tolerance, difficulty with stairs, squatting, decreased functional activity tolerance  Insurance/Certification information:  PT Insurance Information: Medicare  Physician Information:  Referring Practitioner: Rhys Tomlinson  Plan of care signed (Y/N): []  Yes [x]  No     Date of Patient follow up with Physician:      Progress Report: [x]  Yes  []  No     Date Range for reporting period:   Beginnin/16   POC: 3/16/22  Ending:      Progress report due (10 Rx/or 30 days whichever is less): visit #  or      Recertification due (POC duration/ or 90 days whichever is less): visit #    Visit # Insurance Allowable Auth required? Date Range   3/6   [place] []  Yes  []  No        Latex Allergy:  [x]NO      []YES  Preferred Language for Healthcare:   [x]English       []other:    Functional Scale:        Date assessed:  LEFS: raw score = 33/80; dysfunction = %  21  LEFS: 23; 60-79%      22  LEFS: raw score = 36; dysfunction 55%   3/16/22    Pain level:  4/10 left knee;      SUBJECTIVE: Pt reports her L knee is sore today with the colder weather. Stretches in the pool LV felt good. OBJECTIVE:     3/9: pt ambulates to NuStep without cane and slow gait speed. 3/16: See POC for       RESTRICTIONS/PRECAUTIONS: SI injection on     Exercises/Interventions:     Therapeutic Exercises (70670) Resistance / level Sets/sec Reps Notes   Nustep  Bike   1.0   X 5 min X 7 mins    3/23          IB, HR/TR  30\"  X 2   2 x 10     HS/HF Stretch       Prone Knee Extension  1/3\" H X 15 B : pt enjoys this, felt a stretch on LLE. 3/9 added hold   Step Ups Fwd 6\" 2 X 10 B 2/24: cont npv   Step Ups Lat 6\" 2/24: cont npv   LAQ 2/24   SLR Flex    Clamshells 2/24   Quad Set 2/24   Glute Bridges  1 X 15 3/23   T.G. Squats  Green band above knee  Ball Squeeze 2    2 X 10    X 15 3/9, 3/23: pt with increased fatigue today   Leg Press       PN completed - see above for ROM, MMT, LEFS scores 3/16             Therapeutic Activities (35685)       TrA: seated hip ADD ball squeeze w/ GH DB flex 2/24   Sit<>stands 2/24: from mat table, cues for upright posture once standing   Lateral Band Walk  Monster Walk Fwd/Bwd [facility]  [facility] 3 10ft 3/23                 Neuromuscular Re-ed (55051)              3-way hip, band     Hip abd is part of HEP           Tandem balance Airex     SLS     Airex Hip Abduction                     Shuttle               Manual Intervention (32772)                                                   AquaticTherapy Dates of Service: 2/18, 2/21, 3/7, 3/21, 3/28  Aquatic Visits Exercises/Activities:   Transfers:  [x] Stairs   [] Ramp  [] Chair Lift   % Immersion:            Ambulation/ Warm up:   UE Exercises:       Forward  x2 Shoulder Shrugs      Lateral   x2 Shoulder Circles      Retro  X 2 Scapular Retraction      [place]    Push Downs      Heel/Toe Walking    Punching       Rowing       Elbow Flex/Ext       Shldr Flex/Ext       [employer] aBd/aDd    LE Exercises:  Shldr Horiz aBd/aDd    HR/TR x20 Shldr IR/ER    Marches x20 Arm Circles    Squats X20 PNF Diagonals    Hamstring Curls x20 Wall Push Ups    Hip Flexion (SLR) x20     Hip aBduction (SLR) x20     Hip Extension (SLR) x20      Hip aDduction (SLR)      Hip Circles x Functional:    Hip IR/Er  Step up forward x20 B   Hip Hikes x Step up lateral  x     Step down  X 10 B (pain with LLE on step)      Lunges Forward      Lunges Retro      Lunges Lateral     Balance:        SLS  2x15\" B      Tandem Stance X 20\"      NBOS eyes open Seated:     NBOS eyes closed Ankle pumps     Hand to Opposite Knee x Ankle Circles     Fwd Step ups to SLS  Knee Flex/Ext    Lateral Step ups to SLS  Hip aBd/aDd    Stop/Go Gait   Bicycle       Ankle DF/PF      Ankle Inv/Ev    Stretching:       Gastroc/Soleus - lunge 2x30\"     Hamstring - (seated) 2x30\" Deep Water:    Knee Flex Stretch  Jog    Piriformis  x Noodle Hang x   Hip Flexor  Traction at Wall    SKTC x     DKTC  x     ITB x Cool Down:    Quad  Fwd Walking x   Mid Back   Lat Walking x   UT  Retro Walking    Post Shoulder  Noodle float x   Ladder Pull      Pec Stretch            Core: Other:    TrA set x     Pelvic Tilts x     Multifidi Walk outs c paddle x     PNF Chop/Lifts                          Aquatic Abbreviation Key  B= Belt DB= Dumbells T= Theratube   H= Hydrotone N= Noodles W= Weights   P= Paddles S= Speedo equipment K= Kickboard              Modalities: 12/30, 3/9  CP to left knee x 10 mins    Pt. Education:  - Gave pt tour of pool, explained how to use lockers, what to wear, that it would be in group setting, and showed pt aquatic equipment.  -patient educated on diagnosis, prognosis and expectations for rehab  -all patient questions were answered    Home Exercise Program:      Access Code: [email or web address]  URL: [email or web address]  Date: 11/24/2021  Prepared by: Elvira Pan     Exercises  Supine Quad Set - 2 x daily - 7 x weekly - 1-2 sets - 10 reps - 5 secs hold  Supine Active Straight Leg Raise - 2 x daily - 7 x weekly - 2-3 sets - 10 reps  Supine Heel Slide - 2 x daily - 7 x weekly - 2-3 sets - 10 reps  Clamshell with Resistance - 2 x daily - 7 x weekly - 2-3 sets - 10 reps  Side Stepping with Resistance at Ankles - 2 x daily - 7 x weekly - 2-3 sets - 10 reps  Standing Hip Abduction with Resistance at Ankles and Counter Support - 2 x daily - 7 x weekly - 2-3 sets - 10 reps    Therapeutic Exercise and NMR EXR  [] (87017) Provided verbal/tactile cueing for activities related to strengthening, flexibility, endurance, ROM for improvements in  [] LE / Lumbar: LE, proximal hip, and core control with self care, mobility, lifting, ambulation. [] UE / Cervical: cervical, postural, scapular, scapulothoracic and UE control with self care, reaching, carrying, lifting, house/yardwork, driving, computer work.  [] (24599) Provided verbal/tactile cueing for activities related to improving balance, coordination, kinesthetic sense, posture, motor skill, proprioception to assist with   [] LE / lumbar: LE, proximal hip, and core control in self care, mobility, lifting, ambulation and eccentric single leg control. [] UE / cervical: cervical, scapular, scapulothoracic and UE control with self care, reaching, carrying, lifting, house/yardwork, driving, computer work. [x] (62047) Aquatic therapy with therapeutic exercise. Provided verbal and tactile cueing for activities related to strengthening, flexibility, endurance, ROM for improvements in  [x] LE / lumbar: LE, proximal hip, and core control in self care, mobility, lifting, ambulation and eccentric single leg control. [] UE / cervical: cervical, scapular, scapulothoracic and UE control with self care, reaching, carrying, lifting, house/yardwork, driving, computer work. [x] (53546) Therapist is in constant attendance of 2 or more patients providing skilled therapy interventions, but not providing any significant amount of measurable one-on-one time to either patient, for improvements in  [] LE / lumbar: LE, proximal hip, and core control in self care, mobility, lifting, ambulation and eccentric single leg control. [] UE / cervical: cervical, scapular, scapulothoracic and UE control with self care, reaching, carrying, lifting, house/yardwork, driving, computer work.        NMR and Therapeutic Activities:    [] (81589 or 56867) Provided verbal/tactile cueing for activities related to improving balance, coordination, kinesthetic sense, posture, motor skill, proprioception and motor activation to allow for proper function of   [] LE: / Lumbar core, proximal hip and LE with self care and ADLs  [] UE / Cervical: cervical, postural, scapular, scapulothoracic and UE control with self care, carrying, lifting, driving, computer work.   [] (33023) Gait Re-education- Provided training and instruction to the patient for proper LE, core and proximal hip recruitment and positioning and eccentric body weight control with ambulation re-education including up and down stairs     Home Management Training / Self Care:  [] (39349) Provided self-care/home management training related to activities of daily living and compensatory training, and/or use of adaptive equipment for improvement with: ADLs and compensatory training, meal preparation, safety procedures and instruction in use of adaptive equipment, including bathing, grooming, dressing, personal hygiene, basic household cleaning and chores.      Home Exercise Program:    [] (48078) Reviewed/Progressed HEP activities related to strengthening, flexibility, endurance, ROM of   [] LE / Lumbar: core, proximal hip and LE for functional self-care, mobility, lifting and ambulation/stair navigation   [] UE / Cervical: cervical, postural, scapular, scapulothoracic and UE control with self care, reaching, carrying, lifting, house/yardwork, driving, computer work  [] (71821)Reviewed/Progressed HEP activities related to improving balance, coordination, kinesthetic sense, posture, motor skill, proprioception of   [] LE: core, proximal hip and LE for self care, mobility, lifting, and ambulation/stair navigation    [] UE / Cervical: cervical, postural,  scapular, scapulothoracic and UE control with self care, reaching, carrying, lifting, house/yardwork, driving, computer work    Manual Treatments:  PROM / STM / Oscillations-Mobs:  G-I, II, III, IV (PA's, Inf., Post.)  [] (52230) Provided manual therapy to mobilize LE, proximal hip and/or LS spine soft tissue/joints for the purpose of modulating pain, promoting relaxation,  increasing ROM, reducing/eliminating soft tissue swelling/inflammation/restriction, improving soft tissue extensibility and allowing for proper ROM for normal function with   [] LE / lumbar: self care, mobility, lifting and ambulation. [] UE / Cervical: self care, reaching, carrying, lifting, house/yardwork, driving, computer work. Modalities:  [] (41623) Vasopneumatic compression: Utilized vasopneumatic compression to decrease edema / swelling for the purpose of improving mobility and quad tone / recruitment which will allow for increased overall function including but not limited to self-care, transfers, ambulation, and ascending / descending stairs.     [] (79084) Aquatic therapy with therapeutic exercise. Provided verbal and tactile cueing for activities related to strengthening, flexibility, endurance, ROM for improvements in  [] LE / lumbar: LE, proximal hip, and core control in self care, mobility, lifting, ambulation and eccentric single leg control. [] UE / cervical: cervical, scapular, scapulothoracic and UE control with self care, reaching, carrying, lifting, house/yardwork, driving, computer work.   [] (37701) Therapist is in constant attendance of 2 or more patients providing skilled therapy interventions, but not providing any significant amount of measurable one-on-one time to either patient, for improvements in  [] LE / lumbar: LE, proximal hip, and core control in self care, mobility, lifting, ambulation and eccentric single leg control. [] UE / cervical: cervical, scapular, scapulothoracic and UE control with self care, reaching, carrying, lifting, house/yardwork, driving, computer work.      Charges:  Timed Code Treatment Minutes: 15   Total Treatment Minutes: 52     [] EVAL - LOW (19498)   [] EVAL - MOD (88670)  [] EVAL - HIGH (33246)  [] RE-EVAL (70697)  [] DN(24915) x 2       [] Ionto  [] NMR (39325) x       [] Vaso  [] Manual (46524) x       [] Ultrasound  [] TA x   1     [] [facility] Traction (89613)  [x] Aquatic Therapy x 1    [] ES (un) (41200):   [] Home Management Training x  [] ES(attended) (05393)   [] Dry Needling 1-2 muscles (11098):  [] Dry Needling 3+ muscles (268084)  [x] Group: 1     [] Other:     GOALS:     Patient stated goal: learn new exercises to maintain, help with pain relief, go up/down steps better, walk without feeling of instability and not use a cane at all.   []? Progressing: []? Met: []? Not Met: []? Adjusted     Therapist goals for Patient:   Short Term Goals: To be achieved in: 2 weeks  1. Independent in HEP and progression per patient tolerance, in order to prevent re-injury. []? Progressing: [x]? Met: []? Not Met: []? Adjusted  2. Patient will have a decrease in pain to <= 3/10 in left knee and right leg to  facilitate improvement in movement, function, and ADLs as indicated by Functional Deficits. [x]? Progressing: []? Met: []? Not Met: []? Adjusted     Long Term Goals: To be achieved in:  5 weeks or discharge   1. Disability index score of 20% or less for the LEFS to assist with reaching prior level of function. [x]? Progressing: []? Met: []? Not Met: []? Adjusted  2. Patient will demonstrate increased AROM to 120* left knee flexion to allow for proper joint functioning as indicated by patients Functional Deficits. [x]? Progressing: []? Met: []? Not Met: []? Adjusted  3. Patient will demonstrate an increase in Strength to at least  4+/5 in bilateral LE as well as good proximal hip strength and control to allow for proper functional mobility as indicated by patients Functional Deficits. [x]? Progressing: []? Met: []? Not Met: []? Adjusted  4. Patient will return to functional activities including ascending/descending steps in a step-to pattern without increased symptoms or restriction. [x]? Progressing: []? Met: []? Not Met: []? Adjusted  5.  Patient will demonstrate the ability to ambulate without evidence of antalgia and normal step/stride length without A.D. in order to resume her prior functional activities without increased symptoms or restriction  [x]? Progressing: []? Met: []? Not Met: []? Adjusted      Overall Progression Towards Functional goals/ Treatment Progress Update:  [x] Patient is progressing as expected towards functional goals listed. [] Progression is slowed due to complexities/Impairments listed. [] Progression has been slowed due to co-morbidities. [] Plan just implemented, too soon to assess goals progression <30days   [] Goals require adjustment due to lack of progress  [] Patient is not progressing as expected and requires additional follow up with physician  [] Other    Persisting Functional Limitations/Impairments:  []Sleeping []Sitting               [x]Standing [x]Transfers        [x]Walking [x]Kneeling               [x]Stairs [x]Squatting / bending   [x]ADLs [x]Reaching  []Lifting  []Housework  []Driving []Job related tasks  []Sports/Recreation []Other:        ASSESSMENT: initiated fwd step downs this date as pt reports difficulty going down basement stairs and needing to go down sideways. Discussed initiating on land with shorter stair. VC for smaller ROM with 3 way kick to avoid compensation with trunk. Pt reports feeling good at end of session with no increased pain. Treatment/Activity Tolerance:  [x] Patient able to complete tx  [] Patient limited by fatigue  [x] Patient limited by pain  [] Patient limited by other medical complications  [] Other:     Prognosis: [x] Good [x] Fair  [] Poor    Patient Requires Follow-up: [x] Yes  [] No    Plan for next treatment session:  1 aquatic and 1 land session per week to work on left knee as well as exacerbation of right leg/low back deficits that will impact tolerance to left knee and strengthening.      PLAN: See eval. PT 2x/week x 5 weeks to include both land and aquatic therapy    [x] Continue per plan of care [] Alter current plan (see comments)  [] Plan of care initiated [] Hold pending MD visit [] Discharge    Electronically signed by: Ken Matthew PT, DPT         Note: If patient does not return for scheduled/ recommended follow up visits, this note will serve as a discharge from care along with most recent update on progress.

## 2022-03-30 ENCOUNTER — HOSPITAL ENCOUNTER (OUTPATIENT)
Dept: PHYSICAL THERAPY | Age: 72
Setting detail: THERAPIES SERIES
Discharge: HOME | End: 2022-03-30
Payer: MEDICARE

## 2022-03-30 PROCEDURE — 97110 THERAPEUTIC EXERCISES: CPT

## 2022-03-30 PROCEDURE — 97530 THERAPEUTIC ACTIVITIES: CPT

## 2022-03-30 NOTE — FLOWSHEET NOTE
[facility] Physical Therapy  Phone: [phone]   Fax: [phone]          Physical Therapy Daily Treatment Note  Date:  3/30/2022    Patient Name:  Mony Rod    :  1950  MRN: [id number]  Medical/Treatment Diagnosis Information:  · Diagnosis: Chronic pain of left knee M25.562, G89.29  · Treatment Diagnosis: Decreased standing/ambulation tolerance, difficulty with stairs, squatting, decreased functional activity tolerance  Insurance/Certification information:  PT Insurance Information: Medicare  Physician Information:  Referring Practitioner: Sade Kaiser  Plan of care signed (Y/N): []  Yes [x]  No     Date of Patient follow up with Physician:      Progress Report: [x]  Yes  []  No     Date Range for reporting period:   Beginnin/16   POC: 3/16/22  Ending:      Progress report due (10 Rx/or 30 days whichever is less): visit #  or 3/21     Recertification due (POC duration/ or 90 days whichever is less): visit #    Visit # Insurance Allowable Auth required? Date Range      [place] []  Yes  []  No        Latex Allergy:  [x]NO      []YES  Preferred Language for Healthcare:   [x]English       []other:    Functional Scale:        Date assessed:  LEFS: raw score = 33/80; dysfunction = %  21  LEFS: 23; 60-79%      22  LEFS: raw score = 36; dysfunction 55%   3/16/22    Pain level:  3/10 left knee;      SUBJECTIVE: Pt has decreased stiffness in L knee. Otis great after her pool visit on Monday. OBJECTIVE:     3/9: pt ambulates to NuStep without cane and slow gait speed.   3/16: See POC for       RESTRICTIONS/PRECAUTIONS: SI injection on     Exercises/Interventions:     Therapeutic Exercises (88448) Resistance / level Sets/sec Reps Notes   Nustep  Bike   1.0   X 5 min    3/23          IB, HR/TR  30\"  X 2   2 x 10     HS/HF Stretch       Prone Knee Extension --> Standing TKE Orange TB /3\" H X 15 B : pt enjoys this, felt a stretch on LLE. 3/9 added hold   Step Ups Fwd 6\" 2 X 10 B 2/24: cont npv   Step Ups Lat 6\" 2/24: cont npv   LAQ 2/24   SLR Flex    Clamshells 2/24   Quad Set 2/24   Glute Bridges  3/23   T.G. Squats  3/9, 3/23: pt with increased fatigue today   Leg Press       PN completed - see above for ROM, MMT, LEFS scores 3/16             Therapeutic Activities (00400)       TrA: seated hip ADD ball squeeze w/ GH DB flex 2/24   Sit<>stands 2/24: from mat table, cues for upright posture once standing   Lateral Band Walk  Monster Walk Fwd/Bwd Florence TB  Florence 1 20ft 3/23, 3/30 cues for upright posture   Lateral Step downs 4\" 1 x15 B 3/30: tactile and verbal cues to avoid knee valgus          Neuromuscular Re-ed (37203)              3-way hip, band     Hip abd is part of HEP           Tandem balance Airex     SLS     Airex Hip Abduction                     Shuttle               Manual Intervention (24427)                                                   AquaticTherapy Dates of Service: 2/18, 2/21, 3/7, 3/21, 3/28  Aquatic Visits Exercises/Activities:   Transfers:  [x] Stairs   [] Ramp  [] Chair Lift   % Immersion:            Ambulation/ Warm up:   UE Exercises:       Forward  x2 Shoulder Shrugs      Lateral   x2 Shoulder Circles      Retro  X 2 Scapular Retraction      Cariocas    Push Downs      Heel/Toe Walking    Punching       Rowing       Elbow Flex/Ext       Shldr Flex/Ext       [employer] aBd/aDd    LE Exercises:  Shldr Horiz aBd/aDd    HR/TR x20 Shldr IR/ER    Marches x20 Arm Circles    Squats X20 PNF Diagonals    Hamstring Curls x20 Wall Push Ups    Hip Flexion (SLR) x20     Hip aBduction (SLR) x20     Hip Extension (SLR) x20      Hip aDduction (SLR)      Hip Circles x Functional:    Hip IR/Er  Step up forward x20 B   Hip Hikes x Step up lateral  x     Step down  X 10 B (pain with LLE on step)      Lunges Forward      Lunges Retro      Lunges Lateral     Balance:        SLS  2x15\" B      Tandem Stance X 20\"      NBOS eyes open Seated: NBOS eyes closed Ankle pumps     Hand to Opposite Knee x Ankle Circles     Fwd Step ups to SLS  Knee Flex/Ext    Lateral Step ups to SLS  Hip aBd/aDd    Stop/Go Gait   Bicycle       Ankle DF/PF      Ankle Inv/Ev    Stretching:       Gastroc/Soleus - lunge 2x30\"     Hamstring - (seated) 2x30\" Deep Water:    Knee Flex Stretch  Jog    Piriformis  x Noodle Hang x   Hip Flexor  Traction at Wall    SKTC x     DKTC  x     ITB x Cool Down:    Quad  Fwd Walking x   Mid Back   Lat Walking x   UT  Retro Walking    Post Shoulder  Noodle float x   Ladder Pull      Pec Stretch            Core: Other:    TrA set x     Pelvic Tilts x     Multifidi Walk outs c paddle x     PNF Chop/Lifts                          Aquatic Abbreviation Key  B= Belt DB= Dumbells T= Theratube   H= Hydrotone N= Noodles W= Weights   P= Paddles S= Speedo equipment K= Kickboard              Modalities: 12/30, 3/9  CP to left knee x 10 mins    Pt. Education:  - Gave pt tour of pool, explained how to use lockers, what to wear, that it would be in group setting, and showed pt aquatic equipment.  -patient educated on diagnosis, prognosis and expectations for rehab  -all patient questions were answered    Home Exercise Program:      Access Code: [email or web address]  URL: [email or web address]  Date: 11/24/2021  Prepared by: Lb Mantilla     Exercises  Supine Quad Set - 2 x daily - 7 x weekly - 1-2 sets - 10 reps - 5 secs hold  Supine Active Straight Leg Raise - 2 x daily - 7 x weekly - 2-3 sets - 10 reps  Supine Heel Slide - 2 x daily - 7 x weekly - 2-3 sets - 10 reps  Clamshell with Resistance - 2 x daily - 7 x weekly - 2-3 sets - 10 reps  Side Stepping with Resistance at Ankles - 2 x daily - 7 x weekly - 2-3 sets - 10 reps  Standing Hip Abduction with Resistance at Ankles and Counter Support - 2 x daily - 7 x weekly - 2-3 sets - 10 reps    Therapeutic Exercise and NMR EXR  [x] (81096) Provided verbal/tactile cueing for activities related to strengthening, flexibility, endurance, ROM for improvements in  [x] LE / Lumbar: LE, proximal hip, and core control with self care, mobility, lifting, ambulation. [] UE / Cervical: cervical, postural, scapular, scapulothoracic and UE control with self care, reaching, carrying, lifting, house/yardwork, driving, computer work.  [] (76394) Provided verbal/tactile cueing for activities related to improving balance, coordination, kinesthetic sense, posture, motor skill, proprioception to assist with   [] LE / lumbar: LE, proximal hip, and core control in self care, mobility, lifting, ambulation and eccentric single leg control. [] UE / cervical: cervical, scapular, scapulothoracic and UE control with self care, reaching, carrying, lifting, house/yardwork, driving, computer work.        [] (06870) Aquatic therapy with therapeutic exercise. Provided verbal and tactile cueing for activities related to strengthening, flexibility, endurance, ROM for improvements in  [] LE / lumbar: LE, proximal hip, and core control in self care, mobility, lifting, ambulation and eccentric single leg control. [] UE / cervical: cervical, scapular, scapulothoracic and UE control with self care, reaching, carrying, lifting, house/yardwork, driving, computer work.   [] (67260) Therapist is in constant attendance of 2 or more patients providing skilled therapy interventions, but not providing any significant amount of measurable one-on-one time to either patient, for improvements in  [] LE / lumbar: LE, proximal hip, and core control in self care, mobility, lifting, ambulation and eccentric single leg control. [] UE / cervical: cervical, scapular, scapulothoracic and UE control with self care, reaching, carrying, lifting, house/yardwork, driving, computer work.        NMR and Therapeutic Activities:    [] (49473 or 44627) Provided verbal/tactile cueing for activities related to improving balance, coordination, kinesthetic sense, posture, motor skill, proprioception and motor activation to allow for proper function of   [] LE: / Lumbar core, proximal hip and LE with self care and ADLs  [] UE / Cervical: cervical, postural, scapular, scapulothoracic and UE control with self care, carrying, lifting, driving, computer work. [x] (30185) Gait Re-education- Provided training and instruction to the patient for proper LE, core and proximal hip recruitment and positioning and eccentric body weight control with ambulation re-education including up and down stairs     Home Management Training / Self Care:  [] (16969) Provided self-care/home management training related to activities of daily living and compensatory training, and/or use of adaptive equipment for improvement with: ADLs and compensatory training, meal preparation, safety procedures and instruction in use of adaptive equipment, including bathing, grooming, dressing, personal hygiene, basic household cleaning and chores.      Home Exercise Program:    [] (90993) Reviewed/Progressed HEP activities related to strengthening, flexibility, endurance, ROM of   [] LE / Lumbar: core, proximal hip and LE for functional self-care, mobility, lifting and ambulation/stair navigation   [] UE / Cervical: cervical, postural, scapular, scapulothoracic and UE control with self care, reaching, carrying, lifting, house/yardwork, driving, computer work  [] (55923)Reviewed/Progressed HEP activities related to improving balance, coordination, kinesthetic sense, posture, motor skill, proprioception of   [] LE: core, proximal hip and LE for self care, mobility, lifting, and ambulation/stair navigation    [] UE / Cervical: cervical, postural,  scapular, scapulothoracic and UE control with self care, reaching, carrying, lifting, house/yardwork, driving, computer work    Manual Treatments:  PROM / STM / Oscillations-Mobs:  G-I, II, III, IV (PA's, Inf., Post.)  [] (34159) Provided manual therapy to mobilize LE, proximal hip and/or LS spine soft tissue/joints for the purpose of modulating pain, promoting relaxation,  increasing ROM, reducing/eliminating soft tissue swelling/inflammation/restriction, improving soft tissue extensibility and allowing for proper ROM for normal function with   [] LE / lumbar: self care, mobility, lifting and ambulation. [] UE / Cervical: self care, reaching, carrying, lifting, house/yardwork, driving, computer work. Modalities:  [] (80560) Vasopneumatic compression: Utilized vasopneumatic compression to decrease edema / swelling for the purpose of improving mobility and quad tone / recruitment which will allow for increased overall function including but not limited to self-care, transfers, ambulation, and ascending / descending stairs.     [] (62888) Aquatic therapy with therapeutic exercise. Provided verbal and tactile cueing for activities related to strengthening, flexibility, endurance, ROM for improvements in  [] LE / lumbar: LE, proximal hip, and core control in self care, mobility, lifting, ambulation and eccentric single leg control. [] UE / cervical: cervical, scapular, scapulothoracic and UE control with self care, reaching, carrying, lifting, house/yardwork, driving, computer work.   [] (41526) Therapist is in constant attendance of 2 or more patients providing skilled therapy interventions, but not providing any significant amount of measurable one-on-one time to either patient, for improvements in  [] LE / lumbar: LE, proximal hip, and core control in self care, mobility, lifting, ambulation and eccentric single leg control. [] UE / cervical: cervical, scapular, scapulothoracic and UE control with self care, reaching, carrying, lifting, house/yardwork, driving, computer work.      Charges:  Timed Code Treatment Minutes: 42   Total Treatment Minutes: 42     [] EVAL - LOW (20148)   [] EVAL - MOD (41693)  [] EVAL - HIGH (46184)  [] RE-EVAL (50106)  [x] RQ(39653) x 2       [] Ionto  [] NMR (84786) x [] Vaso  [] Manual (13070) x       [] Ultrasound  [x] TA x   1     [] Mech Traction (45603)  [] Aquatic Therapy x 1    [] ES (un) (14091):   [] Home Management Training x  [] ES(attended) (03540)   [] Dry Needling 1-2 muscles (15592):  [] Dry Needling 3+ muscles (077207)  [] Group: 1     [] Other:     GOALS:     Patient stated goal: learn new exercises to maintain, help with pain relief, go up/down steps better, walk without feeling of instability and not use a cane at all.   []? Progressing: []? Met: []? Not Met: []? Adjusted     Therapist goals for Patient:   Short Term Goals: To be achieved in: 2 weeks  1. Independent in HEP and progression per patient tolerance, in order to prevent re-injury. []? Progressing: [x]? Met: []? Not Met: []? Adjusted  2. Patient will have a decrease in pain to <= 3/10 in left knee and right leg to  facilitate improvement in movement, function, and ADLs as indicated by Functional Deficits. [x]? Progressing: []? Met: []? Not Met: []? Adjusted     Long Term Goals: To be achieved in:  5 weeks or discharge   1. Disability index score of 20% or less for the LEFS to assist with reaching prior level of function. [x]? Progressing: []? Met: []? Not Met: []? Adjusted  2. Patient will demonstrate increased AROM to 120* left knee flexion to allow for proper joint functioning as indicated by patients Functional Deficits. [x]? Progressing: []? Met: []? Not Met: []? Adjusted  3. Patient will demonstrate an increase in Strength to at least  4+/5 in bilateral LE as well as good proximal hip strength and control to allow for proper functional mobility as indicated by patients Functional Deficits. [x]? Progressing: []? Met: []? Not Met: []? Adjusted  4. Patient will return to functional activities including ascending/descending steps in a step-to pattern without increased symptoms or restriction. [x]? Progressing: []? Met: []? Not Met: []? Adjusted  5.  Patient will demonstrate the ability to ambulate without evidence of antalgia and normal step/stride length without A.D. in order to resume her prior functional activities without increased symptoms or restriction  [x]? Progressing: []? Met: []? Not Met: []? Adjusted      Overall Progression Towards Functional goals/ Treatment Progress Update:  [x] Patient is progressing as expected towards functional goals listed. [] Progression is slowed due to complexities/Impairments listed. [] Progression has been slowed due to co-morbidities. [] Plan just implemented, too soon to assess goals progression <30days   [] Goals require adjustment due to lack of progress  [] Patient is not progressing as expected and requires additional follow up with physician  [] Other    Persisting Functional Limitations/Impairments:  []Sleeping []Sitting               [x]Standing [x]Transfers        [x]Walking [x]Kneeling               [x]Stairs [x]Squatting / bending   [x]ADLs [x]Reaching  []Lifting  []Housework  []Driving []Job related tasks  []Sports/Recreation []Other:        ASSESSMENT: Pt had good tolerance to therapy session today. Pt struggled with lateral step downs and will benefit from continuing this to improve stair mechanics and improve proximal hip strength. Pt benefits from verbal cues throughout rep range to stay on task and maintain better form w/ exercises. Pt reports feeling good at end of session without increased pain. Pt will benefit from further strengthening exercises to improve independence at D/C to maximize therapeutic benefits.        Treatment/Activity Tolerance:  [x] Patient able to complete tx  [] Patient limited by fatigue  [x] Patient limited by pain  [] Patient limited by other medical complications  [] Other:     Prognosis: [x] Good [x] Fair  [] Poor    Patient Requires Follow-up: [x] Yes  [] No    Plan for next treatment session:  1 aquatic and 1 land session per week to work on left knee as well as exacerbation of right leg/low back deficits that will impact tolerance to left knee and strengthening. PLAN: See eval. PT 2x/week x 5 weeks to include both land and aquatic therapy    [x] Continue per plan of care [] Alter current plan (see comments)  [] Plan of care initiated [] Hold pending MD visit [] Discharge    Electronically signed by: Miguelito Greenwood, PT,  Therapist was present, directed the patient's care, made skilled judgement, and was responsible for assessment and treatment of the patient. Amanda Cordoba, SPT        Note: If patient does not return for scheduled/ recommended follow up visits, this note will serve as a discharge from care along with most recent update on progress.

## 2022-04-04 ENCOUNTER — APPOINTMENT (OUTPATIENT)
Dept: PHYSICAL THERAPY | Age: 72
End: 2022-04-04
Payer: MEDICARE

## 2022-04-06 ENCOUNTER — HOSPITAL ENCOUNTER (OUTPATIENT)
Dept: PHYSICAL THERAPY | Age: 72
Setting detail: THERAPIES SERIES
Discharge: HOME | End: 2022-04-06
Payer: MEDICARE

## 2022-04-06 PROCEDURE — 97110 THERAPEUTIC EXERCISES: CPT

## 2022-04-06 PROCEDURE — 97112 NEUROMUSCULAR REEDUCATION: CPT

## 2022-04-06 NOTE — FLOWSHEET NOTE
[address] [facility] Physical Therapy  Phone: [phone]   Fax: [phone]          Physical Therapy Daily Treatment Note  Date:  2022    Patient Name:  Luke Kolb    :  1950  MRN: [id number]  Medical/Treatment Diagnosis Information:  · Diagnosis: Chronic pain of left knee M25.562, G89.29  · Treatment Diagnosis: Decreased standing/ambulation tolerance, difficulty with stairs, squatting, decreased functional activity tolerance  Insurance/Certification information:  PT Insurance Information: Medicare  Physician Information:  Referring Practitioner: Liudmila Gates  Plan of care signed (Y/N): []  Yes [x]  No     Date of Patient follow up with Physician:      Progress Report: [x]  Yes  []  No     Date Range for reporting period:   Beginnin/16   POC: 3/16/22  Ending:      Progress report due (10 Rx/or 30 days whichever is less): visit #  or      Recertification due (POC duration/ or 90 days whichever is less): visit #    Visit # Insurance Allowable Auth required? Date Range   10/102/6   [place] []  Yes  []  No        Latex Allergy:  [x]NO      []YES  Preferred Language for Healthcare:   [x]English       []other:    Functional Scale:        Date assessed:  LEFS: raw score = 33/80; dysfunction = %  21  LEFS: 23; 60-79%      22  LEFS: raw score = 36; dysfunction 55%   3/16/22    Pain level:  5-6/10 left knee;      SUBJECTIVE: Patient reports her L knee is a little more sore (could be weather related). Pt feels much more confident with going down stairs. OBJECTIVE:     3/9: pt ambulates to NuStep without cane and slow gait speed.   3/16: See POC for       RESTRICTIONS/PRECAUTIONS: SI injection on     Exercises/Interventions:     Therapeutic Exercises (66359) Resistance / level Sets/sec Reps Notes   Nustep  Bike   1.0   X 5 min    3/23          IB, HR/TR  30\"  X 2   2 x 10     HS Stretch  30\" B     Prone Knee Extension --> Standing TKE [place] TB 2/24: pt enjoys this, felt a stretch on LLE. 3/9 added hold   Step Ups Fwd 6\" 1 X 10 B 4/6: Added SLS hold    Step Ups Lat 6\" 2/24: cont npv   LAQ 2/24   SLR Flex    Clamshells 2/24   Quad Set 2/24   Glute Bridges  3/23   T.G. Squats  3/9, 3/23: pt with increased fatigue today   Leg Press       PN completed - see above for ROM, MMT, LEFS scores 3/16             Therapeutic Activities (90480)       TrA: seated hip ADD ball squeeze w/ GH DB flex 2/24   Sit<>stands 2/24: from mat table, cues for upright posture once standing   Lateral Band Walk  Monster Walk Fwd/Bwd [place] TB  [place]  3/23, 3/30 cues for upright posture   Lateral Step downs 3/30: tactile and verbal cues to avoid knee valgus          Neuromuscular Re-ed (62105)              3-way hip, band     Hip abd is part of HEP           Tandem balance Airex     SLS     Airex Hip Abduction       Biodex Training:  - Motor control  - Random control  - Maze control   L5, Full pattern  L5  L5     2 min   62%  100%  100%     Low [place] speed, low level  Low level    Biodex Games:   - Maze  - Recovery rapids    L5  Easy   32 sec  4 min      Distance: 426 m Bottles: 18 Score: 866                  Manual Intervention (38759)                                                   AquaticTherapy Dates of Service: 2/18, 2/21, 3/7, 3/21, 3/28  Aquatic Visits Exercises/Activities:   Transfers:  [x] Stairs   [] Ramp  [] Chair Lift   % Immersion:            Ambulation/ Warm up:   UE Exercises:       Forward  x2 Shoulder Shrugs      Lateral   x2 Shoulder Circles      Retro  X 2 Scapular Retraction      [place]    Push Downs      Heel/Toe Walking    Punching       Rowing       Elbow Flex/Ext       Shldr Flex/Ext       [employer] aBd/aDd    LE Exercises:  Shldr Horiz aBd/aDd    HR/TR x20 Shldr IR/ER    Marches x20 Arm Circles    Squats X20 PNF Diagonals    Hamstring Curls x20 Wall Push Ups    Hip Flexion (SLR) x20     Hip aBduction (SLR) x20     Hip Extension (SLR) x20      Hip aDduction (SLR)      Hip Circles x Functional:    Hip IR/Er  Step up forward x20 B   Hip Hikes x Step up lateral  x     Step down  X 10 B (pain with LLE on step)      Lunges Forward      Lunges Retro      Lunges Lateral     Balance:        SLS  2x15\" B      Tandem Stance X 20\"      NBOS eyes open Seated:     NBOS eyes closed Ankle pumps     Hand to Opposite Knee x Ankle Circles     Fwd Step ups to SLS  Knee Flex/Ext    Lateral Step ups to SLS  Hip aBd/aDd    Stop/Go Gait   Bicycle       Ankle DF/PF      Ankle Inv/Ev    Stretching:       Gastroc/Soleus - lunge 2x30\"     Hamstring - (seated) 2x30\" [place]:    Knee Flex Stretch  Jog    Piriformis  x Noodle Hang x   Hip Flexor  Traction at Wall    SKTC x     DKTC  x     ITB x Cool Down:    Quad  Fwd Walking x   Mid Back   Lat Walking x   UT  Retro Walking    Post Shoulder  Noodle float x   Ladder Pull      Pec Stretch            Core: Other:    TrA set x     Pelvic Tilts x     Multifidi Walk outs c paddle x     PNF Chop/Lifts                          Aquatic Abbreviation Key  B= Belt DB= Dumbells T= Theratube   H= Hydrotone N= Noodles W= Weights   P= Paddles S= Speedo equipment K= Kickboard              Modalities: 12/30, 3/9  CP to left knee x 10 mins    Pt. Education:  - Gave pt tour of pool, explained how to use lockers, what to wear, that it would be in group setting, and showed pt aquatic equipment.  -patient educated on diagnosis, prognosis and expectations for rehab  -all patient questions were answered  4/6: Time spent assisting pt in setting up her fit bit     Home Exercise Program:      Access Code: [email or web address]  URL: [email or web address]  Date: 11/24/2021  Prepared by: Jim Holguin     Exercises  Supine Quad Set - 2 x daily - 7 x weekly - 1-2 sets - 10 reps - 5 secs hold  Supine Active Straight Leg Raise - 2 x daily - 7 x weekly - 2-3 sets - 10 reps  Supine Heel Slide - 2 x daily - 7 x weekly - 2-3 sets - 10 reps  Clamshell with Resistance - 2 x daily - 7 x weekly - 2-3 sets - 10 reps  Side Stepping with Resistance at Ankles - 2 x daily - 7 x weekly - 2-3 sets - 10 reps  Standing Hip Abduction with Resistance at Ankles and Counter Support - 2 x daily - 7 x weekly - 2-3 sets - 10 reps    Therapeutic Exercise and NMR EXR  [x] (08835) Provided verbal/tactile cueing for activities related to strengthening, flexibility, endurance, ROM for improvements in  [x] LE / Lumbar: LE, proximal hip, and core control with self care, mobility, lifting, ambulation. [] UE / Cervical: cervical, postural, scapular, scapulothoracic and UE control with self care, reaching, carrying, lifting, house/yardwork, driving, computer work.  [] (81192) Provided verbal/tactile cueing for activities related to improving balance, coordination, kinesthetic sense, posture, motor skill, proprioception to assist with   [] LE / lumbar: LE, proximal hip, and core control in self care, mobility, lifting, ambulation and eccentric single leg control. [] UE / cervical: cervical, scapular, scapulothoracic and UE control with self care, reaching, carrying, lifting, house/yardwork, driving, computer work.        [] (39594) Aquatic therapy with therapeutic exercise. Provided verbal and tactile cueing for activities related to strengthening, flexibility, endurance, ROM for improvements in  [] LE / lumbar: LE, proximal hip, and core control in self care, mobility, lifting, ambulation and eccentric single leg control. [] UE / cervical: cervical, scapular, scapulothoracic and UE control with self care, reaching, carrying, lifting, house/yardwork, driving, computer work.   [] (16104) Therapist is in constant attendance of 2 or more patients providing skilled therapy interventions, but not providing any significant amount of measurable one-on-one time to either patient, for improvements in  [] LE / lumbar: LE, proximal hip, and core control in self care, mobility, lifting, ambulation and eccentric single leg control. [] UE / cervical: cervical, scapular, scapulothoracic and UE control with self care, reaching, carrying, lifting, house/yardwork, driving, computer work. NMR and Therapeutic Activities:    [x] (29917 or 94991) Provided verbal/tactile cueing for activities related to improving balance, coordination, kinesthetic sense, posture, motor skill, proprioception and motor activation to allow for proper function of   [x] LE: / Lumbar core, proximal hip and LE with self care and ADLs  [] UE / Cervical: cervical, postural, scapular, scapulothoracic and UE control with self care, carrying, lifting, driving, computer work. [x] (35623) Gait Re-education- Provided training and instruction to the patient for proper LE, core and proximal hip recruitment and positioning and eccentric body weight control with ambulation re-education including up and down stairs     Home Management Training / Self Care:  [] (83198) Provided self-care/home management training related to activities of daily living and compensatory training, and/or use of adaptive equipment for improvement with: ADLs and compensatory training, meal preparation, safety procedures and instruction in use of adaptive equipment, including bathing, grooming, dressing, personal hygiene, basic household cleaning and chores.      Home Exercise Program:    [] (16122) Reviewed/Progressed HEP activities related to strengthening, flexibility, endurance, ROM of   [] LE / Lumbar: core, proximal hip and LE for functional self-care, mobility, lifting and ambulation/stair navigation   [] UE / Cervical: cervical, postural, scapular, scapulothoracic and UE control with self care, reaching, carrying, lifting, house/yardwork, driving, computer work  [] (20068)Reviewed/Progressed HEP activities related to improving balance, coordination, kinesthetic sense, posture, motor skill, proprioception of   [] LE: core, proximal hip and LE for self care, mobility, lifting, and ambulation/stair navigation    [] UE / Cervical: cervical, postural,  scapular, scapulothoracic and UE control with self care, reaching, carrying, lifting, house/yardwork, driving, computer work    Manual Treatments:  PROM / STM / Oscillations-Mobs:  G-I, II, III, IV (PA's, Inf., Post.)  [] (45705) Provided manual therapy to mobilize LE, proximal hip and/or LS spine soft tissue/joints for the purpose of modulating pain, promoting relaxation,  increasing ROM, reducing/eliminating soft tissue swelling/inflammation/restriction, improving soft tissue extensibility and allowing for proper ROM for normal function with   [] LE / lumbar: self care, mobility, lifting and ambulation. [] UE / Cervical: self care, reaching, carrying, lifting, house/yardwork, driving, computer work. Modalities:  [] (06240) Vasopneumatic compression: Utilized vasopneumatic compression to decrease edema / swelling for the purpose of improving mobility and quad tone / recruitment which will allow for increased overall function including but not limited to self-care, transfers, ambulation, and ascending / descending stairs.     [] (62984) Aquatic therapy with therapeutic exercise. Provided verbal and tactile cueing for activities related to strengthening, flexibility, endurance, ROM for improvements in  [] LE / lumbar: LE, proximal hip, and core control in self care, mobility, lifting, ambulation and eccentric single leg control. [] UE / cervical: cervical, scapular, scapulothoracic and UE control with self care, reaching, carrying, lifting, house/yardwork, driving, computer work.   [] (25351) Therapist is in constant attendance of 2 or more patients providing skilled therapy interventions, but not providing any significant amount of measurable one-on-one time to either patient, for improvements in  [] LE / lumbar: LE, proximal hip, and core control in self care, mobility, lifting, ambulation and eccentric single leg control.    [] UE / cervical: cervical, scapular, scapulothoracic and UE control with self care, reaching, carrying, lifting, house/yardwork, driving, computer work. Charges:  Timed Code Treatment Minutes: 40   Total Treatment Minutes: 40     [] EVAL - LOW (29286)   [] EVAL - MOD (65158)  [] EVAL - HIGH (24258)  [] RE-EVAL (07521)  [x] IW(49707) x 1       [] Ionto  [x] NMR (40722) x  2     [] Vaso  [] Manual (24690) x       [] Ultrasound  [] TA x       [] Mech Traction (00314)  [] Aquatic Therapy x 1    [] ES (un) (67552):   [] Home Management Training x  [] ES(attended) (61066)   [] Dry Needling 1-2 muscles (91463):  [] Dry Needling 3+ muscles (346416)  [] Group: 1     [] Other:     GOALS:     Patient stated goal: learn new exercises to maintain, help with pain relief, go up/down steps better, walk without feeling of instability and not use a cane at all.   []? Progressing: []? Met: []? Not Met: []? Adjusted     Therapist goals for Patient:   Short Term Goals: To be achieved in: 2 weeks  1. Independent in HEP and progression per patient tolerance, in order to prevent re-injury. []? Progressing: [x]? Met: []? Not Met: []? Adjusted  2. Patient will have a decrease in pain to <= 3/10 in left knee and right leg to  facilitate improvement in movement, function, and ADLs as indicated by Functional Deficits. [x]? Progressing: []? Met: []? Not Met: []? Adjusted     Long Term Goals: To be achieved in:  5 weeks or discharge   1. Disability index score of 20% or less for the LEFS to assist with reaching prior level of function. [x]? Progressing: []? Met: []? Not Met: []? Adjusted  2. Patient will demonstrate increased AROM to 120* left knee flexion to allow for proper joint functioning as indicated by patients Functional Deficits. [x]? Progressing: []? Met: []? Not Met: []? Adjusted  3.  Patient will demonstrate an increase in Strength to at least  4+/5 in bilateral LE as well as good proximal hip strength and control to allow for proper functional mobility as indicated by patients Functional Deficits. [x]? Progressing: []? Met: []? Not Met: []? Adjusted  4. Patient will return to functional activities including ascending/descending steps in a step-to pattern without increased symptoms or restriction. [x]? Progressing: []? Met: []? Not Met: []? Adjusted  5. Patient will demonstrate the ability to ambulate without evidence of antalgia and normal step/stride length without A.D. in order to resume her prior functional activities without increased symptoms or restriction  [x]? Progressing: []? Met: []? Not Met: []? Adjusted      Overall Progression Towards Functional goals/ Treatment Progress Update:  [x] Patient is progressing as expected towards functional goals listed. [] Progression is slowed due to complexities/Impairments listed. [] Progression has been slowed due to co-morbidities. [] Plan just implemented, too soon to assess goals progression <30days   [] Goals require adjustment due to lack of progress  [] Patient is not progressing as expected and requires additional follow up with physician  [] Other    Persisting Functional Limitations/Impairments:  []Sleeping []Sitting               [x]Standing [x]Transfers        [x]Walking [x]Kneeling               [x]Stairs [x]Squatting / bending   [x]ADLs [x]Reaching  []Lifting  []Housework  []Driving []Job related tasks  []Sports/Recreation []Other:        ASSESSMENT: Patient with a good session this date. Added in multiple different techniques on the Biodex and patient tolerated well. She will be ready for discharge at the end of her remaining visits.        Treatment/Activity Tolerance:  [x] Patient able to complete tx  [] Patient limited by fatigue  [x] Patient limited by pain  [] Patient limited by other medical complications  [] Other:     Prognosis: [x] Good [x] Fair  [] Poor    Patient Requires Follow-up: [x] Yes  [] No    Plan for next treatment session:  1 aquatic and 1 land session per week to work on left knee as well as exacerbation of right leg/low back deficits that will impact tolerance to left knee and strengthening. PLAN: See eval. PT 2x/week x 5 weeks to include both land and aquatic therapy    [x] Continue per plan of care [] Alter current plan (see comments)  [] Plan of care initiated [] Hold pending MD visit [] Discharge    Electronically signed by: Ingrid Huffman, PT, DPT      Note: If patient does not return for scheduled/ recommended follow up visits, this note will serve as a discharge from care along with most recent update on progress.

## 2022-04-11 ENCOUNTER — HOSPITAL ENCOUNTER (OUTPATIENT)
Dept: PHYSICAL THERAPY | Age: 72
Setting detail: THERAPIES SERIES
Discharge: HOME | End: 2022-04-11
Payer: MEDICARE

## 2022-04-11 NOTE — FLOWSHEET NOTE
[address] [facility]    Physical Therapy  Cancellation/No-show Note  Patient Name:  Madeline Andrade  :  1950   Date:  2022    Cancelled visits to date: 4  No-shows to date: 2    For today's appointment patient:  [x]  Cancelled , , 3/14,   []  Rescheduled appointment  []  No-show , 3/2    Reason given by patient:  [x]  Patient ill - food poisoning   []  Conflicting appointment  []  No transportation    []  Conflict with work  []  No reason given  []  Other:     Comments: sister is having surgery         Phone call information:   []  Phone call made today to patient at 11:16 am at number provided: Mobile phone    []  Patient answered, conversation as follows: Said she contacted our office yesterday morning to cancel today's appt, said that Dr. Mahnaz Schuler told her to take a week off from PT after the steroid injection and that she would start back up next week as long as she's feeling better. []  Patient did not answer, message left as follows:  []  Phone call not made today  [x]  Phone call not needed - pt contacted us to cancel and provided reason for cancellation.      Electronically signed by:  Curry Polo, PT, DPT

## 2022-04-15 ENCOUNTER — HOSPITAL ENCOUNTER (OUTPATIENT)
Dept: PHYSICAL THERAPY | Age: 72
Setting detail: THERAPIES SERIES
Discharge: HOME | End: 2022-04-15
Payer: MEDICARE

## 2022-04-15 PROCEDURE — 97113 AQUATIC THERAPY/EXERCISES: CPT

## 2022-04-15 PROCEDURE — 97150 GROUP THERAPEUTIC PROCEDURES: CPT

## 2022-04-15 NOTE — FLOWSHEET NOTE
[facility]  Phone: [phone]   Fax: [phone]          Physical Therapy Daily Treatment Note  Date:  4/15/2022    Patient Name:  Regina Lovell    :  1950  MRN: [id number]  Medical/Treatment Diagnosis Information:  · Diagnosis: Chronic pain of left knee M25.562, G89.29  · Treatment Diagnosis: Decreased standing/ambulation tolerance, difficulty with stairs, squatting, decreased functional activity tolerance  Insurance/Certification information:  PT Insurance Information: Medicare  Physician Information:  Referring Practitioner: Mark Kahn  Plan of care signed (Y/N): []  Yes [x]  No     Date of Patient follow up with Physician:      Progress Report: []  Yes  [x]  No     Date Range for reporting period:   Beginnin/16   POC: 3/16/22  Ending:      Progress report due (10 Rx/or 30 days whichever is less): visit #  or      Recertification due (POC duration/ or 90 days whichever is less): visit #    Visit # Insurance Allowable Auth required? Date Range   10/103/6   [place] []  Yes  []  No        Latex Allergy:  [x]NO      []YES  Preferred Language for Healthcare:   [x]English       []other:    Functional Scale:        Date assessed:  LEFS: raw score = 33/80; dysfunction = %  21  LEFS: 23; 60-79%      22  LEFS: raw score = 36; dysfunction 55%   3/16/22    Pain level:  4/10 left knee;      SUBJECTIVE: Patient reports she has been sick (- for COVID) but has still been doing her stretches. She tries to get 3-5k steps per day. She is feeling good today, just feels very tight. OBJECTIVE:     3/9: pt ambulates to NuStep without cane and slow gait speed.   3/16: See POC for       RESTRICTIONS/PRECAUTIONS: SI injection on     Exercises/Interventions:     Therapeutic Exercises (68417) Resistance / level Sets/sec Reps Notes   Nustep  Bike   1.0   X 5 min    3/23          IB, HR/TR  30\"  X 2   2 x 10     HS Stretch  30\" B     Prone Knee Extension --> Standing TKE [place] TB 2/24: pt enjoys this, felt a stretch on LLE. 3/9 added hold   Step Ups Fwd 6\" 1 X 10 B 4/6: Added SLS hold    Step Ups Lat 6\" 2/24: cont npv   LAQ 2/24   SLR Flex    Clamshells 2/24   Quad Set 2/24   Glute Bridges  3/23   T.G. Squats  3/9, 3/23: pt with increased fatigue today   Leg Press       PN completed - see above for ROM, MMT, LEFS scores 3/16             Therapeutic Activities (87157)       TrA: seated hip ADD ball squeeze w/ GH DB flex 2/24   Sit<>stands 2/24: from mat table, cues for upright posture once standing   Lateral Band Walk  Monster Walk Fwd/Bwd [place] TB  [place] 1  3/23, 3/30 cues for upright posture   Lateral Step downs 3/30: tactile and verbal cues to avoid knee valgus          Neuromuscular Re-ed (03842)              3-way hip, band     Hip abd is part of HEP           Tandem balance Airex     SLS     Airex Hip Abduction       Biodex Training:  - Motor control  - Random control  - Maze control   L5, Full pattern  L5  L5     2 min   62%  100%  100%     [place] IRA speed, low level  Low level    Biodex Games:   - Maze  - Recovery rapids    L5  Easy   32 sec  4 min      Distance: 426 m Bottles: 18 Score: 866                  Manual Intervention (50584)                                                   AquaticTherapy Dates of Service: 2/18, 2/21, 3/7, 3/21, 3/28, 4/15  Aquatic Visits Exercises/Activities:   Transfers:  [x] Stairs   [] Ramp  [] Chair Lift   % Immersion:            Ambulation/ Warm up:   UE Exercises:       Forward  x 2 Shoulder Shrugs      Lateral   x 2 Shoulder Circles      Retro  X 2 Scapular Retraction      [place]    Push Downs      Heel/Toe Walking    Punching       Rowing    HS stretch walking X 1   Elbow Flex/Ext       Shldr Flex/Ext       Shldr aBd/aDd    LE Exercises:  Shldr Horiz aBd/aDd    HR/TR x20 Shldr IR/ER    Marches x20 Arm Circles    Squats X20 PNF Diagonals    Hamstring Curls x20 Wall Push Ups    Hip Flexion (SLR) x20 Hip aBduction (SLR) x20     Hip Extension (SLR) x20      Hip aDduction (SLR)      Hip Circles x Functional:    Hip IR/Er  Step up forward x20 B   Hip Hikes x Step up lateral  X 20 B     Step down        Lunges Forward      Lunges Retro      Lunges Lateral     Balance:        SLS  2x15\" B      Tandem Stance 2 X 20\"      NBOS eyes open Seated:     NBOS eyes closed Ankle pumps     Hand to Opposite Knee x Ankle Circles     Fwd Step ups to SLS  Knee Flex/Ext    Lateral Step ups to SLS  Hip aBd/aDd    Stop/Go Gait   Bicycle       Ankle DF/PF      Ankle Inv/Ev    Stretching:       Gastroc/Soleus - lunge 2x30\"     Hamstring - (seated) 2x30\" [place]:    Knee Flex Stretch  Jog    Piriformis  x Noodle Hang x   Hip Flexor  Traction at Wall    SKTC x     DKTC  x     ITB x Cool Down:    Quad 2 x 20\" B Fwd Walking x   Mid Back   Lat Walking x   UT  Retro Walking    Post Shoulder  Noodle float x   Ladder Pull      Pec Stretch            Core: Other:    TrA set x     Pelvic Tilts x     Multifidi Walk outs c paddle x     PNF Chop/Lifts                          Aquatic Abbreviation Key  B= Belt DB= Dumbells T= Theratube   H= Hydrotone N= Noodles W= Weights   P= Paddles S= Speedo equipment K= Kickboard              Modalities: 12/30, 3/9  CP to left knee x 10 mins    Pt. Education:  - Gave pt tour of pool, explained how to use lockers, what to wear, that it would be in group setting, and showed pt aquatic equipment.  -patient educated on diagnosis, prognosis and expectations for rehab  -all patient questions were answered  4/6: Time spent assisting pt in setting up her fit bit     Home Exercise Program:      Access Code: [email or web address]  URL: [email or web address]  Date: 11/24/2021  Prepared by: Didier Fink     Exercises  Supine Quad Set - 2 x daily - 7 x weekly - 1-2 sets - 10 reps - 5 secs hold  Supine Active Straight Leg Raise - 2 x daily - 7 x weekly - 2-3 sets - 10 reps  Supine Heel Slide - 2 x daily - 7 x weekly - 2-3 sets - 10 reps  Clamshell with Resistance - 2 x daily - 7 x weekly - 2-3 sets - 10 reps  Side Stepping with Resistance at Ankles - 2 x daily - 7 x weekly - 2-3 sets - 10 reps  Standing Hip Abduction with Resistance at Ankles and Counter Support - 2 x daily - 7 x weekly - 2-3 sets - 10 reps    Therapeutic Exercise and NMR EXR  [] (95496) Provided verbal/tactile cueing for activities related to strengthening, flexibility, endurance, ROM for improvements in  [] LE / Lumbar: LE, proximal hip, and core control with self care, mobility, lifting, ambulation. [] UE / Cervical: cervical, postural, scapular, scapulothoracic and UE control with self care, reaching, carrying, lifting, house/yardwork, driving, computer work.  [] (44552) Provided verbal/tactile cueing for activities related to improving balance, coordination, kinesthetic sense, posture, motor skill, proprioception to assist with   [] LE / lumbar: LE, proximal hip, and core control in self care, mobility, lifting, ambulation and eccentric single leg control. [] UE / cervical: cervical, scapular, scapulothoracic and UE control with self care, reaching, carrying, lifting, house/yardwork, driving, computer work. [x] (74905) Aquatic therapy with therapeutic exercise. Provided verbal and tactile cueing for activities related to strengthening, flexibility, endurance, ROM for improvements in  [x] LE / lumbar: LE, proximal hip, and core control in self care, mobility, lifting, ambulation and eccentric single leg control. [x] UE / cervical: cervical, scapular, scapulothoracic and UE control with self care, reaching, carrying, lifting, house/yardwork, driving, computer work.    [x] (23394) Therapist is in constant attendance of 2 or more patients providing skilled therapy interventions, but not providing any significant amount of measurable one-on-one time to either patient, for improvements in  [x] LE / lumbar: LE, proximal hip, and core control in self care, mobility, lifting, ambulation and eccentric single leg control. [x] UE / cervical: cervical, scapular, scapulothoracic and UE control with self care, reaching, carrying, lifting, house/yardwork, driving, computer work. NMR and Therapeutic Activities:    [] (25700 or 26201) Provided verbal/tactile cueing for activities related to improving balance, coordination, kinesthetic sense, posture, motor skill, proprioception and motor activation to allow for proper function of   [] LE: / Lumbar core, proximal hip and LE with self care and ADLs  [] UE / Cervical: cervical, postural, scapular, scapulothoracic and UE control with self care, carrying, lifting, driving, computer work.   [] (15919) Gait Re-education- Provided training and instruction to the patient for proper LE, core and proximal hip recruitment and positioning and eccentric body weight control with ambulation re-education including up and down stairs     Home Management Training / Self Care:  [] (18179) Provided self-care/home management training related to activities of daily living and compensatory training, and/or use of adaptive equipment for improvement with: ADLs and compensatory training, meal preparation, safety procedures and instruction in use of adaptive equipment, including bathing, grooming, dressing, personal hygiene, basic household cleaning and chores.      Home Exercise Program:    [] (95408) Reviewed/Progressed HEP activities related to strengthening, flexibility, endurance, ROM of   [] LE / Lumbar: core, proximal hip and LE for functional self-care, mobility, lifting and ambulation/stair navigation   [] UE / Cervical: cervical, postural, scapular, scapulothoracic and UE control with self care, reaching, carrying, lifting, house/yardwork, driving, computer work  [] (55059)Reviewed/Progressed HEP activities related to improving balance, coordination, kinesthetic sense, posture, motor skill, proprioception of   [] LE: core, proximal hip and LE for self care, mobility, lifting, and ambulation/stair navigation    [] UE / Cervical: cervical, postural,  scapular, scapulothoracic and UE control with self care, reaching, carrying, lifting, house/yardwork, driving, computer work    Manual Treatments:  PROM / STM / Oscillations-Mobs:  G-I, II, III, IV (PA's, Inf., Post.)  [] (66035) Provided manual therapy to mobilize LE, proximal hip and/or LS spine soft tissue/joints for the purpose of modulating pain, promoting relaxation,  increasing ROM, reducing/eliminating soft tissue swelling/inflammation/restriction, improving soft tissue extensibility and allowing for proper ROM for normal function with   [] LE / lumbar: self care, mobility, lifting and ambulation. [] UE / Cervical: self care, reaching, carrying, lifting, house/yardwork, driving, computer work. Modalities:  [] (35510) Vasopneumatic compression: Utilized vasopneumatic compression to decrease edema / swelling for the purpose of improving mobility and quad tone / recruitment which will allow for increased overall function including but not limited to self-care, transfers, ambulation, and ascending / descending stairs.     [] (28663) Aquatic therapy with therapeutic exercise. Provided verbal and tactile cueing for activities related to strengthening, flexibility, endurance, ROM for improvements in  [] LE / lumbar: LE, proximal hip, and core control in self care, mobility, lifting, ambulation and eccentric single leg control.    [] UE / cervical: cervical, scapular, scapulothoracic and UE control with self care, reaching, carrying, lifting, house/yardwork, driving, computer work.   [] (22700) Therapist is in constant attendance of 2 or more patients providing skilled therapy interventions, but not providing any significant amount of measurable one-on-one time to either patient, for improvements in  [] LE / lumbar: LE, proximal hip, and core control in self care, mobility, lifting, ambulation and eccentric single leg control. [] UE / cervical: cervical, scapular, scapulothoracic and UE control with self care, reaching, carrying, lifting, house/yardwork, driving, computer work. Charges:  Timed Code Treatment Minutes: 18   Total Treatment Minutes: 42     [] EVAL - LOW (16375)   [] EVAL - MOD (93506)  [] EVAL - HIGH (63847)  [] RE-EVAL (88863)  [] XK(66722) x 1       [] Ionto  [] NMR (46460) x  2     [] Vaso  [] Manual (86788) x       [] Ultrasound  [] TA x       [] Mech Traction (01737)  [x] Aquatic Therapy x 1    [] ES (un) (87689):   [] Home Management Training x  [] ES(attended) (52223)   [] Dry Needling 1-2 muscles (79008):  [] Dry Needling 3+ muscles (046726)  [x] Group: 1     [] Other:     GOALS:     Patient stated goal: learn new exercises to maintain, help with pain relief, go up/down steps better, walk without feeling of instability and not use a cane at all.   []? Progressing: []? Met: []? Not Met: []? Adjusted     Therapist goals for Patient:   Short Term Goals: To be achieved in: 2 weeks  1. Independent in HEP and progression per patient tolerance, in order to prevent re-injury. []? Progressing: [x]? Met: []? Not Met: []? Adjusted  2. Patient will have a decrease in pain to <= 3/10 in left knee and right leg to  facilitate improvement in movement, function, and ADLs as indicated by Functional Deficits. [x]? Progressing: []? Met: []? Not Met: []? Adjusted     Long Term Goals: To be achieved in:  5 weeks or discharge   1. Disability index score of 20% or less for the LEFS to assist with reaching prior level of function. [x]? Progressing: []? Met: []? Not Met: []? Adjusted  2. Patient will demonstrate increased AROM to 120* left knee flexion to allow for proper joint functioning as indicated by patients Functional Deficits. [x]? Progressing: []? Met: []? Not Met: []? Adjusted  3.  Patient will demonstrate an increase in Strength to at least  4+/5 in bilateral LE as well as good proximal hip strength and control to allow for proper functional mobility as indicated by patients Functional Deficits. [x]? Progressing: []? Met: []? Not Met: []? Adjusted  4. Patient will return to functional activities including ascending/descending steps in a step-to pattern without increased symptoms or restriction. [x]? Progressing: []? Met: []? Not Met: []? Adjusted  5. Patient will demonstrate the ability to ambulate without evidence of antalgia and normal step/stride length without A.D. in order to resume her prior functional activities without increased symptoms or restriction  [x]? Progressing: []? Met: []? Not Met: []? Adjusted      Overall Progression Towards Functional goals/ Treatment Progress Update:  [x] Patient is progressing as expected towards functional goals listed. [] Progression is slowed due to complexities/Impairments listed. [] Progression has been slowed due to co-morbidities. [] Plan just implemented, too soon to assess goals progression <30days   [] Goals require adjustment due to lack of progress  [] Patient is not progressing as expected and requires additional follow up with physician  [] Other    Persisting Functional Limitations/Impairments:  []Sleeping []Sitting               [x]Standing [x]Transfers        [x]Walking [x]Kneeling               [x]Stairs [x]Squatting / bending   [x]ADLs [x]Reaching  []Lifting  []Housework  []Driving []Job related tasks  []Sports/Recreation []Other:        ASSESSMENT: Added quick HS stretch while walking and standing quad as pt reported feeling like her L knee was very tight this date. Pt progressing towards independence with her routine. Despite missing her last visit she was able to resume her normal routine. Will continue to progress as able to reach max level of function.        Treatment/Activity Tolerance:  [x] Patient able to complete tx  [] Patient limited by fatigue  [x] Patient limited by pain  [] Patient limited by other medical complications  [] Other:     Prognosis: [x] Good [x] Fair  [] Poor    Patient Requires Follow-up: [x] Yes  [] No    Plan for next treatment session:  1 aquatic and 1 land session per week to work on left knee as well as exacerbation of right leg/low back deficits that will impact tolerance to left knee and strengthening. PLAN: See eval. PT 2x/week x 5 weeks to include both land and aquatic therapy    [x] Continue per plan of care [] Alter current plan (see comments)  [] Plan of care initiated [] Hold pending MD visit [] Discharge    Electronically signed by: Nirmala Epperson, PT, DPT      Note: If patient does not return for scheduled/ recommended follow up visits, this note will serve as a discharge from care along with most recent update on progress.

## 2022-04-18 ENCOUNTER — HOSPITAL ENCOUNTER (OUTPATIENT)
Dept: PHYSICAL THERAPY | Age: 72
Setting detail: THERAPIES SERIES
Discharge: HOME | End: 2022-04-18
Payer: MEDICARE

## 2022-04-18 PROCEDURE — 97110 THERAPEUTIC EXERCISES: CPT

## 2022-04-18 PROCEDURE — 97530 THERAPEUTIC ACTIVITIES: CPT

## 2022-04-18 NOTE — PROGRESS NOTES
[facility]  Phone: [phone]   Fax: [phone]     Physical Therapy Discharge Summary    Dear Eun Bailey,    We had the pleasure of treating the following patient for physical therapy services at [facility]. A summary of our findings can be found in the discharge summary below. If you have any questions or concerns regarding these findings, please do not hesitate to contact me at the office phone number checked above. Thank you for the referral.     Physician Signature:________________________________Date:__________________  By signing above (or electronic signature), therapists plan is approved by physician      Functional Outcome:    Bold denotes improvement      AROM     L R   Knee Flexion 128 132   Knee Extension 0 0      Strength (0-5) / Myotomes Left Right   Hip Flexion - seated (L1-2) 4+ 5   Hip Abduction 4+ 3+   Hip Extension  4- 4-   Hip ER 5 5   Hip IR 5 4+   Quads (L2-4) 5 5   Hamstrings 5 5   Ankle Dorsiflexion (L4-5) 5 5      Hamstring flexibility (90/90 position): R lacking 7 deg; L lacking 5 deg     Overall Response to Treatment:   [x]Patient is responding well to treatment and improvement is noted with regards  to goals   []Patient should continue to improve in reasonable time if they continue HEP   []Patient has plateaued and is no longer responding to skilled PT intervention    []Patient is getting worse and would benefit from return to referring MD   []Patient unable to adhere to initial POC   []Other:     Date range of Visits:  10/8/21 to 22  Total Visits:  18    Recommendation:    [x] Discharge to [facility]. Follow up with PT or MD PRN.                Physical Therapy Daily Treatment Note  Date:  2022    Patient Name:  Adele Jimenez    :  1950  MRN: [id number]  Medical/Treatment Diagnosis Information:  · Diagnosis: Chronic pain of left knee M25.562, G89.29  · Treatment Diagnosis: Decreased standing/ambulation tolerance, difficulty with stairs, squatting, decreased functional activity tolerance  Insurance/Certification information:  PT Insurance Information: Medicare  Physician Information:  Referring Practitioner: Jose Ruiz  Plan of care signed (Y/N): []  Yes [x]  No     Date of Patient follow up with Physician:      Progress Report: []  Yes  [x]  No     Date Range for reporting period:   Beginnin/16   POC: 22  Ending:      Progress report due (10 Rx/or 30 days whichever is less): visit #  or      Recertification due (POC duration/ or 90 days whichever is less): visit #    Visit # Insurance Allowable Auth required? Date Range   10/104/6   [place] []  Yes  []  No        Latex Allergy:  [x]NO      []YES  Preferred Language for Healthcare:   [x]English       []other:    Functional Scale:        Date assessed:  LEFS: raw score = 33/80; dysfunction = %  21  LEFS: 23; 60-79%      22  LEFS: raw score = 36; dysfunction 55%   3/16/22  LEFS: raw score = 51; 20-39%         Pain level:  4/10 left knee;      SUBJECTIVE: Consistently getting a lot more steps in, and doing all the exercises, seems like it's working. Tries not to think about the nerve pain, but the knee still gets tight. Occasionally takes some Ibuprofen. Feel stronger. Can go down steps much better, no longer have to go down sideways. Feels 80% of normal. No longer needs an A.D. to walk. OBJECTIVE:     3/9: pt ambulates to NuStep without cane and slow gait speed.   3/16: See POC for       RESTRICTIONS/PRECAUTIONS: SI injection on     Exercises/Interventions:     Therapeutic Exercises (20312) Resistance / level Sets/sec Reps Notes   Nustep  Bike   1.0   X 5 min    3/23          IB, HR/TR  30\"  X 2   2 x 10     HS Stretch  30\" B     Prone Knee Extension --> Standing TKE Howard TB : pt enjoys this, felt a stretch on LLE. 3/9 added hold   Step Ups Fwd 6\" 1 X 10 B : Added SLS hold    Step Ups Lat 6\" 2/24: cont npv   LAQ 2/24   SLR Flex    Clamshells 2/24   Quad Set 2/24   Glute Bridges  3/23   T.G. Squats  3/9, 3/23: pt with increased fatigue today   Leg Press       PN completed - see above for ROM, MMT, LEFS scores 4/18             Therapeutic Activities (52625)       TrA: seated hip ADD ball squeeze w/ GH DB flex 2/24   Sit<>stands 2/24: from mat table, cues for upright posture once standing   Lateral Band Walk  Monster Walk Fwd/Bwd Poinsett TB  Po[place]ett 1  3/23, 3/30 cues for upright posture   Lateral Step downs 3/30: tactile and verbal cues to avoid knee valgus          Neuromuscular Re-ed (84580)              3-way hip, band     Hip abd is part of HEP           Tandem balance Airex     SLS     Airex Hip Abduction           Low Eek speed, low level  Low level        Distance: 426 m Bottles: 18 Score: 866                  Manual Intervention (40064)                                                   AquaticTherapy Dates of Service: 2/18, 2/21, 3/7, 3/21, 3/28, 4/15  Aquatic Visits Exercises/Activities:   Transfers:  [x] Stairs   [] Ramp  [] Chair Lift   % Immersion:            Ambulation/ Warm up:   UE Exercises:       Forward  x 2 Shoulder Shrugs      Lateral   x 2 Shoulder Circles      Retro  X 2 Scapular Retraction      [place]ariocas    Push Downs      Heel/Toe Walking    Punching       Rowing    HS stretch walking X 1   Elbow Flex/Ext       Shldr Flex/Ext       Shldr aBd/aDd    LE Exercises:  Shldr Horiz aBd/aDd    HR/TR x20 Shldr IR/ER    Marches x20 Arm Circles    Squats X20 PNF Diagonals    Hamstring Curls x20 Wall Push Ups    Hip Flexion (SLR) x20     Hip aBduction (SLR) x20     Hip Extension (SLR) x20      Hip aDduction (SLR)      Hip Circles x Functional:    Hip IR/Er  Step up forward x20 B   Hip Hikes x Step up lateral  X 20 B     Step down        Lunges Forward      Lunges Retro      Lunges Lateral     Balance:        SLS  2x15\" B      Tandem Stance 2 X 20\"      NBOS eyes open Seated:     NBOS eyes closed Ankle pumps     Hand to Opposite Knee x Ankle Circles     Fwd Step ups to SLS  Knee Flex/Ext    Lateral Step ups to SLS  Hip aBd/aDd    Stop/Go Gait   Bicycle       Ankle DF/PF      Ankle Inv/Ev    Stretching:       Gastroc/Soleus - lunge 2x30\"     Hamstring - (seated) 2x30\" Deep Water:    Knee Flex Stretch  Jog    Piriformis  x Noodle Hang x   Hip Flexor  Traction at Wall    SKTC x     DKTC  x     ITB x Cool Down:    Quad 2 x 20\" B Fwd Walking x   Mid Back   Lat Walking x   UT  Retro Walking    Post Shoulder  Noodle float x   Ladder Pull      Pec Stretch            Core: Other:    TrA set x     Pelvic Tilts x     Multifidi Walk outs c paddle x     PNF Chop/Lifts                          Aquatic Abbreviation Key  B= Belt DB= Dumbells T= Theratube   H= Hydrotone N= Noodles W= Weights   P= Paddles S= Speedo equipment K= Kickboard              Modalities: 12/30, 3/9  CP to left knee x 10 mins    Pt. Education:  - Gave pt tour of pool, explained how to use lockers, what to wear, that it would be in group setting, and showed pt aquatic equipment.  -patient educated on diagnosis, prognosis and expectations for rehab  -all patient questions were answered  4/6: Time spent assisting pt in setting up her fit bit     Home Exercise Program:      Access Code: [email or web address]  URL: [email or web address]  Date: 11/24/2021  Prepared by: Mari Marley     Exercises  Supine Quad Set - 2 x daily - 7 x weekly - 1-2 sets - 10 reps - 5 secs hold  Supine Active Straight Leg Raise - 2 x daily - 7 x weekly - 2-3 sets - 10 reps  Supine Heel Slide - 2 x daily - 7 x weekly - 2-3 sets - 10 reps  Clamshell with Resistance - 2 x daily - 7 x weekly - 2-3 sets - 10 reps  Side Stepping with Resistance at Ankles - 2 x daily - 7 x weekly - 2-3 sets - 10 reps  Standing Hip Abduction with Resistance at Ankles and Counter Support - 2 x daily - 7 x weekly - 2-3 sets - 10 reps    Therapeutic Exercise and NMR EXR  [] (30818) Provided verbal/tactile cueing for activities related to strengthening, flexibility, endurance, ROM for improvements in  [] LE / Lumbar: LE, proximal hip, and core control with self care, mobility, lifting, ambulation. [] UE / Cervical: cervical, postural, scapular, scapulothoracic and UE control with self care, reaching, carrying, lifting, house/yardwork, driving, computer work.  [] (60127) Provided verbal/tactile cueing for activities related to improving balance, coordination, kinesthetic sense, posture, motor skill, proprioception to assist with   [] LE / lumbar: LE, proximal hip, and core control in self care, mobility, lifting, ambulation and eccentric single leg control. [] UE / cervical: cervical, scapular, scapulothoracic and UE control with self care, reaching, carrying, lifting, house/yardwork, driving, computer work. [x] (19106) Aquatic therapy with therapeutic exercise. Provided verbal and tactile cueing for activities related to strengthening, flexibility, endurance, ROM for improvements in  [x] LE / lumbar: LE, proximal hip, and core control in self care, mobility, lifting, ambulation and eccentric single leg control. [x] UE / cervical: cervical, scapular, scapulothoracic and UE control with self care, reaching, carrying, lifting, house/yardwork, driving, computer work. [x] (32018) Therapist is in constant attendance of 2 or more patients providing skilled therapy interventions, but not providing any significant amount of measurable one-on-one time to either patient, for improvements in  [x] LE / lumbar: LE, proximal hip, and core control in self care, mobility, lifting, ambulation and eccentric single leg control. [x] UE / cervical: cervical, scapular, scapulothoracic and UE control with self care, reaching, carrying, lifting, house/yardwork, driving, computer work.        NMR and Therapeutic Activities:    [] (84990 or 06307) Provided verbal/tactile cueing for activities related to improving balance, coordination, kinesthetic sense, posture, motor skill, proprioception and motor activation to allow for proper function of   [] LE: / Lumbar core, proximal hip and LE with self care and ADLs  [] UE / Cervical: cervical, postural, scapular, scapulothoracic and UE control with self care, carrying, lifting, driving, computer work.   [] (18103) Gait Re-education- Provided training and instruction to the patient for proper LE, core and proximal hip recruitment and positioning and eccentric body weight control with ambulation re-education including up and down stairs     Home Management Training / Self Care:  [] (76247) Provided self-care/home management training related to activities of daily living and compensatory training, and/or use of adaptive equipment for improvement with: ADLs and compensatory training, meal preparation, safety procedures and instruction in use of adaptive equipment, including bathing, grooming, dressing, personal hygiene, basic household cleaning and chores.      Home Exercise Program:    [] (94578) Reviewed/Progressed HEP activities related to strengthening, flexibility, endurance, ROM of   [] LE / Lumbar: core, proximal hip and LE for functional self-care, mobility, lifting and ambulation/stair navigation   [] UE / Cervical: cervical, postural, scapular, scapulothoracic and UE control with self care, reaching, carrying, lifting, house/yardwork, driving, computer work  [] (47176)Reviewed/Progressed HEP activities related to improving balance, coordination, kinesthetic sense, posture, motor skill, proprioception of   [] LE: core, proximal hip and LE for self care, mobility, lifting, and ambulation/stair navigation    [] UE / Cervical: cervical, postural,  scapular, scapulothoracic and UE control with self care, reaching, carrying, lifting, house/yardwork, driving, computer work    Manual Treatments:  PROM / STM / Oscillations-Mobs:  G-I, II, III, IV (PA's, Inf., Post.)  [] (35191) Provided manual therapy to mobilize LE, proximal hip and/or LS spine soft tissue/joints for the purpose of modulating pain, promoting relaxation,  increasing ROM, reducing/eliminating soft tissue swelling/inflammation/restriction, improving soft tissue extensibility and allowing for proper ROM for normal function with   [] LE / lumbar: self care, mobility, lifting and ambulation. [] UE / Cervical: self care, reaching, carrying, lifting, house/yardwork, driving, computer work. Modalities:  [] (43435) Vasopneumatic compression: Utilized vasopneumatic compression to decrease edema / swelling for the purpose of improving mobility and quad tone / recruitment which will allow for increased overall function including but not limited to self-care, transfers, ambulation, and ascending / descending stairs.     [] (76857) Aquatic therapy with therapeutic exercise. Provided verbal and tactile cueing for activities related to strengthening, flexibility, endurance, ROM for improvements in  [] LE / lumbar: LE, proximal hip, and core control in self care, mobility, lifting, ambulation and eccentric single leg control. [] UE / cervical: cervical, scapular, scapulothoracic and UE control with self care, reaching, carrying, lifting, house/yardwork, driving, computer work.   [] (85872) Therapist is in constant attendance of 2 or more patients providing skilled therapy interventions, but not providing any significant amount of measurable one-on-one time to either patient, for improvements in  [] LE / lumbar: LE, proximal hip, and core control in self care, mobility, lifting, ambulation and eccentric single leg control. [] UE / cervical: cervical, scapular, scapulothoracic and UE control with self care, reaching, carrying, lifting, house/yardwork, driving, computer work.      Charges:  Timed Code Treatment Minutes: 40   Total Treatment Minutes: 40     [] EVAL - LOW (75879)   [] EVAL - MOD (41032)  [] EVAL - HIGH (88806)  [] RE-EVAL (95536)  [x] YL(45741) x 1       [] Ionto  [] NMR (83058) x  2     [] Vaso  [] Manual (64701) x       [] Ultrasound  [x] TA x 2       [] Mech Traction (44843)  [] Aquatic Therapy x 1    [] ES (un) (05767):   [] Home Management Training x  [] ES(attended) (57675)   [] Dry Needling 1-2 muscles (54827):  [] Dry Needling 3+ muscles (029787)  [] Group: 1     [] Other:     GOALS:     Patient stated goal: learn new exercises to maintain, help with pain relief, go up/down steps better, walk without feeling of instability and not use a cane at all.   []? Progressing: []? Met: []? Not Met: []? Adjusted     Therapist goals for Patient:   Short Term Goals: To be achieved in: 2 weeks  1. Independent in HEP and progression per patient tolerance, in order to prevent re-injury. []? Progressing: [x]? Met: []? Not Met: []? Adjusted  2. Patient will have a decrease in pain to <= 3/10 in left knee and right leg to  facilitate improvement in movement, function, and ADLs as indicated by Functional Deficits. - Pain 3-4/10   [x]? Progressing: []? Met: []? Not Met: []? Adjusted     Long Term Goals: To be achieved in:  5 weeks or discharge   1. Disability index score of 20% or less for the LEFS to assist with reaching prior level of function. - Achieved 37%   [x]? Progressing: []? Met: []? Not Met: []? Adjusted  2. Patient will demonstrate increased AROM to 120* left knee flexion to allow for proper joint functioning as indicated by patients Functional Deficits. [x]? Progressing: []? Met: []? Not Met: []? Adjusted  3. Patient will demonstrate an increase in Strength to at least  4+/5 in bilateral LE as well as good proximal hip strength and control to allow for proper functional mobility as indicated by patients Functional Deficits. - Met except hip extension and abd   [x]? Progressing: []? Met: []? Not Met: []? Adjusted  4.  Patient will return to functional activities including ascending/descending steps in a step-to pattern without increased symptoms or restriction. []? Progressing: [x]? Met: []? Not Met: []? Adjusted  5. Patient will demonstrate the ability to ambulate without evidence of antalgia and normal step/stride length without A.D. in order to resume her prior functional activities without increased symptoms or restriction  []? Progressing: [x]? Met: []? Not Met: []? Adjusted      Overall Progression Towards Functional goals/ Treatment Progress Update:  [x] Patient is progressing as expected towards functional goals listed. [] Progression is slowed due to complexities/Impairments listed. [] Progression has been slowed due to co-morbidities. [] Plan just implemented, too soon to assess goals progression <30days   [] Goals require adjustment due to lack of progress  [] Patient is not progressing as expected and requires additional follow up with physician  [] Other    Persisting Functional Limitations/Impairments:  []Sleeping []Sitting               [x]Standing [x]Transfers        [x]Walking [x]Kneeling               [x]Stairs [x]Squatting / bending   [x]ADLs [x]Reaching  []Lifting  []Housework  []Driving []Job related tasks  []Sports/Recreation []Other:        ASSESSMENT: Patient has received 18 skilled PT visits, and has shown good overall response towards goals and function. She is pleased with her current activity tolerance level, and is now able to ambulate without an A.D., and ascend/descend steps reciprocally. Answered all pt's questions to her satisfaction, and she is IND with her HEP. Issued a 30 day [facility] pass to continue to work on aquatic therapy interventions. Pt is satisfied with her current plan, and will be discharged at this time.        Treatment/Activity Tolerance:  [x] Patient able to complete tx  [] Patient limited by fatigue  [x] Patient limited by pain  [] Patient limited by other medical complications  [] Other:     Prognosis: [x] Good [x] Fair  [] Poor    Patient Requires Follow-up: [x] Yes  [] No    Plan for next treatment session:  1 aquatic and 1 land session per week to work on left knee as well as exacerbation of right leg/low back deficits that will impact tolerance to left knee and strengthening. PLAN: See eval. PT 2x/week x 5 weeks to include both land and aquatic therapy    [] Continue per plan of care [] Alter current plan (see comments)  [] Plan of care initiated [] Hold pending MD visit [x] Discharge    Electronically signed by: Gordo Melchor PT, PT      Note: If patient does not return for scheduled/ recommended follow up visits, this note will serve as a discharge from care along with most recent update on progress.

## 2022-04-27 RX ORDER — SIMVASTATIN 40 MG
TABLET ORAL
Qty: 90 TABLET | Refills: 1 | Status: SHIPPED | OUTPATIENT
Start: 2022-04-27

## 2022-04-27 NOTE — TELEPHONE ENCOUNTER
Last appointment: 2/22/2022  Next appointment: 8/23/2022  Last refill: 12/20/2021 90 day supply plus 1 additional refill

## 2022-05-05 RX ORDER — LEVOTHYROXINE SODIUM 0.15 MG/1
150 TABLET ORAL DAILY
Qty: 90 TABLET | Refills: 1 | Status: SHIPPED | OUTPATIENT
Start: 2022-05-05

## 2022-05-05 RX ORDER — HYDROCHLOROTHIAZIDE 25 MG/1
TABLET ORAL
Qty: 90 TABLET | Refills: 1 | Status: SHIPPED | OUTPATIENT
Start: 2022-05-05

## 2022-06-07 RX ORDER — ESCITALOPRAM OXALATE 10 MG/1
10 TABLET ORAL DAILY
Qty: 30 TABLET | Refills: 3 | Status: SHIPPED | OUTPATIENT
Start: 2022-06-07 | End: 2022-08-23

## 2022-06-24 ENCOUNTER — E-VISIT (OUTPATIENT)
Dept: INTERNAL MEDICINE CLINIC | Age: 72
End: 2022-06-24
Payer: MEDICARE

## 2022-06-24 DIAGNOSIS — J01.90 ACUTE NON-RECURRENT SINUSITIS, UNSPECIFIED LOCATION: Primary | ICD-10-CM

## 2022-06-24 PROCEDURE — 99421 OL DIG E/M SVC 5-10 MIN: CPT | Performed by: INTERNAL MEDICINE

## 2022-06-24 ASSESSMENT — LIFESTYLE VARIABLES
SMOKING_YEARS: 3
SMOKING_STATUS: NO, I'M A FORMER SMOKER
PACKS_PER_DAY: 0

## 2022-06-25 RX ORDER — FLUCONAZOLE 150 MG/1
150 TABLET ORAL ONCE
Qty: 2 TABLET | Refills: 0 | Status: SHIPPED | OUTPATIENT
Start: 2022-06-25 | End: 2022-06-25

## 2022-06-25 RX ORDER — AMOXICILLIN AND CLAVULANATE POTASSIUM 875; 125 MG/1; MG/1
1 TABLET, FILM COATED ORAL 2 TIMES DAILY
Qty: 20 TABLET | Refills: 0 | Status: SHIPPED | OUTPATIENT
Start: 2022-06-25 | End: 2022-10-03 | Stop reason: SDUPTHER

## 2022-06-25 NOTE — PROGRESS NOTES
HPI: as per patient provided history  Exam: N/A (electronic visit)  ASSESSMENT/PLAN:  1. Acute non-recurrent sinusitis, unspecified location  Likely post-viral. Cannot exclude COVID as trigger, so patient advised to wear mask when around others for at least 10 days after onset of symptoms. - amoxicillin-clavulanate (AUGMENTIN) 875-125 MG per tablet; Take 1 tablet by mouth 2 times daily for 10 days Take with meals. Dispense: 20 tablet; Refill: 0  - fluconazole (DIFLUCAN) 150 MG tablet; Take 1 tablet by mouth once for 1 dose May repeat in 5 days, if symptoms persist or recur. Dispense: 2 tablet; Refill: 0    Patient instructed to call the office if worsens, or fails to improve as anticipated. 5-10 minutes were spent on the digital evaluation and management of this patient.

## 2022-08-04 RX ORDER — LOSARTAN POTASSIUM 50 MG/1
50 TABLET ORAL DAILY
Qty: 90 TABLET | Refills: 1 | Status: SHIPPED | OUTPATIENT
Start: 2022-08-04

## 2022-08-14 ENCOUNTER — PATIENT MESSAGE (OUTPATIENT)
Dept: INTERNAL MEDICINE CLINIC | Age: 72
End: 2022-08-14

## 2022-08-14 DIAGNOSIS — E03.4 HYPOTHYROIDISM DUE TO ACQUIRED ATROPHY OF THYROID: ICD-10-CM

## 2022-08-14 DIAGNOSIS — E78.00 PURE HYPERCHOLESTEROLEMIA: Primary | ICD-10-CM

## 2022-08-14 DIAGNOSIS — R73.9 HYPERGLYCEMIA: ICD-10-CM

## 2022-08-14 DIAGNOSIS — I10 PRIMARY HYPERTENSION: ICD-10-CM

## 2022-08-15 NOTE — TELEPHONE ENCOUNTER
From: Murray Pollack  To: Dr. Tremayne Agudelo  Sent: 8/14/2022 11:13 PM EDT  Subject: Upcoming appointment     Hello,    My appointment is 8/23 and am I good with blood work orders in computer? Ill go to [facility] a few days before my appointment. Have a great & safe week!     Lisa Rodgers

## 2022-08-18 ENCOUNTER — HOSPITAL ENCOUNTER (OUTPATIENT)
Age: 72
Discharge: HOME | End: 2022-08-18
Payer: MEDICARE

## 2022-08-18 DIAGNOSIS — E78.00 PURE HYPERCHOLESTEROLEMIA: ICD-10-CM

## 2022-08-18 DIAGNOSIS — E03.4 HYPOTHYROIDISM DUE TO ACQUIRED ATROPHY OF THYROID: ICD-10-CM

## 2022-08-18 DIAGNOSIS — R73.9 HYPERGLYCEMIA: ICD-10-CM

## 2022-08-18 DIAGNOSIS — I10 PRIMARY HYPERTENSION: ICD-10-CM

## 2022-08-18 LAB
A/G RATIO: 1.4 (ref 1.1–2.2)
ALBUMIN SERPL-MCNC: 4.2 G/DL (ref 3.4–5)
ALP BLD-CCNC: 69 U/L (ref 40–129)
ALT SERPL-CCNC: 15 U/L (ref 10–40)
ANION GAP SERPL CALCULATED.3IONS-SCNC: 11 MMOL/L (ref 3–16)
AST SERPL-CCNC: 22 U/L (ref 15–37)
BILIRUB SERPL-MCNC: 1 MG/DL (ref 0–1)
BUN BLDV-MCNC: 20 MG/DL (ref 7–20)
CALCIUM SERPL-MCNC: 9.1 MG/DL (ref 8.3–10.6)
CHLORIDE BLD-SCNC: 103 MMOL/L (ref 99–110)
CHOLESTEROL, FASTING: 188 MG/DL (ref 0–199)
CO2: 27 MMOL/L (ref 21–32)
CREAT SERPL-MCNC: 1 MG/DL (ref 0.6–1.2)
ESTIMATED AVERAGE GLUCOSE: 125.5 MG/DL
GFR AFRICAN AMERICAN: >60
GFR NON-AFRICAN AMERICAN: 55
GLUCOSE FASTING: 97 MG/DL (ref 70–99)
HBA1C MFR BLD: 6 %
HDLC SERPL-MCNC: 40 MG/DL (ref 40–60)
LDL CHOLESTEROL CALCULATED: 129 MG/DL
POTASSIUM SERPL-SCNC: 3.9 MMOL/L (ref 3.5–5.1)
SODIUM BLD-SCNC: 141 MMOL/L (ref 136–145)
TOTAL PROTEIN: 7.1 G/DL (ref 6.4–8.2)
TRIGLYCERIDE, FASTING: 94 MG/DL (ref 0–150)
TSH SERPL DL<=0.05 MIU/L-ACNC: 1.6 UIU/ML (ref 0.27–4.2)
VLDLC SERPL CALC-MCNC: 19 MG/DL

## 2022-08-18 PROCEDURE — 80061 LIPID PANEL: CPT

## 2022-08-18 PROCEDURE — 84443 ASSAY THYROID STIM HORMONE: CPT

## 2022-08-18 PROCEDURE — 36415 COLL VENOUS BLD VENIPUNCTURE: CPT

## 2022-08-18 PROCEDURE — 80053 COMPREHEN METABOLIC PANEL: CPT

## 2022-08-18 PROCEDURE — 83036 HEMOGLOBIN GLYCOSYLATED A1C: CPT

## 2022-08-22 NOTE — PROGRESS NOTES
Date of Visit:  2022    CC: Chayito Kellogg (: 1950) is a 70 y.o. female, established patient, here for evaluation/re-evaluation of the following medical concerns:    ASSESSMENT/PLAN:  Primary hypertension  Assessment & Plan:  Well-controlled. Continue current antihypertensive regimen. Pure hypercholesterolemia  Assessment & Plan:  Recent LDL higher than prior reading but still at goal of < 130. Continue current dose of simvastatin. Hyperglycemia  Assessment & Plan:  Stable, asymptomatic. Importance of lifestyle changes stressed to help prevent progression to diabetes. Hypothyroidism due to acquired atrophy of thyroid  Assessment & Plan:  Clinically and biochemically euthyroid. Continue current dose of Synthroid. Anxiety  Assessment & Plan:  Slow improvement. Adequately controlled on 15 mg dose of Lexapro- continue. CBT suggested with [facility] to address residual symptoms. Post traumatic stress disorder  Assessment & Plan:  See plan for anxiety. Psychophysiological insomnia  Assessment & Plan:  Doing well on occasional Ambien- risks and benefits of this medication discussed. Orders:  -     zolpidem (AMBIEN) 5 MG tablet; Take 1 tablet by mouth as needed for Sleep for up to 14 days. , Disp-30 tablet, R-1Normal  Thyroid nodule  Assessment & Plan:  Asymptomatic. Due for repeat thyroid US, which was ordered. Orders:  -     US THYROID; Future  Class 1 obesity due to excess calories with serious comorbidity and body mass index (BMI) of 30.0 to 30.9 in adult  Assessment & Plan:  No progress. Assessed behavioral health risks and factors affecting choice of behavior. Suggested weight control approaches, including dietary changes behavioral modification and follow up plan. Provided educational and support documentation. Time spent (minutes): 15   Body mass index 30.0-30.9, adult  -     Obesity Counseling, 15 Minutes     Return in about 6 months (around 2023) for MEDICARE AWV. Vitals:    22 0938   BP: 108/70   Site: Right Upper Arm   Position: Sitting   Cuff Size: Medium Adult   Pulse: 68   Weight: 196 lb (88.9 kg)      Estimated body mass index is 30.7 kg/m² as calculated from the following:    Height as of 22: 5' 7\" (1.702 m). Weight as of this encounter: 196 lb (88.9 kg). Wt Readings from Last 3 Encounters:   22 196 lb (88.9 kg)   22 193 lb 6.4 oz (87.7 kg)   21 196 lb (88.9 kg)     BP Readings from Last 3 Encounters:   22 108/70   22 122/68   21 112/72     HPI  Hypertension:  Home blood pressure monitorin-122s/low 80s. She is adherent to a low sodium diet. Patient denies chest pain, shortness of breath,  lightheadedness and palpitations. Antihypertensive medication side effects: no medication side effects noted. Use of agents associated with hypertension: heavy use of ibuprofen over the past 2 years, but has backed off over the past 2 months- only using 400 mg 2x/week now. Hyperlipidemia/hyperglycemia:  No new myalgias or GI upset on simvastatin (Zocor). Medication compliance: compliant all of the time. Patient is following a low fat, low cholesterol diet, and has cut back on simple carbs overall, but still room for improvement. She is exercising regularly- treadmill or exercise bicycle daily 4x/week, home PT exercises. No polyuria and polydipsia.       Lab Results   Component Value Date/Time    GLUF 97 2022 07:20 AM    GLUF 104 2022 07:52 AM    LABA1C 6.0 2022 07:20 AM    LABA1C 5.9 2022 07:52 AM      Lab Results   Component Value Date/Time    LDLCALC 129 2022 07:20 AM    LDLCALC 97 2022 07:52 AM    HDL 40 2022 07:20 AM    HDL 42 2022 07:52 AM    TRIGLYCFAST 94 2022 07:20 AM    TRIGLYCFAST 93 2022 07:52 AM    ALT 15 2022 07:20 AM    ALT 13 2022 07:52 AM    AST 22 2022 07:20 AM    AST 20 2022 07:52 AM       Hypothyroidism: Recent symptoms: none. She denies fatigue, weight changes, cold intolerance, heat intolerance, hair loss, dry skin, constipation, diarrhea and palpitations. Patient is taking her medication consistently on an empty stomach. Lab Results   Component Value Date/Time    TSHREFLEX 0.37 06/04/2019 11:12 AM    TSH 1.60 08/18/2022 07:20 AM    TSH 1.69 02/21/2022 07:52 AM      Anxiety/PTSD/Insomnia:  Still having anxiety with driving, but slowly improving- only on the highway. No longer seeing psychologist.  Doing well on 15 mg dose of Lexapro- taking consistently. No side effects. Taking Ambien >1x/week- working well, no side effects. ROS  As documented above    Physical Exam  Constitutional:       General: She is not in acute distress. Appearance: She is well-developed. Eyes:      Conjunctiva/sclera: Conjunctivae normal.   Neck:      Thyroid: No thyroid mass or thyromegaly. Cardiovascular:      Rate and Rhythm: Normal rate and regular rhythm. Heart sounds: Normal heart sounds. No murmur heard. No friction rub. No gallop. Pulmonary:      Effort: Pulmonary effort is normal. No respiratory distress. Breath sounds: Normal breath sounds. No wheezing, rhonchi or rales. Musculoskeletal:      Right lower leg: No edema. Left lower leg: No edema. Lymphadenopathy:      Cervical: No cervical adenopathy. Skin:     General: Skin is warm and dry. Findings: No erythema or rash. Neurological:      Mental Status: She is alert and oriented to person, place, and time. Psychiatric:         Mood and Affect: Mood normal.         Speech: Speech normal.         Behavior: Behavior normal.         Thought Content:  Thought content normal.         Cognition and Memory: Cognition normal.         Judgment: Judgment normal.     On this date 8/23/2022 I have spent 35 minutes reviewing previous notes, test results and face to face with the patient discussing the diagnosis and importance of compliance with the treatment plan as well as documenting on the day of the visit. --Tremayne Agudelo MD on 8/23/2022 at 5:08 PM    An electronic signature was used to authenticate this note.

## 2022-08-23 ENCOUNTER — OFFICE VISIT (OUTPATIENT)
Dept: INTERNAL MEDICINE CLINIC | Age: 72
End: 2022-08-23
Payer: MEDICARE

## 2022-08-23 VITALS
WEIGHT: 196 LBS | SYSTOLIC BLOOD PRESSURE: 108 MMHG | DIASTOLIC BLOOD PRESSURE: 70 MMHG | HEART RATE: 68 BPM | BODY MASS INDEX: 30.7 KG/M2

## 2022-08-23 DIAGNOSIS — F41.9 ANXIETY: ICD-10-CM

## 2022-08-23 DIAGNOSIS — I10 PRIMARY HYPERTENSION: Primary | ICD-10-CM

## 2022-08-23 DIAGNOSIS — E03.4 HYPOTHYROIDISM DUE TO ACQUIRED ATROPHY OF THYROID: ICD-10-CM

## 2022-08-23 DIAGNOSIS — F51.04 PSYCHOPHYSIOLOGICAL INSOMNIA: ICD-10-CM

## 2022-08-23 DIAGNOSIS — E04.1 THYROID NODULE: ICD-10-CM

## 2022-08-23 DIAGNOSIS — E66.09 CLASS 1 OBESITY DUE TO EXCESS CALORIES WITH SERIOUS COMORBIDITY AND BODY MASS INDEX (BMI) OF 30.0 TO 30.9 IN ADULT: ICD-10-CM

## 2022-08-23 DIAGNOSIS — R73.9 HYPERGLYCEMIA: ICD-10-CM

## 2022-08-23 DIAGNOSIS — E78.00 PURE HYPERCHOLESTEROLEMIA: ICD-10-CM

## 2022-08-23 DIAGNOSIS — F43.10 POST TRAUMATIC STRESS DISORDER: ICD-10-CM

## 2022-08-23 PROCEDURE — 99214 OFFICE O/P EST MOD 30 MIN: CPT | Performed by: INTERNAL MEDICINE

## 2022-08-23 PROCEDURE — 1036F TOBACCO NON-USER: CPT | Performed by: INTERNAL MEDICINE

## 2022-08-23 PROCEDURE — 1090F PRES/ABSN URINE INCON ASSESS: CPT | Performed by: INTERNAL MEDICINE

## 2022-08-23 PROCEDURE — G8417 CALC BMI ABV UP PARAM F/U: HCPCS | Performed by: INTERNAL MEDICINE

## 2022-08-23 PROCEDURE — G0447 BEHAVIOR COUNSEL OBESITY 15M: HCPCS | Performed by: INTERNAL MEDICINE

## 2022-08-23 PROCEDURE — 3017F COLORECTAL CA SCREEN DOC REV: CPT | Performed by: INTERNAL MEDICINE

## 2022-08-23 PROCEDURE — G8427 DOCREV CUR MEDS BY ELIG CLIN: HCPCS | Performed by: INTERNAL MEDICINE

## 2022-08-23 PROCEDURE — 1123F ACP DISCUSS/DSCN MKR DOCD: CPT | Performed by: INTERNAL MEDICINE

## 2022-08-23 PROCEDURE — G8399 PT W/DXA RESULTS DOCUMENT: HCPCS | Performed by: INTERNAL MEDICINE

## 2022-08-23 RX ORDER — ZOLPIDEM TARTRATE 5 MG/1
5 TABLET ORAL PRN
Qty: 30 TABLET | Refills: 1 | Status: SHIPPED | OUTPATIENT
Start: 2022-08-23 | End: 2022-09-06

## 2022-08-23 RX ORDER — ESCITALOPRAM OXALATE 10 MG/1
15 TABLET ORAL DAILY
COMMUNITY
Start: 2022-08-23 | End: 2022-09-22

## 2022-08-23 SDOH — ECONOMIC STABILITY: FOOD INSECURITY: WITHIN THE PAST 12 MONTHS, THE FOOD YOU BOUGHT JUST DIDN'T LAST AND YOU DIDN'T HAVE MONEY TO GET MORE.: NEVER TRUE

## 2022-08-23 SDOH — ECONOMIC STABILITY: FOOD INSECURITY: WITHIN THE PAST 12 MONTHS, YOU WORRIED THAT YOUR FOOD WOULD RUN OUT BEFORE YOU GOT MONEY TO BUY MORE.: NEVER TRUE

## 2022-08-23 ASSESSMENT — SOCIAL DETERMINANTS OF HEALTH (SDOH): HOW HARD IS IT FOR YOU TO PAY FOR THE VERY BASICS LIKE FOOD, HOUSING, MEDICAL CARE, AND HEATING?: NOT HARD AT ALL

## 2022-08-23 NOTE — ASSESSMENT & PLAN NOTE
Recent LDL higher than prior reading but still at goal of < 130. Continue current dose of simvastatin.

## 2022-08-23 NOTE — ASSESSMENT & PLAN NOTE
No progress. Assessed behavioral health risks and factors affecting choice of behavior. Suggested weight control approaches, including dietary changes behavioral modification and follow up plan. Provided educational and support documentation.   Time spent (minutes): 15

## 2022-08-23 NOTE — ASSESSMENT & PLAN NOTE
Slow improvement. Adequately controlled on 15 mg dose of Lexapro- continue. CBT suggested with [facility] to address residual symptoms.

## 2022-08-23 NOTE — ASSESSMENT & PLAN NOTE
Stable, asymptomatic. Importance of lifestyle changes stressed to help prevent progression to diabetes.

## 2022-09-22 ENCOUNTER — OFFICE VISIT (OUTPATIENT)
Dept: ENT CLINIC | Age: 72
End: 2022-09-22
Payer: MEDICARE

## 2022-09-22 VITALS — TEMPERATURE: 97.1 F | SYSTOLIC BLOOD PRESSURE: 135 MMHG | HEART RATE: 77 BPM | DIASTOLIC BLOOD PRESSURE: 84 MMHG

## 2022-09-22 DIAGNOSIS — E04.9 GOITER: Chronic | ICD-10-CM

## 2022-09-22 DIAGNOSIS — H61.22 IMPACTED CERUMEN OF LEFT EAR: ICD-10-CM

## 2022-09-22 DIAGNOSIS — H90.12 CONDUCTIVE HEARING LOSS OF LEFT EAR WITH UNRESTRICTED HEARING OF RIGHT EAR: ICD-10-CM

## 2022-09-22 DIAGNOSIS — L30.9 DERMATITIS: Primary | Chronic | ICD-10-CM

## 2022-09-22 PROCEDURE — G8399 PT W/DXA RESULTS DOCUMENT: HCPCS | Performed by: OTOLARYNGOLOGY

## 2022-09-22 PROCEDURE — 1036F TOBACCO NON-USER: CPT | Performed by: OTOLARYNGOLOGY

## 2022-09-22 PROCEDURE — G8427 DOCREV CUR MEDS BY ELIG CLIN: HCPCS | Performed by: OTOLARYNGOLOGY

## 2022-09-22 PROCEDURE — 1090F PRES/ABSN URINE INCON ASSESS: CPT | Performed by: OTOLARYNGOLOGY

## 2022-09-22 PROCEDURE — 69210 REMOVE IMPACTED EAR WAX UNI: CPT | Performed by: OTOLARYNGOLOGY

## 2022-09-22 PROCEDURE — 99203 OFFICE O/P NEW LOW 30 MIN: CPT | Performed by: OTOLARYNGOLOGY

## 2022-09-22 PROCEDURE — 1123F ACP DISCUSS/DSCN MKR DOCD: CPT | Performed by: OTOLARYNGOLOGY

## 2022-09-22 PROCEDURE — 3017F COLORECTAL CA SCREEN DOC REV: CPT | Performed by: OTOLARYNGOLOGY

## 2022-09-22 PROCEDURE — G8417 CALC BMI ABV UP PARAM F/U: HCPCS | Performed by: OTOLARYNGOLOGY

## 2022-09-22 RX ORDER — ESCITALOPRAM OXALATE 10 MG/1
TABLET ORAL
Qty: 30 TABLET | Refills: 5 | Status: SHIPPED | OUTPATIENT
Start: 2022-09-22 | End: 2022-10-31 | Stop reason: SDUPTHER

## 2022-09-22 ASSESSMENT — ENCOUNTER SYMPTOMS
SORE THROAT: 0
SINUS PAIN: 0
RHINORRHEA: 0

## 2022-09-22 NOTE — PATIENT INSTRUCTIONS
Schedule an audiogram (hearing test), if your hearing is not back to your usual ability, or if hearing loss or tinnitus (ringing or other noise) persists, despite removal of ear wax,. Schedule an appointment for ear recheck and possible cleaning in the future if your hearing is decreased, or you have a sensation of ear wax build up or are told you have ear wax build up by your primary physician or other health care provider. You may use an over the counter ear wax removal kit (such as Murine, Bausch and Lomb, NeilMed, or Debrox wax removal system) for ear wax removal, as needed. It may help to use Debrox (OTC) for 4 days prior to future visits for ear cleaning. This may soften your ear wax and facilitate removal of the wax. NO Q-TIPS OR OTHER INSTRUMENTS/OBJECTS IN THE EARS   You should never clean your ears with a Q-tip, cotton tipped applicator, Tiffanie pin, paper clip, safety pin, pen cap, or any other instrument. This will tend to push wax in deeper and pack the ear canal with wax. There is a high risk and danger of this practice, especially rupture of ear drum, dislocation or other damage to ossicles, and permanent, irreversible, and irreparable hearing loss. It may cause inflammation and irritation of the ear canal and cause itching or pain. I recommend only use of one the several ear wax removal kits available \"over the counter\" if you feel a need to try to remove ear wax. For example, Murine, Bausch and Lomb, NeilMed, or Debrox ear wax removal kits may be used for ear wax removal, as needed. No other methods should be self used for cleaning your ears.
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
## 2022-10-03 ENCOUNTER — E-VISIT (OUTPATIENT)
Dept: INTERNAL MEDICINE CLINIC | Age: 72
End: 2022-10-03
Payer: MEDICARE

## 2022-10-03 DIAGNOSIS — J01.90 ACUTE SINUSITIS, RECURRENCE NOT SPECIFIED, UNSPECIFIED LOCATION: Primary | ICD-10-CM

## 2022-10-03 PROCEDURE — 99421 OL DIG E/M SVC 5-10 MIN: CPT | Performed by: INTERNAL MEDICINE

## 2022-10-03 RX ORDER — AMOXICILLIN AND CLAVULANATE POTASSIUM 875; 125 MG/1; MG/1
1 TABLET, FILM COATED ORAL 2 TIMES DAILY
Qty: 20 TABLET | Refills: 0 | Status: SHIPPED | OUTPATIENT
Start: 2022-10-03 | End: 2022-10-13

## 2022-10-03 ASSESSMENT — LIFESTYLE VARIABLES
PACKS_PER_DAY: 0
SMOKING_YEARS: 5
SMOKING_STATUS: NO, I'M A FORMER SMOKER

## 2022-10-03 NOTE — PROGRESS NOTES
HPI: as per patient provided history  Exam: N/A (electronic visit)  ASSESSMENT/PLAN:  1. Acute sinusitis, recurrence not specified, unspecified location  Likely post-viral.  Supportive care guidelines provided. She was advised to wear a mask when around others for the next 5 days since she did not test for COVID. - amoxicillin-clavulanate (AUGMENTIN) 875-125 MG per tablet; Take 1 tablet by mouth 2 times daily for 10 days Take with meals. Dispense: 20 tablet; Refill: 0    Patient instructed to call the office if worsens, or fails to improve as anticipated. 5-10 minutes were spent on the digital evaluation and management of this patient.

## 2022-10-24 ENCOUNTER — HOSPITAL ENCOUNTER (OUTPATIENT)
Dept: ULTRASOUND IMAGING | Age: 72
Discharge: HOME | End: 2022-10-24
Payer: MEDICARE

## 2022-10-24 DIAGNOSIS — E04.1 THYROID NODULE: ICD-10-CM

## 2022-10-24 PROCEDURE — 76536 US EXAM OF HEAD AND NECK: CPT

## 2022-10-30 ENCOUNTER — PATIENT MESSAGE (OUTPATIENT)
Dept: INTERNAL MEDICINE CLINIC | Age: 72
End: 2022-10-30

## 2022-10-31 RX ORDER — ESCITALOPRAM OXALATE 10 MG/1
TABLET ORAL
Qty: 45 TABLET | Refills: 5 | Status: SHIPPED | OUTPATIENT
Start: 2022-10-31 | End: 2022-10-31

## 2022-10-31 RX ORDER — IBUPROFEN 800 MG/1
TABLET ORAL
Qty: 60 TABLET | Refills: 2 | OUTPATIENT
Start: 2022-10-31

## 2022-10-31 RX ORDER — ESCITALOPRAM OXALATE 10 MG/1
TABLET ORAL
Qty: 135 TABLET | Refills: 1 | Status: SHIPPED | OUTPATIENT
Start: 2022-10-31

## 2022-10-31 NOTE — TELEPHONE ENCOUNTER
From: Jaky Carreno  To: Dr. Inocencio Robles  Sent: 10/30/2022 8:35 PM EDT  Subject: Escitalopram 10MG - 30 tablets    Hi Dr. Elissa Garcia,    This prescription has a total of 5 refills left. Last filled on October 14,2022 - 30 tablets. Not refillable until November 14,2022 (30 tablets) which Ill runout b4 that date due to taking 15MG a day. I was wondering is it possible to get the quantity changed and increase quantity to accommodate 15 MG on a monthly basis?     Sincerely,    Jamison Arreguin

## 2022-10-31 NOTE — TELEPHONE ENCOUNTER
This dose was discontinued at her visit in August- she is supposed to be taking 400 mg OTC no more than 2x/week. Please call patient to confirm that this is what she is still doing.

## 2023-01-12 ENCOUNTER — TELEPHONE (OUTPATIENT)
Dept: INTERNAL MEDICINE CLINIC | Age: 73
End: 2023-01-12

## 2023-01-12 DIAGNOSIS — I10 PRIMARY HYPERTENSION: Primary | ICD-10-CM

## 2023-01-12 DIAGNOSIS — E78.00 PURE HYPERCHOLESTEROLEMIA: ICD-10-CM

## 2023-01-12 DIAGNOSIS — R73.9 HYPERGLYCEMIA: ICD-10-CM

## 2023-01-12 DIAGNOSIS — E03.4 HYPOTHYROIDISM DUE TO ACQUIRED ATROPHY OF THYROID: ICD-10-CM

## 2023-01-12 NOTE — TELEPHONE ENCOUNTER
----- Message from Shelli Blank sent at 1/12/2023 11:22 AM EST -----  Subject: Referral Request    Reason for referral request? Patient needs blood work for appointment on   2/21/23, wants to get it done the week beforehand. Provider patient wants to be referred to(if known):     Provider Phone Number(if known):     Additional Information for Provider?   ---------------------------------------------------------------------------  --------------  [id number]    [phone]; OK to leave message on voicemail  ---------------------------------------------------------------------------  --------------

## 2023-01-30 RX ORDER — LEVOTHYROXINE SODIUM 0.15 MG/1
150 TABLET ORAL DAILY
Qty: 90 TABLET | Refills: 1 | Status: SHIPPED | OUTPATIENT
Start: 2023-01-30

## 2023-01-30 RX ORDER — HYDROCHLOROTHIAZIDE 25 MG/1
TABLET ORAL
Qty: 90 TABLET | Refills: 1 | Status: SHIPPED | OUTPATIENT
Start: 2023-01-30

## 2023-03-09 ENCOUNTER — HOSPITAL ENCOUNTER (OUTPATIENT)
Age: 73
Discharge: HOME | End: 2023-03-09
Payer: MEDICARE

## 2023-03-09 DIAGNOSIS — I10 PRIMARY HYPERTENSION: ICD-10-CM

## 2023-03-09 DIAGNOSIS — E03.4 HYPOTHYROIDISM DUE TO ACQUIRED ATROPHY OF THYROID: ICD-10-CM

## 2023-03-09 DIAGNOSIS — E78.00 PURE HYPERCHOLESTEROLEMIA: ICD-10-CM

## 2023-03-09 DIAGNOSIS — R73.9 HYPERGLYCEMIA: ICD-10-CM

## 2023-03-09 LAB
A/G RATIO: 1.3 (ref 1.1–2.2)
ALBUMIN SERPL-MCNC: 3.8 G/DL (ref 3.4–5)
ALP BLD-CCNC: 70 U/L (ref 40–129)
ALT SERPL-CCNC: 17 U/L (ref 10–40)
ANION GAP SERPL CALCULATED.3IONS-SCNC: 11 MMOL/L (ref 3–16)
AST SERPL-CCNC: 22 U/L (ref 15–37)
BILIRUB SERPL-MCNC: 0.5 MG/DL (ref 0–1)
BUN BLDV-MCNC: 15 MG/DL (ref 7–20)
CALCIUM SERPL-MCNC: 8.9 MG/DL (ref 8.3–10.6)
CHLORIDE BLD-SCNC: 105 MMOL/L (ref 99–110)
CHOLESTEROL, FASTING: 162 MG/DL (ref 0–199)
CO2: 26 MMOL/L (ref 21–32)
CREAT SERPL-MCNC: 0.7 MG/DL (ref 0.6–1.2)
GFR SERPL CREATININE-BSD FRML MDRD: >60 ML/MIN/{1.73_M2}
GLUCOSE FASTING: 126 MG/DL (ref 70–99)
HDLC SERPL-MCNC: 42 MG/DL (ref 40–60)
LDL CHOLESTEROL CALCULATED: 105 MG/DL
POTASSIUM SERPL-SCNC: 4.2 MMOL/L (ref 3.5–5.1)
SODIUM BLD-SCNC: 142 MMOL/L (ref 136–145)
TOTAL PROTEIN: 6.7 G/DL (ref 6.4–8.2)
TRIGLYCERIDE, FASTING: 76 MG/DL (ref 0–150)
TSH SERPL DL<=0.05 MIU/L-ACNC: 3.63 UIU/ML (ref 0.27–4.2)
VLDLC SERPL CALC-MCNC: 15 MG/DL

## 2023-03-09 PROCEDURE — 80061 LIPID PANEL: CPT

## 2023-03-09 PROCEDURE — 36415 COLL VENOUS BLD VENIPUNCTURE: CPT

## 2023-03-09 PROCEDURE — 84443 ASSAY THYROID STIM HORMONE: CPT

## 2023-03-09 PROCEDURE — 80053 COMPREHEN METABOLIC PANEL: CPT

## 2023-03-09 PROCEDURE — 83036 HEMOGLOBIN GLYCOSYLATED A1C: CPT

## 2023-03-10 LAB
ESTIMATED AVERAGE GLUCOSE: 122.6 MG/DL
HBA1C MFR BLD: 5.9 %

## 2023-03-14 SDOH — HEALTH STABILITY: PHYSICAL HEALTH: ON AVERAGE, HOW MANY DAYS PER WEEK DO YOU ENGAGE IN MODERATE TO STRENUOUS EXERCISE (LIKE A BRISK WALK)?: 7 DAYS

## 2023-03-14 SDOH — ECONOMIC STABILITY: FOOD INSECURITY: WITHIN THE PAST 12 MONTHS, THE FOOD YOU BOUGHT JUST DIDN'T LAST AND YOU DIDN'T HAVE MONEY TO GET MORE.: NEVER TRUE

## 2023-03-14 SDOH — ECONOMIC STABILITY: INCOME INSECURITY: HOW HARD IS IT FOR YOU TO PAY FOR THE VERY BASICS LIKE FOOD, HOUSING, MEDICAL CARE, AND HEATING?: NOT HARD AT ALL

## 2023-03-14 SDOH — ECONOMIC STABILITY: FOOD INSECURITY: WITHIN THE PAST 12 MONTHS, YOU WORRIED THAT YOUR FOOD WOULD RUN OUT BEFORE YOU GOT MONEY TO BUY MORE.: NEVER TRUE

## 2023-03-14 SDOH — HEALTH STABILITY: PHYSICAL HEALTH: ON AVERAGE, HOW MANY MINUTES DO YOU ENGAGE IN EXERCISE AT THIS LEVEL?: 30 MIN

## 2023-03-14 ASSESSMENT — PATIENT HEALTH QUESTIONNAIRE - PHQ9
1. LITTLE INTEREST OR PLEASURE IN DOING THINGS: 0
SUM OF ALL RESPONSES TO PHQ QUESTIONS 1-9: 0
SUM OF ALL RESPONSES TO PHQ QUESTIONS 1-9: 0
SUM OF ALL RESPONSES TO PHQ9 QUESTIONS 1 & 2: 0
2. FEELING DOWN, DEPRESSED OR HOPELESS: 0
SUM OF ALL RESPONSES TO PHQ QUESTIONS 1-9: 0
SUM OF ALL RESPONSES TO PHQ QUESTIONS 1-9: 0

## 2023-03-14 ASSESSMENT — LIFESTYLE VARIABLES
HOW OFTEN DO YOU HAVE SIX OR MORE DRINKS ON ONE OCCASION: 1
HOW OFTEN DO YOU HAVE A DRINK CONTAINING ALCOHOL: 3
HOW OFTEN DO YOU HAVE A DRINK CONTAINING ALCOHOL: 2-4 TIMES A MONTH
HOW MANY STANDARD DRINKS CONTAINING ALCOHOL DO YOU HAVE ON A TYPICAL DAY: 1
HOW MANY STANDARD DRINKS CONTAINING ALCOHOL DO YOU HAVE ON A TYPICAL DAY: 1 OR 2

## 2023-03-16 NOTE — PROGRESS NOTES
Medicare Annual Wellness Visit    Arnold Calvin is here for Medicare AWV    Assessment & Plan   Medicare annual wellness visit, subsequent  COVID booster recommended  Flu shot recommended in the fall  Primary hypertension  Assessment & Plan:  Well-controlled. Continue current antihypertensive regimen. Orders:  -     Comprehensive Metabolic Panel, Fasting; Future  Pure hypercholesterolemia  Assessment & Plan:  Recent lipid panel improved. Continue current dose of simvastatin. Orders:  -     Lipid, Fasting; Future  Hypothyroidism due to acquired atrophy of thyroid  Assessment & Plan:  Clinically euthyroid, but will adjust levothyroxine dose if indicated by lab results. Orders:  -     TSH; Future  Hyperglycemia  Assessment & Plan:  FBG worse, but HgbA1c slightly better. Importance of lifestyle changes stressed to help prevent progression to diabetes. Orders:  -     Hemoglobin A1C; Future  Post traumatic stress disorder  Assessment & Plan:  Adequately controlled on 15 mg dose of Lexapro- continue. CBT suggested with [facility] to address residual symptoms. Class 1 obesity due to excess calories with serious comorbidity and body mass index (BMI) of 31.0 to 31.9 in adult  Assessment & Plan:  Patient was asked about her current diet and exercise habits, and personalized advice was provided regarding recommended lifestyle changes. Patient's comorbid health conditions associated with elevated BMI were discussed, as well as the likely benefits of weight loss. Based upon patient's motivation to change her behavior, the following plan was agreed upon to work toward a weight loss goal of 15 pounds: lower carbohydrate diet. Educational materials for weight loss were provided. Patient will follow-up in 6 month(s) with PCP. Provider spent 15 minutes counseling patient.    BMI 31.0-31.9,adult  -     [place] Behavior  obesity (8-15 min) []    Recommendations for Preventive Services Due: see orders and patient instructions/AVS.  Recommended screening schedule for the next 5-10 years is provided to the patient in written form: see Patient Instructions/AVS.     Return in about 6 months (around 9/17/2023) for 30 MIN F/U. Subjective       Patient's complete Health Risk Assessment and screening values have been reviewed and are found in Flowsheets. The following problems were reviewed today and where indicated follow up appointments were made and/or referrals ordered. Positive Risk Factor Screenings with Interventions:                 Weight and Activity:  Physical Activity: Sufficiently Active    Days of Exercise per Week: 7 days    Minutes of Exercise per Session: 30 min     On average, how many days per week do you engage in moderate to strenuous exercise (like a brisk walk)?: 7 days  Have you lost any weight without trying in the past 3 months?: No  Body mass index: (!) 31.23  Obesity Interventions:  See A/P for plan and any pertinent orders                       Objective   Vitals:    03/17/23 1048   BP: 106/64   Site: Right Upper Arm   Position: Sitting   Cuff Size: Medium Adult   Pulse: 80   Weight: 195 lb (88.5 kg)   Height: 5' 6.25\" (1.683 m)      Body mass index is 31.24 kg/m². Physical Exam  Constitutional:       General: She is not in acute distress. Appearance: She is well-developed. Eyes:      Conjunctiva/sclera: Conjunctivae normal.   Neck:      Thyroid: No thyroid mass or thyromegaly. Cardiovascular:      Rate and Rhythm: Normal rate and regular rhythm. Heart sounds: Normal heart sounds. No murmur heard. No friction rub. No gallop. Pulmonary:      Effort: Pulmonary effort is normal. No respiratory distress. Breath sounds: Normal breath sounds. No wheezing, rhonchi or rales. Musculoskeletal:      Right lower leg: No edema. Left lower leg: No edema. Lymphadenopathy:      Cervical: No cervical adenopathy. Skin:     General: Skin is warm and dry.       Findings: No erythema or rash. Neurological:      Mental Status: She is alert and oriented to person, place, and time. Psychiatric:         Mood and Affect: Mood normal.         Speech: Speech normal.         Behavior: Behavior normal.         Thought Content: Thought content normal.         Cognition and Memory: Cognition normal.         Judgment: Judgment normal.          No Known Allergies  Prior to Visit Medications    Medication Sig Taking? Authorizing Provider   levothyroxine (SYNTHROID) 150 MCG tablet TAKE 1 TABLET BY MOUTH  DAILY Yes Araceli Paige MD   hydroCHLOROthiazide (HYDRODIURIL) 25 MG tablet TAKE 1 TABLET BY MOUTH  DAILY Yes Araceli Paige MD   escitalopram (LEXAPRO) 10 MG tablet TAKE 1 AND 1/2 TABLETS BY MOUTH DAILY Yes Araceli Paige MD   zolpidem (AMBIEN) 5 MG tablet Take 1 tablet by mouth as needed for Sleep for up to 14 days.  Yes Araceli Paige MD   losartan (COZAAR) 50 MG tablet TAKE 1 TABLET BY MOUTH  DAILY Yes Araceli Paige MD   simvastatin (ZOCOR) 40 MG tablet TAKE 1 TABLET BY MOUTH AT  NIGHT Yes Araceli Paige MD   Omega-3 Fatty Acids (FISH OIL) 1000 MG CAPS Take 1,000 mg by mouth daily Yes Historical Provider, MD   loratadine (CLARITIN) 10 MG capsule Take 10 mg by mouth daily Yes Historical Provider, MD   ascorbic acid (VITAMIN C) 500 MG tablet Take 4,000 mg by mouth daily Yes Historical Provider, MD   mupirocin (BACTROBAN) 2 % ointment Apply topically to affected area daily Yes Araceli Paige MD   methocarbamol (ROBAXIN) 500 MG tablet Take 750 mg by mouth 2 times daily as needed Yes Historical Provider, MD   vitamin B-12 (CYANOCOBALAMIN) 500 MCG tablet Take 500 mcg by mouth daily Yes Historical Provider, MD   Multiple Vitamins-Minerals (MULTIVITAMIN ADULT PO) Take by mouth Yes Historical Provider, MD   fluticasone (FLONASE) 50 MCG/ACT nasal spray 1 spray by Nasal route daily Yes Paulina Diaz MD   Cholecalciferol (VITAMIN D) 2000 UNITS CAPS capsule Take 1 capsule by mouth daily Yes Historical Provider, MD   Probiotic Product (PROBIOTIC ADVANCED PO) Take by mouth Yes Historical Provider, MD       CareTeam (Including outside providers/suppliers regularly involved in providing care):   Patient Care Team:  Maria Fernanda River MD as PCP - General (Internal Medicine)  Maria Fernanda River MD as PCP - Empaneled Provider  Verna Littlejohn MD as Consulting Physician (Gastroenterology)  Elier Ayala MD (Neurology)  Larisa Barron MD as Consulting Physician (Otolaryngology)  Darvin Baltazar MD as Consulting Physician (Physical Medicine and Rehab)     Reviewed and updated this visit:  Tobacco  Allergies  Meds  Problems  Med Hx  Surg Hx  Soc Hx  Fam Hx

## 2023-03-16 NOTE — ASSESSMENT & PLAN NOTE
FBG worse, but HgbA1c slightly better. Importance of lifestyle changes stressed to help prevent progression to diabetes.

## 2023-03-17 ENCOUNTER — OFFICE VISIT (OUTPATIENT)
Dept: INTERNAL MEDICINE CLINIC | Age: 73
End: 2023-03-17

## 2023-03-17 VITALS
BODY MASS INDEX: 31.34 KG/M2 | WEIGHT: 195 LBS | SYSTOLIC BLOOD PRESSURE: 106 MMHG | DIASTOLIC BLOOD PRESSURE: 64 MMHG | HEIGHT: 66 IN | HEART RATE: 80 BPM

## 2023-03-17 DIAGNOSIS — E78.00 PURE HYPERCHOLESTEROLEMIA: ICD-10-CM

## 2023-03-17 DIAGNOSIS — E03.4 HYPOTHYROIDISM DUE TO ACQUIRED ATROPHY OF THYROID: ICD-10-CM

## 2023-03-17 DIAGNOSIS — I10 PRIMARY HYPERTENSION: ICD-10-CM

## 2023-03-17 DIAGNOSIS — F51.04 PSYCHOPHYSIOLOGICAL INSOMNIA: ICD-10-CM

## 2023-03-17 DIAGNOSIS — E66.09 CLASS 1 OBESITY DUE TO EXCESS CALORIES WITH SERIOUS COMORBIDITY AND BODY MASS INDEX (BMI) OF 31.0 TO 31.9 IN ADULT: ICD-10-CM

## 2023-03-17 DIAGNOSIS — R73.9 HYPERGLYCEMIA: ICD-10-CM

## 2023-03-17 DIAGNOSIS — F43.10 POST TRAUMATIC STRESS DISORDER: ICD-10-CM

## 2023-03-17 DIAGNOSIS — Z00.00 MEDICARE ANNUAL WELLNESS VISIT, SUBSEQUENT: Primary | ICD-10-CM

## 2023-03-17 PROBLEM — G44.309 POST-TRAUMATIC HEADACHE: Status: RESOLVED | Noted: 2019-05-21 | Resolved: 2023-03-17

## 2023-03-17 RX ORDER — ZOLPIDEM TARTRATE 5 MG/1
5 TABLET ORAL PRN
Qty: 30 TABLET | Refills: 1 | Status: SHIPPED | OUTPATIENT
Start: 2023-03-17 | End: 2023-04-17

## 2023-03-17 NOTE — ASSESSMENT & PLAN NOTE
Patient was asked about her current diet and exercise habits, and personalized advice was provided regarding recommended lifestyle changes. Patient's comorbid health conditions associated with elevated BMI were discussed, as well as the likely benefits of weight loss. Based upon patient's motivation to change her behavior, the following plan was agreed upon to work toward a weight loss goal of 15 pounds: lower carbohydrate diet. Educational materials for weight loss were provided. Patient will follow-up in 6 month(s) with PCP. Provider spent 15 minutes counseling patient.

## 2023-03-17 NOTE — PATIENT INSTRUCTIONS
Advance Directives: Care Instructions  Overview  An advance directive is a legal way to state your wishes at the end of your life. It tells your family and your doctor what to do if you can't say what you want. There are two main types of advance directives. You can change them any time your wishes change. Living will. This form tells your family and your doctor your wishes about life support and other treatment. The form is also called a declaration. Medical power of . This form lets you name a person to make treatment decisions for you when you can't speak for yourself. This person is called a health care agent (health care proxy, health care surrogate). The form is also called a durable power of  for health care. If you do not have an advance directive, decisions about your medical care may be made by a family member, or by a doctor or a  who doesn't know you. It may help to think of an advance directive as a gift to the people who care for you. If you have one, they won't have to make tough decisions by themselves. For more information, including forms for your state, see the [address] website (www.caringinfo.org/planning/advance-directives/). Follow-up care is a key part of your treatment and safety. Be sure to make and go to all appointments, and call your doctor if you are having problems. It's also a good idea to know your test results and keep a list of the medicines you take. What should you include in an advance directive? Many states have a unique advance directive form. (It may ask you to address specific issues.) Or you might use a universal form that's approved by many states. If your form doesn't tell you what to address, it may be hard to know what to include in your advance directive. Use the questions below to help you get started. Who do you want to make decisions about your medical care if you are not able to?   What life-support measures do you want if you have a serious illness that gets worse over time or can't be cured? What are you most afraid of that might happen? (Maybe you're afraid of having pain, losing your independence, or being kept alive by machines.)  Where would you prefer to die? (Your home? A hospital? A nursing home?)  Do you want to donate your organs when you die? Do you want certain Orthodox practices performed before you die? When should you call for help? Be sure to contact your doctor if you have any questions. Where can you learn more? Go to http://www.costello.com/ and enter R264 to learn more about \"Advance Directives: Care Instructions. \"  Current as of: June 16, 2022               Content Version: 13.6  © 2006-2023 Healthwise, [employer]. Care instructions adapted under license by [facility] ([facility]). If you have questions about a medical condition or this instruction, always ask your healthcare professional. [place] [address] any warranty or liability for your use of this information. Starting a Weight Loss Plan: Care Instructions  Overview     If you're thinking about losing weight, it can be hard to know where to start. Your doctor can help you set up a weight loss plan that best meets your needs. You may want to take a class on nutrition or exercise, or you could join a weight loss support group. If you have questions about how to make changes to your eating or exercise habits, ask your doctor about seeing a registered dietitian or an exercise specialist.  It can be a big challenge to lose weight. But you don't have to make huge changes at once. Make small changes, and stick with them. When those changes become habit, add a few more changes. If you don't think you're ready to make changes right now, try to pick a date in the future. Make an appointment to see your doctor to discuss whether the time is right for you to start a plan. Follow-up care is a key part of your treatment and safety.  Be sure to make and go to all appointments, and call your doctor if you are having problems. It's also a good idea to know your test results and keep a list of the medicines you take. How can you care for yourself at home? Set realistic goals. Many people expect to lose much more weight than is likely. A weight loss of 5% to 10% of your body weight may be enough to improve your health. Get family and friends involved to provide support. Talk to them about why you are trying to lose weight, and ask them to help. They can help by participating in exercise and having meals with you, even if they may be eating something different. Find what works best for you. If you do not have time or do not like to cook, a program that offers meal replacement bars or shakes may be better for you. Or if you like to prepare meals, finding a plan that includes daily menus and recipes may be best.  Ask your doctor about other health professionals who can help you achieve your weight loss goals. A dietitian can help you make healthy changes in your diet. An exercise specialist or  can help you develop a safe and effective exercise program.  A counselor or psychiatrist can help you cope with issues such as depression, anxiety, or family problems that can make it hard to focus on weight loss. Consider joining a support group for people who are trying to lose weight. Your doctor can suggest groups in your area. Where can you learn more? Go to http://www.woods.com/ and enter U357 to learn more about \"Starting a Weight Loss Plan: Care Instructions. \"  Current as of: May 9, 2022               Content Version: 13.6  © 2006-2023 Healthwise, Incorporated. Care instructions adapted under license by [facility] [employer] [facility] ([facility]).  If you have questions about a medical condition or this instruction, always ask your healthcare professional. [address] any warranty or liability for your use of this information. A Healthy Heart: Care Instructions  Your Care Instructions     Coronary artery disease, also called heart disease, occurs when a substance called plaque builds up in the vessels that supply oxygen-rich blood to your heart muscle. This can narrow the blood vessels and reduce blood flow. A heart attack happens when blood flow is completely blocked. A high-fat diet, smoking, and other factors increase the risk of heart disease. Your doctor has found that you have a chance of having heart disease. You can do lots of things to keep your heart healthy. It may not be easy, but you can change your diet, exercise more, and quit smoking. These steps really work to lower your chance of heart disease. Follow-up care is a key part of your treatment and safety. Be sure to make and go to all appointments, and call your doctor if you are having problems. It's also a good idea to know your test results and keep a list of the medicines you take. How can you care for yourself at home? Diet    Use less salt when you cook and eat. This helps lower your blood pressure. Taste food before salting. Add only a little salt when you think you need it. With time, your taste buds will adjust to less salt.     Eat fewer snack items, fast foods, canned soups, and other high-salt, high-fat, processed foods.     Read food labels and try to avoid saturated and trans fats. They increase your risk of heart disease by raising cholesterol levels.     Limit the amount of solid fat-butter, margarine, and shortening-you eat. Use olive, peanut, or canola oil when you cook. Bake, broil, and steam foods instead of frying them.     Eat a variety of fruit and vegetables every day. Dark green, deep orange, red, or yellow fruits and vegetables are especially good for you. Examples include spinach, carrots, peaches, and berries.     Foods high in fiber can reduce your cholesterol and provide important vitamins and minerals.  High-fiber foods include whole-grain cereals and breads, oatmeal, beans, brown rice, citrus fruits, and apples.     Eat lean proteins. Heart-healthy proteins include seafood, lean meats and poultry, eggs, beans, peas, nuts, seeds, and soy products.     Limit drinks and foods with added sugar. These include candy, desserts, and soda pop. Lifestyle changes    If your doctor recommends it, get more exercise. Walking is a good choice. Bit by bit, increase the amount you walk every day. Try for at least 30 minutes on most days of the week. You also may want to swim, bike, or do other activities.     Do not smoke. If you need help quitting, talk to your doctor about stop-smoking programs and medicines. These can increase your chances of quitting for good. Quitting smoking may be the most important step you can take to protect your heart. It is never too late to quit.     Limit alcohol to 2 drinks a day for men and 1 drink a day for women. Too much alcohol can cause health problems.     Manage other health problems such as diabetes, high blood pressure, and high cholesterol. If you think you may have a problem with alcohol or drug use, talk to your doctor. Medicines    Take your medicines exactly as prescribed. Call your doctor if you think you are having a problem with your medicine.     If your doctor recommends aspirin, take the amount directed each day. Make sure you take aspirin and not another kind of pain reliever, such as acetaminophen (Tylenol). When should you call for help? Call 911 if you have symptoms of a heart attack. These may include:    Chest pain or pressure, or a strange feeling in the chest.     Sweating.     Shortness of breath.     Pain, pressure, or a strange feeling in the back, neck, jaw, or upper belly or in one or both shoulders or arms.     Lightheadedness or sudden weakness.     A fast or irregular heartbeat.    After you call 911, the  may tell you to chew 1 adult-strength or 2 to 4 low-dose aspirin. Wait for an ambulance. Do not try to drive yourself. Watch closely for changes in your health, and be sure to contact your doctor if you have any problems. Where can you learn more? Go to http://www.costello.com/ and enter F075 to learn more about \"A Healthy Heart: Care Instructions. \"  Current as of: September 7, 2022               Content Version: 13.6  © 2159-8477 [employer]. Care instructions adapted under license by [facility] ([facility]). If you have questions about a medical condition or this instruction, always ask your healthcare professional. [place] [address] any warranty or liability for your use of this information. Personalized Preventive Plan for Ana Richmond - 3/17/2023  Medicare offers a range of preventive health benefits. Some of the tests and screenings are paid in full while other may be subject to a deductible, co-insurance, and/or copay. Some of these benefits include a comprehensive review of your medical history including lifestyle, illnesses that may run in your family, and various assessments and screenings as appropriate. After reviewing your medical record and screening and assessments performed today your provider may have ordered immunizations, labs, imaging, and/or referrals for you. A list of these orders (if applicable) as well as your Preventive Care list are included within your After Visit Summary for your review. Other Preventive Recommendations:    A preventive eye exam performed by an eye specialist is recommended every 1-2 years to screen for glaucoma; cataracts, macular degeneration, and other eye disorders. A preventive dental visit is recommended every 6 months. Try to get at least 150 minutes of exercise per week or 10,000 steps per day on a pedometer . Order or download the FREE \"Exercise & Physical Activity: Your Everyday Guide\" from [employer].  Call 1-537.256.1525 or search [employer] online. You need 2603-2073 mg of calcium and 7446-9599 IU of vitamin D per day. It is possible to meet your calcium requirement with diet alone, but a vitamin D supplement is usually necessary to meet this goal.  When exposed to the sun, use a sunscreen that protects against both UVA and UVB radiation with an SPF of 30 or greater. Reapply every 2 to 3 hours or after sweating, drying off with a towel, or swimming. Always wear a seat belt when traveling in a car. Always wear a helmet when riding a bicycle or motorcycle.

## 2023-03-17 NOTE — ASSESSMENT & PLAN NOTE
Adequately controlled on 15 mg dose of Lexapro- continue. CBT suggested with [facility] to address residual symptoms.

## 2023-04-02 RX ORDER — SIMVASTATIN 40 MG
TABLET ORAL
Qty: 90 TABLET | Refills: 1 | Status: SHIPPED | OUTPATIENT
Start: 2023-04-02

## 2023-04-20 ENCOUNTER — E-VISIT (OUTPATIENT)
Dept: INTERNAL MEDICINE CLINIC | Age: 73
End: 2023-04-20
Payer: MEDICARE

## 2023-04-20 DIAGNOSIS — J01.90 ACUTE SINUSITIS, RECURRENCE NOT SPECIFIED, UNSPECIFIED LOCATION: ICD-10-CM

## 2023-04-20 PROCEDURE — 99421 OL DIG E/M SVC 5-10 MIN: CPT | Performed by: INTERNAL MEDICINE

## 2023-04-20 RX ORDER — AMOXICILLIN AND CLAVULANATE POTASSIUM 875; 125 MG/1; MG/1
1 TABLET, FILM COATED ORAL 2 TIMES DAILY
Qty: 20 TABLET | Refills: 0 | Status: SHIPPED | OUTPATIENT
Start: 2023-04-20 | End: 2023-04-30

## 2023-04-20 ASSESSMENT — LIFESTYLE VARIABLES: SMOKING_STATUS: NO, I'VE NEVER SMOKED

## 2023-04-20 NOTE — PROGRESS NOTES
HPI: as per patient provided history  Exam: N/A (electronic visit)  ASSESSMENT/PLAN:  1. Acute sinusitis, recurrence not specified, unspecified location  Post-viral vs triggered by spring allergies. Supportive care guidelines provided. - amoxicillin-clavulanate (AUGMENTIN) 875-125 MG per tablet; Take 1 tablet by mouth 2 times daily for 10 days Take with meals. Dispense: 20 tablet; Refill: 0      Patient instructed to call the office if worsens, or fails to improve as anticipated. 5-10 minutes were spent on the digital evaluation and management of this patient.

## 2023-04-26 RX ORDER — ESCITALOPRAM OXALATE 10 MG/1
TABLET ORAL
Qty: 135 TABLET | Refills: 1 | Status: SHIPPED | OUTPATIENT
Start: 2023-04-26

## 2023-08-28 ENCOUNTER — E-VISIT (OUTPATIENT)
Dept: INTERNAL MEDICINE CLINIC | Age: 73
End: 2023-08-28
Payer: MEDICARE

## 2023-08-28 DIAGNOSIS — J01.90 ACUTE NON-RECURRENT SINUSITIS, UNSPECIFIED LOCATION: Primary | ICD-10-CM

## 2023-08-28 PROCEDURE — 99421 OL DIG E/M SVC 5-10 MIN: CPT | Performed by: INTERNAL MEDICINE

## 2023-08-28 RX ORDER — FLUCONAZOLE 150 MG/1
150 TABLET ORAL ONCE
Qty: 2 TABLET | Refills: 0 | Status: SHIPPED | OUTPATIENT
Start: 2023-08-28 | End: 2023-08-28

## 2023-08-28 RX ORDER — AMOXICILLIN AND CLAVULANATE POTASSIUM 875; 125 MG/1; MG/1
1 TABLET, FILM COATED ORAL 2 TIMES DAILY
Qty: 20 TABLET | Refills: 0 | Status: SHIPPED | OUTPATIENT
Start: 2023-08-28 | End: 2023-09-07

## 2023-08-28 ASSESSMENT — LIFESTYLE VARIABLES: SMOKING_STATUS: NO, I'VE NEVER SMOKED

## 2023-08-28 NOTE — PROGRESS NOTES
HPI: as per patient provided history  Exam: N/A (electronic visit)  ASSESSMENT/PLAN:  1. Acute non-recurrent sinusitis, unspecified location  Likely post-viral or related to [address]. Supportive care guidelines provided. - amoxicillin-clavulanate (AUGMENTIN) 875-125 MG per tablet; Take 1 tablet by mouth 2 times daily for 10 days  Dispense: 20 tablet; Refill: 0  - fluconazole (DIFLUCAN) 150 MG tablet; Take 1 tablet by mouth once for 1 dose May repeat in 5 days, if symptoms persist or recur. Dispense: 2 tablet; Refill: 0      Patient instructed to call the office if worsens, or fails to improve as anticipated. 5-10 minutes were spent on the digital evaluation and management of this patient.

## 2023-10-11 ENCOUNTER — HOSPITAL ENCOUNTER (OUTPATIENT)
Age: 73
Discharge: HOME | End: 2023-10-11
Payer: MEDICARE

## 2023-10-11 DIAGNOSIS — E03.4 HYPOTHYROIDISM DUE TO ACQUIRED ATROPHY OF THYROID: ICD-10-CM

## 2023-10-11 DIAGNOSIS — I10 PRIMARY HYPERTENSION: ICD-10-CM

## 2023-10-11 DIAGNOSIS — E78.00 PURE HYPERCHOLESTEROLEMIA: ICD-10-CM

## 2023-10-11 DIAGNOSIS — R73.9 HYPERGLYCEMIA: ICD-10-CM

## 2023-10-11 LAB
ALBUMIN SERPL-MCNC: 4.2 G/DL (ref 3.4–5)
ALBUMIN/GLOB SERPL: 1.4 {RATIO} (ref 1.1–2.2)
ALP SERPL-CCNC: 86 U/L (ref 40–129)
ALT SERPL-CCNC: 15 U/L (ref 10–40)
ANION GAP SERPL CALCULATED.3IONS-SCNC: 10 MMOL/L (ref 3–16)
AST SERPL-CCNC: 23 U/L (ref 15–37)
BILIRUB SERPL-MCNC: 1.2 MG/DL (ref 0–1)
BUN SERPL-MCNC: 19 MG/DL (ref 7–20)
CALCIUM SERPL-MCNC: 9.6 MG/DL (ref 8.3–10.6)
CHLORIDE SERPL-SCNC: 100 MMOL/L (ref 99–110)
CHOLEST SERPL-MCNC: 146 MG/DL (ref 0–199)
CO2 SERPL-SCNC: 30 MMOL/L (ref 21–32)
CREAT SERPL-MCNC: 1 MG/DL (ref 0.6–1.2)
GFR SERPLBLD CREATININE-BSD FMLA CKD-EPI: 60 ML/MIN/{1.73_M2}
GLUCOSE P FAST SERPL-MCNC: 107 MG/DL (ref 70–99)
HDLC SERPL-MCNC: 44 MG/DL (ref 40–60)
LDL CHOLESTEROL CALCULATED: 86 MG/DL
POTASSIUM SERPL-SCNC: 3.5 MMOL/L (ref 3.5–5.1)
PROT SERPL-MCNC: 7.1 G/DL (ref 6.4–8.2)
SODIUM SERPL-SCNC: 140 MMOL/L (ref 136–145)
TRIGL SERPL-MCNC: 82 MG/DL (ref 0–150)
TSH SERPL DL<=0.005 MIU/L-ACNC: 0.47 UIU/ML (ref 0.27–4.2)
VLDLC SERPL CALC-MCNC: 16 MG/DL

## 2023-10-11 PROCEDURE — 80061 LIPID PANEL: CPT

## 2023-10-11 PROCEDURE — 80053 COMPREHEN METABOLIC PANEL: CPT

## 2023-10-11 PROCEDURE — 83036 HEMOGLOBIN GLYCOSYLATED A1C: CPT

## 2023-10-11 PROCEDURE — 84443 ASSAY THYROID STIM HORMONE: CPT

## 2023-10-11 PROCEDURE — 36415 COLL VENOUS BLD VENIPUNCTURE: CPT

## 2023-10-12 LAB
EST. AVERAGE GLUCOSE BLD GHB EST-MCNC: 125.5 MG/DL
HBA1C MFR BLD: 6 %

## 2023-10-13 ENCOUNTER — OFFICE VISIT (OUTPATIENT)
Dept: INTERNAL MEDICINE CLINIC | Age: 73
End: 2023-10-13

## 2023-10-13 VITALS
DIASTOLIC BLOOD PRESSURE: 72 MMHG | HEIGHT: 66 IN | BODY MASS INDEX: 30.05 KG/M2 | SYSTOLIC BLOOD PRESSURE: 118 MMHG | WEIGHT: 187 LBS | RESPIRATION RATE: 14 BRPM

## 2023-10-13 DIAGNOSIS — F43.10 POST TRAUMATIC STRESS DISORDER: ICD-10-CM

## 2023-10-13 DIAGNOSIS — E66.09 CLASS 1 OBESITY DUE TO EXCESS CALORIES WITH SERIOUS COMORBIDITY AND BODY MASS INDEX (BMI) OF 30.0 TO 30.9 IN ADULT: ICD-10-CM

## 2023-10-13 DIAGNOSIS — I10 PRIMARY HYPERTENSION: Primary | ICD-10-CM

## 2023-10-13 DIAGNOSIS — E04.1 THYROID NODULE: ICD-10-CM

## 2023-10-13 DIAGNOSIS — E03.4 HYPOTHYROIDISM DUE TO ACQUIRED ATROPHY OF THYROID: ICD-10-CM

## 2023-10-13 DIAGNOSIS — F41.9 ANXIETY: ICD-10-CM

## 2023-10-13 DIAGNOSIS — R73.9 HYPERGLYCEMIA: ICD-10-CM

## 2023-10-13 DIAGNOSIS — E78.00 PURE HYPERCHOLESTEROLEMIA: ICD-10-CM

## 2023-10-13 DIAGNOSIS — F51.04 PSYCHOPHYSIOLOGICAL INSOMNIA: ICD-10-CM

## 2023-10-13 NOTE — ASSESSMENT & PLAN NOTE
Improved. Patient was asked about her current diet and exercise habits, and personalized advice was provided regarding recommended lifestyle changes. Patient's comorbid health conditions associated with elevated BMI were discussed, as well as the likely benefits of weight loss. Based upon patient's motivation to change her behavior, the following plan was agreed upon to work toward a weight loss goal of 15-29 pounds: lower carbohydrate diet. Educational materials for weight loss were provided. Patient will follow-up in 6 month(s) with PCP. Provider spent 15 minutes counseling patient.

## 2023-10-13 NOTE — ASSESSMENT & PLAN NOTE
Slow improvement with driving anxiety, but recent setback due to death of sister. Continue current dose of Lexapro. Grief counseling suggested.

## 2023-10-13 NOTE — ASSESSMENT & PLAN NOTE
Asymptomatic. FBG improved, but HgbA1c slightly higher. Discussed importance of continued lifestyle changes to help prevent progression to diabetes.

## 2023-10-16 RX ORDER — HYDROCHLOROTHIAZIDE 25 MG/1
TABLET ORAL
Qty: 90 TABLET | Refills: 0 | Status: SHIPPED | OUTPATIENT
Start: 2023-10-16

## 2023-10-16 RX ORDER — LEVOTHYROXINE SODIUM 0.15 MG/1
150 TABLET ORAL DAILY
Qty: 90 TABLET | Refills: 0 | Status: SHIPPED | OUTPATIENT
Start: 2023-10-16

## 2023-10-16 RX ORDER — SIMVASTATIN 40 MG
TABLET ORAL
Qty: 90 TABLET | Refills: 0 | Status: SHIPPED | OUTPATIENT
Start: 2023-10-16

## 2023-10-17 RX ORDER — LOSARTAN POTASSIUM 50 MG/1
50 TABLET ORAL DAILY
Qty: 90 TABLET | Refills: 0 | Status: SHIPPED | OUTPATIENT
Start: 2023-10-17

## 2023-10-19 RX ORDER — ESCITALOPRAM OXALATE 10 MG/1
TABLET ORAL
Qty: 135 TABLET | Refills: 0 | Status: SHIPPED | OUTPATIENT
Start: 2023-10-19

## 2023-10-19 NOTE — TELEPHONE ENCOUNTER
Last appointment: 10/13/2023  Next appointment: 4/16/2024  Last refill: 4/26/23  Please advise as DOD.  Thank you

## 2023-10-20 ENCOUNTER — HOSPITAL ENCOUNTER (OUTPATIENT)
Dept: ULTRASOUND IMAGING | Age: 73
Discharge: HOME | End: 2023-10-20
Attending: INTERNAL MEDICINE
Payer: MEDICARE

## 2023-10-20 DIAGNOSIS — E04.1 THYROID NODULE: ICD-10-CM

## 2023-10-20 PROCEDURE — 76536 US EXAM OF HEAD AND NECK: CPT

## 2023-10-30 ENCOUNTER — TELEPHONE (OUTPATIENT)
Dept: INTERNAL MEDICINE CLINIC | Age: 73
End: 2023-10-30

## 2023-10-30 NOTE — TELEPHONE ENCOUNTER
Please call patient to convey content of unviewed Result Note containing interpretation of recent lab results.

## 2023-10-31 NOTE — TELEPHONE ENCOUNTER
Just clarifying are you referring to the labs from 10/11/23? She had an appointment on 10/13/23 to discuss labs I believe. Did she have new ones on 10/13? Those labs still say active-future.

## 2023-11-14 DIAGNOSIS — F51.04 PSYCHOPHYSIOLOGICAL INSOMNIA: ICD-10-CM

## 2023-11-15 RX ORDER — ZOLPIDEM TARTRATE 5 MG/1
5 TABLET ORAL PRN
Qty: 30 TABLET | Refills: 2 | Status: SHIPPED | OUTPATIENT
Start: 2023-11-15 | End: 2024-02-13

## 2024-01-03 RX ORDER — LOSARTAN POTASSIUM 50 MG/1
50 TABLET ORAL DAILY
Qty: 90 TABLET | Refills: 1 | Status: SHIPPED | OUTPATIENT
Start: 2024-01-03

## 2024-01-15 RX ORDER — ESCITALOPRAM OXALATE 10 MG/1
TABLET ORAL
Qty: 135 TABLET | Refills: 1 | Status: SHIPPED | OUTPATIENT
Start: 2024-01-15

## 2024-01-15 NOTE — TELEPHONE ENCOUNTER
Medication:   Requested Prescriptions     Pending Prescriptions Disp Refills    escitalopram (LEXAPRO) 10 MG tablet [Pharmacy Med Name: ESCITALOPRAM 10MG TABLETS] 135 tablet 0     Sig: TAKE 1 AND 1/2 TABLETS BY MOUTH DAILY     Last Filled:  10/19/23    Last appt: 10/13/2023   Next appt: 4/16/2024    Last OARRS:       8/18/2020     7:55 AM   RX Monitoring   Periodic Controlled Substance Monitoring Possible medication side effects, risk of tolerance/dependence & alternative treatments discussed.;No signs of potential drug abuse or diversion identified.;Assessed functional status.;Obtaining appropriate analgesic effect of treatment.   Chronic Pain > 80 MEDD Obtained or confirmed \"Medication Contract\" on file.

## 2024-01-22 RX ORDER — SCOLOPAMINE TRANSDERMAL SYSTEM 1 MG/1
1 PATCH, EXTENDED RELEASE TRANSDERMAL
Qty: 4 PATCH | Refills: 0 | Status: SHIPPED | OUTPATIENT
Start: 2024-01-22

## 2024-01-22 NOTE — TELEPHONE ENCOUNTER
Please ask patient how long she will be traveling so that I can order the correct number of patches.

## 2024-02-27 ENCOUNTER — E-VISIT (OUTPATIENT)
Dept: INTERNAL MEDICINE CLINIC | Age: 74
End: 2024-02-27
Payer: MEDICARE

## 2024-02-27 DIAGNOSIS — J01.90 ACUTE NON-RECURRENT SINUSITIS, UNSPECIFIED LOCATION: Primary | ICD-10-CM

## 2024-02-27 PROCEDURE — 99421 OL DIG E/M SVC 5-10 MIN: CPT | Performed by: INTERNAL MEDICINE

## 2024-02-27 RX ORDER — FLUCONAZOLE 150 MG/1
150 TABLET ORAL ONCE
Qty: 2 TABLET | Refills: 0 | Status: SHIPPED | OUTPATIENT
Start: 2024-02-27 | End: 2024-02-27

## 2024-02-27 RX ORDER — AMOXICILLIN AND CLAVULANATE POTASSIUM 875; 125 MG/1; MG/1
1 TABLET, FILM COATED ORAL 2 TIMES DAILY
Qty: 20 TABLET | Refills: 0 | Status: SHIPPED | OUTPATIENT
Start: 2024-02-27 | End: 2024-03-08

## 2024-02-27 ASSESSMENT — LIFESTYLE VARIABLES: SMOKING_STATUS: NO, I'VE NEVER SMOKED

## 2024-02-27 NOTE — PROGRESS NOTES
HPI: as per patient provided history  Exam: N/A (electronic visit)  ASSESSMENT/PLAN:  1. Acute non-recurrent sinusitis, unspecified location  Likely post-viral. Supportive care guidelines provided. Patient will call if no improvement within 3-4 days or sooner if symptoms worsen.   - amoxicillin-clavulanate (AUGMENTIN) 875-125 MG per tablet; Take 1 tablet by mouth 2 times daily for 10 days  Dispense: 20 tablet; Refill: 0  - fluconazole (DIFLUCAN) 150 MG tablet; Take 1 tablet by mouth once for 1 dose May repeat in 5 days, if symptoms persist or recur.  Dispense: 2 tablet; Refill: 0      Patient instructed to call the office if worsens, or fails to improve as anticipated.    5-10 minutes were spent on the digital evaluation and management of this patient.

## 2024-04-10 NOTE — PROGRESS NOTES
Date of Visit:  2024    CC: Di Boateng (: 1950) is a 73 y.o. female, established patient, here for evaluation/re-evaluation of the following medical concerns:    ASSESSMENT/PLAN:  Primary hypertension  Assessment & Plan:  Well-controlled.  Continue current antihypertensive regimen.    Hypokalemia  Assessment & Plan:  Recent level slightly low. She would prefer to manage with diet rather than supplement. List of high potassium foods provided. Recheck level in 2 weeks.   Orders:  -     Potassium; Future  Pure hypercholesterolemia  Assessment & Plan:  Recent lipid panel at goal. Continue current dose of simvastatin.   Hyperglycemia  Assessment & Plan:  Asymptomatic. FBG improved, but HgbA1c unchanged. Discussed importance of continued lifestyle changes to help prevent progression to diabetes.    Hypothyroidism due to acquired atrophy of thyroid  Assessment & Plan:  Clinically and biochemically euthyroid. Continue current dose of Synthroid.   Anxiety  Assessment & Plan:  Slow improvement with residual driving anxiety. Grief symptoms improved, as well. Continue current dose of Lexapro.   Post traumatic stress disorder  Assessment & Plan:  See plan for anxiety.  Psychophysiological insomnia  Class 1 obesity due to excess calories with serious comorbidity and body mass index (BMI) of 30.0 to 30.9 in adult  Assessment & Plan:  Worse due to comfort eating. Patient was asked about her current diet and exercise habits, and personalized advice was provided regarding recommended lifestyle changes. Patient's comorbid health conditions associated with elevated BMI were discussed, as well as the likely benefits of weight loss. Based upon patient's motivation to change her behavior, the following plan was agreed upon to work toward a weight loss goal of 25-30 pounds: lower carbohydrate diet, continue walking program. Educational materials for weight loss were provided.  Patient will follow-up in 6 month(s) with

## 2024-04-12 ENCOUNTER — HOSPITAL ENCOUNTER (OUTPATIENT)
Age: 74
Discharge: HOME | End: 2024-04-12
Payer: MEDICARE

## 2024-04-12 DIAGNOSIS — E78.00 PURE HYPERCHOLESTEROLEMIA: ICD-10-CM

## 2024-04-12 DIAGNOSIS — I10 PRIMARY HYPERTENSION: ICD-10-CM

## 2024-04-12 DIAGNOSIS — E03.4 HYPOTHYROIDISM DUE TO ACQUIRED ATROPHY OF THYROID: ICD-10-CM

## 2024-04-12 DIAGNOSIS — R73.9 HYPERGLYCEMIA: ICD-10-CM

## 2024-04-12 LAB
ALBUMIN SERPL-MCNC: 4.1 G/DL (ref 3.4–5)
ALBUMIN/GLOB SERPL: 1.6 {RATIO} (ref 1.1–2.2)
ALP SERPL-CCNC: 73 U/L (ref 40–129)
ALT SERPL-CCNC: 15 U/L (ref 10–40)
ANION GAP SERPL CALCULATED.3IONS-SCNC: 10 MMOL/L (ref 3–16)
AST SERPL-CCNC: 24 U/L (ref 15–37)
BILIRUB SERPL-MCNC: 1 MG/DL (ref 0–1)
BUN SERPL-MCNC: 16 MG/DL (ref 7–20)
CALCIUM SERPL-MCNC: 8.9 MG/DL (ref 8.3–10.6)
CHLORIDE SERPL-SCNC: 103 MMOL/L (ref 99–110)
CHOLEST SERPL-MCNC: 144 MG/DL (ref 0–199)
CO2 SERPL-SCNC: 27 MMOL/L (ref 21–32)
CREAT SERPL-MCNC: 0.6 MG/DL (ref 0.6–1.2)
GFR SERPLBLD CREATININE-BSD FMLA CKD-EPI: >90 ML/MIN/{1.73_M2}
GLUCOSE P FAST SERPL-MCNC: 101 MG/DL (ref 70–99)
HDLC SERPL-MCNC: 41 MG/DL (ref 40–60)
LDL CHOLESTEROL CALCULATED: 85 MG/DL
POTASSIUM SERPL-SCNC: 3.4 MMOL/L (ref 3.5–5.1)
PROT SERPL-MCNC: 6.7 G/DL (ref 6.4–8.2)
SODIUM SERPL-SCNC: 140 MMOL/L (ref 136–145)
TRIGL SERPL-MCNC: 90 MG/DL (ref 0–150)
TSH SERPL DL<=0.005 MIU/L-ACNC: 0.81 UIU/ML (ref 0.27–4.2)
VLDLC SERPL CALC-MCNC: 18 MG/DL

## 2024-04-12 PROCEDURE — 83036 HEMOGLOBIN GLYCOSYLATED A1C: CPT

## 2024-04-12 PROCEDURE — 80053 COMPREHEN METABOLIC PANEL: CPT

## 2024-04-12 PROCEDURE — 80061 LIPID PANEL: CPT

## 2024-04-12 PROCEDURE — 36415 COLL VENOUS BLD VENIPUNCTURE: CPT

## 2024-04-12 PROCEDURE — 84443 ASSAY THYROID STIM HORMONE: CPT

## 2024-04-13 LAB
EST. AVERAGE GLUCOSE BLD GHB EST-MCNC: 125.5 MG/DL
HBA1C MFR BLD: 6 %

## 2024-04-13 SDOH — ECONOMIC STABILITY: FOOD INSECURITY: WITHIN THE PAST 12 MONTHS, YOU WORRIED THAT YOUR FOOD WOULD RUN OUT BEFORE YOU GOT MONEY TO BUY MORE.: NEVER TRUE

## 2024-04-13 SDOH — ECONOMIC STABILITY: FOOD INSECURITY: WITHIN THE PAST 12 MONTHS, THE FOOD YOU BOUGHT JUST DIDN'T LAST AND YOU DIDN'T HAVE MONEY TO GET MORE.: NEVER TRUE

## 2024-04-13 SDOH — ECONOMIC STABILITY: INCOME INSECURITY: HOW HARD IS IT FOR YOU TO PAY FOR THE VERY BASICS LIKE FOOD, HOUSING, MEDICAL CARE, AND HEATING?: NOT HARD AT ALL

## 2024-04-13 ASSESSMENT — PATIENT HEALTH QUESTIONNAIRE - PHQ9
2. FEELING DOWN, DEPRESSED OR HOPELESS: NOT AT ALL
SUM OF ALL RESPONSES TO PHQ9 QUESTIONS 1 & 2: 0
1. LITTLE INTEREST OR PLEASURE IN DOING THINGS: NOT AT ALL
SUM OF ALL RESPONSES TO PHQ QUESTIONS 1-9: 0
SUM OF ALL RESPONSES TO PHQ QUESTIONS 1-9: 0
1. LITTLE INTEREST OR PLEASURE IN DOING THINGS: NOT AT ALL
SUM OF ALL RESPONSES TO PHQ QUESTIONS 1-9: 0
SUM OF ALL RESPONSES TO PHQ QUESTIONS 1-9: 0
2. FEELING DOWN, DEPRESSED OR HOPELESS: NOT AT ALL
SUM OF ALL RESPONSES TO PHQ9 QUESTIONS 1 & 2: 0

## 2024-04-16 ENCOUNTER — OFFICE VISIT (OUTPATIENT)
Dept: INTERNAL MEDICINE CLINIC | Age: 74
End: 2024-04-16

## 2024-04-16 VITALS
WEIGHT: 190.6 LBS | OXYGEN SATURATION: 98 % | SYSTOLIC BLOOD PRESSURE: 120 MMHG | BODY MASS INDEX: 30.76 KG/M2 | HEART RATE: 74 BPM | DIASTOLIC BLOOD PRESSURE: 88 MMHG

## 2024-04-16 DIAGNOSIS — F41.9 ANXIETY: ICD-10-CM

## 2024-04-16 DIAGNOSIS — E87.6 HYPOKALEMIA: ICD-10-CM

## 2024-04-16 DIAGNOSIS — F51.04 PSYCHOPHYSIOLOGICAL INSOMNIA: ICD-10-CM

## 2024-04-16 DIAGNOSIS — R73.9 HYPERGLYCEMIA: ICD-10-CM

## 2024-04-16 DIAGNOSIS — E66.09 CLASS 1 OBESITY DUE TO EXCESS CALORIES WITH SERIOUS COMORBIDITY AND BODY MASS INDEX (BMI) OF 30.0 TO 30.9 IN ADULT: ICD-10-CM

## 2024-04-16 DIAGNOSIS — F43.10 POST TRAUMATIC STRESS DISORDER: ICD-10-CM

## 2024-04-16 DIAGNOSIS — E78.00 PURE HYPERCHOLESTEROLEMIA: ICD-10-CM

## 2024-04-16 DIAGNOSIS — I10 PRIMARY HYPERTENSION: Primary | ICD-10-CM

## 2024-04-16 DIAGNOSIS — E03.4 HYPOTHYROIDISM DUE TO ACQUIRED ATROPHY OF THYROID: ICD-10-CM

## 2024-04-16 RX ORDER — DIPHENHYDRAMINE HCL 12.5MG/5ML
25 LIQUID (ML) ORAL DAILY
COMMUNITY

## 2024-04-16 SDOH — ECONOMIC STABILITY: INCOME INSECURITY: HOW HARD IS IT FOR YOU TO PAY FOR THE VERY BASICS LIKE FOOD, HOUSING, MEDICAL CARE, AND HEATING?: NOT HARD AT ALL

## 2024-04-16 SDOH — ECONOMIC STABILITY: FOOD INSECURITY: WITHIN THE PAST 12 MONTHS, THE FOOD YOU BOUGHT JUST DIDN'T LAST AND YOU DIDN'T HAVE MONEY TO GET MORE.: NEVER TRUE

## 2024-04-16 SDOH — ECONOMIC STABILITY: FOOD INSECURITY: WITHIN THE PAST 12 MONTHS, YOU WORRIED THAT YOUR FOOD WOULD RUN OUT BEFORE YOU GOT MONEY TO BUY MORE.: NEVER TRUE

## 2024-04-16 NOTE — ASSESSMENT & PLAN NOTE
Worse due to comfort eating. Patient was asked about her current diet and exercise habits, and personalized advice was provided regarding recommended lifestyle changes. Patient's comorbid health conditions associated with elevated BMI were discussed, as well as the likely benefits of weight loss. Based upon patient's motivation to change her behavior, the following plan was agreed upon to work toward a weight loss goal of 25-30 pounds: lower carbohydrate diet, continue walking program. Educational materials for weight loss were provided.  Patient will follow-up in 6 month(s) with PCP. Provider spent 15 minutes counseling patient.

## 2024-04-16 NOTE — ASSESSMENT & PLAN NOTE
Slow improvement with residual driving anxiety. Grief symptoms improved, as well. Continue current dose of Lexapro.

## 2024-04-16 NOTE — ASSESSMENT & PLAN NOTE
Asymptomatic. FBG improved, but HgbA1c unchanged. Discussed importance of continued lifestyle changes to help prevent progression to diabetes.

## 2024-04-16 NOTE — ASSESSMENT & PLAN NOTE
Recent level slightly low. She would prefer to manage with diet rather than supplement. List of high potassium foods provided. Recheck level in 2 weeks.

## 2024-04-27 NOTE — TELEPHONE ENCOUNTER
Medication:   Requested Prescriptions     Pending Prescriptions Disp Refills    levothyroxine (SYNTHROID) 150 MCG tablet [Pharmacy Med Name: Levothyroxine Sodium 150 MCG Oral Tablet] 90 tablet 3     Sig: TAKE 1 TABLET BY MOUTH DAILY    hydroCHLOROthiazide (HYDRODIURIL) 25 MG tablet [Pharmacy Med Name: hydroCHLOROthiazide 25 MG Oral Tablet] 90 tablet 3     Sig: TAKE 1 TABLET BY MOUTH DAILY    simvastatin (ZOCOR) 40 MG tablet [Pharmacy Med Name: Simvastatin 40 MG Oral Tablet] 90 tablet 3     Sig: TAKE 1 TABLET BY MOUTH AT NIGHT       Last Filled:  10/16/2023 #[id number]    Patient Phone Number: [phone] (home)     Last appt: 4/16/2024   Next appt: Visit date not found    Last Lipid:   Lab Results   Component Value Date/Time    CHOL 238 11/11/2016 11:16 AM    TRIG 106 11/11/2016 11:16 AM    HDL 41 04/12/2024 07:24 AM    LDLCALC 85 04/12/2024 07:24 AM       Lab Results   Component Value Date    TSH 0.81 04/12/2024    TSHREFLEX 0.37 06/04/2019

## 2024-04-28 RX ORDER — LEVOTHYROXINE SODIUM 0.15 MG/1
150 TABLET ORAL DAILY
Qty: 90 TABLET | Refills: 1 | Status: SHIPPED | OUTPATIENT
Start: 2024-04-28

## 2024-04-28 RX ORDER — HYDROCHLOROTHIAZIDE 25 MG/1
TABLET ORAL
Qty: 90 TABLET | Refills: 1 | Status: SHIPPED | OUTPATIENT
Start: 2024-04-28

## 2024-04-28 RX ORDER — SIMVASTATIN 40 MG
TABLET ORAL
Qty: 90 TABLET | Refills: 1 | Status: SHIPPED | OUTPATIENT
Start: 2024-04-28

## 2024-04-30 ENCOUNTER — E-VISIT (OUTPATIENT)
Dept: INTERNAL MEDICINE CLINIC | Age: 74
End: 2024-04-30
Payer: MEDICARE

## 2024-04-30 DIAGNOSIS — J01.90 ACUTE NON-RECURRENT SINUSITIS, UNSPECIFIED LOCATION: Primary | ICD-10-CM

## 2024-04-30 PROCEDURE — 99421 OL DIG E/M SVC 5-10 MIN: CPT | Performed by: INTERNAL MEDICINE

## 2024-04-30 ASSESSMENT — LIFESTYLE VARIABLES: SMOKING_STATUS: NO, I'VE NEVER SMOKED

## 2024-05-01 RX ORDER — AMOXICILLIN AND CLAVULANATE POTASSIUM 875; 125 MG/1; MG/1
1 TABLET, FILM COATED ORAL 2 TIMES DAILY
Qty: 20 TABLET | Refills: 0 | Status: SHIPPED | OUTPATIENT
Start: 2024-05-01 | End: 2024-05-11

## 2024-05-01 RX ORDER — FLUCONAZOLE 150 MG/1
150 TABLET ORAL ONCE
Qty: 2 TABLET | Refills: 0 | Status: SHIPPED | OUTPATIENT
Start: 2024-05-01 | End: 2024-05-01

## 2024-06-02 SDOH — HEALTH STABILITY: PHYSICAL HEALTH: ON AVERAGE, HOW MANY DAYS PER WEEK DO YOU ENGAGE IN MODERATE TO STRENUOUS EXERCISE (LIKE A BRISK WALK)?: 4 DAYS

## 2024-06-05 ENCOUNTER — OFFICE VISIT (OUTPATIENT)
Dept: INTERNAL MEDICINE CLINIC | Age: 74
End: 2024-06-05

## 2024-06-05 VITALS
HEIGHT: 66 IN | DIASTOLIC BLOOD PRESSURE: 80 MMHG | BODY MASS INDEX: 30.53 KG/M2 | HEART RATE: 78 BPM | TEMPERATURE: 97 F | WEIGHT: 190 LBS | SYSTOLIC BLOOD PRESSURE: 122 MMHG | OXYGEN SATURATION: 100 %

## 2024-06-05 DIAGNOSIS — E03.4 HYPOTHYROIDISM DUE TO ACQUIRED ATROPHY OF THYROID: ICD-10-CM

## 2024-06-05 DIAGNOSIS — F41.9 ANXIETY: ICD-10-CM

## 2024-06-05 DIAGNOSIS — R73.9 HYPERGLYCEMIA: ICD-10-CM

## 2024-06-05 DIAGNOSIS — E66.09 CLASS 1 OBESITY DUE TO EXCESS CALORIES WITH SERIOUS COMORBIDITY AND BODY MASS INDEX (BMI) OF 30.0 TO 30.9 IN ADULT: ICD-10-CM

## 2024-06-05 DIAGNOSIS — F51.04 PSYCHOPHYSIOLOGICAL INSOMNIA: Primary | ICD-10-CM

## 2024-06-05 RX ORDER — ZOLPIDEM TARTRATE 5 MG/1
5 TABLET ORAL PRN
Qty: 30 TABLET | Refills: 2 | Status: SHIPPED | OUTPATIENT
Start: 2024-06-05 | End: 2024-09-03

## 2024-06-05 NOTE — PROGRESS NOTES
Fasting; Future  -     TSH with Reflex; Future  -     Comprehensive Metabolic Panel; Future  -     CBC with Auto Differential; Future      Return in about 6 months (around 12/5/2024) for Annual Wellness Visit.         Subjective   SUBJECTIVE/OBJECTIVE:  AMY Sevilla is a 73 year old female who presented today to establish care.   She used to see Dr. Leyva.   She recently lost her 96 year old mother in May.   Overall she is doing well   Needed refill for Ambien for sleep    Review of Systems:   Constitutional:  Denies fever or chills   Eyes:  Denies change in visual acuity   HENT:  Denies nasal congestion or sore throat   Respiratory:  Denies cough or shortness of breath   Cardiovascular:  Denies chest pain or edema   GI:  Denies abdominal pain, nausea, vomiting, bloody stools or diarrhea   :  Denies dysuria   Musculoskeletal:  Denies back pain or joint pain   Integument:  Denies rash   Neurologic:  Denies headache, focal weakness or sensory changes   Endocrine:  Denies polyuria or polydipsia   Lymphatic:  Denies swollen glands   Psychiatric:  Denies depression or anxiety        Current Outpatient Medications   Medication Sig Dispense Refill    zolpidem (AMBIEN) 5 MG tablet Take 1 tablet by mouth as needed for Sleep for up to 90 days. 30 tablet 2    levothyroxine (SYNTHROID) 150 MCG tablet TAKE 1 TABLET BY MOUTH DAILY 90 tablet 1    hydroCHLOROthiazide (HYDRODIURIL) 25 MG tablet TAKE 1 TABLET BY MOUTH DAILY 90 tablet 1    simvastatin (ZOCOR) 40 MG tablet TAKE 1 TABLET BY MOUTH AT NIGHT 90 tablet 1    diphenhydrAMINE (BENADRYL) 12.5 MG/5ML elixir Take 10 mLs by mouth daily      escitalopram (LEXAPRO) 10 MG tablet TAKE 1 AND 1/2 TABLETS BY MOUTH DAILY 135 tablet 1    losartan (COZAAR) 50 MG tablet TAKE 1 TABLET BY MOUTH DAILY 90 tablet 1    Omega-3 Fatty Acids (FISH OIL) 1000 MG CAPS Take 1 capsule by mouth daily      loratadine (CLARITIN) 10 MG capsule Take 1 capsule by mouth daily      ascorbic acid (VITAMIN C)
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
## 2024-07-08 ENCOUNTER — PATIENT MESSAGE (OUTPATIENT)
Dept: INTERNAL MEDICINE CLINIC | Age: 74
End: 2024-07-08

## 2024-07-10 RX ORDER — ESCITALOPRAM OXALATE 10 MG/1
15 TABLET ORAL DAILY
Qty: 135 TABLET | Refills: 1 | Status: SHIPPED | OUTPATIENT
Start: 2024-07-10

## 2024-07-10 NOTE — TELEPHONE ENCOUNTER
From: Di Boateng  To: Dr. Mayda Jacobo  Sent: 7/8/2024 9:41 PM EDT  Subject: Prescription Refill     Hi Dr. Jacobo ,    Hope all is well with you and yours! I really enjoyed our first meet and greet very engaging and informative.    [facility] sent a message advising me that I have 0 refills for ESC (escitalopram 10 MG).    I very much appreciate your assistance and, I thank you in advance!    Respectfully ,    Di Boateng    [facility]. [address] [place] [address]. [place] [address] ([phone])

## 2024-09-16 ENCOUNTER — PATIENT MESSAGE (OUTPATIENT)
Dept: INTERNAL MEDICINE CLINIC | Age: 74
End: 2024-09-16

## 2024-09-16 RX ORDER — SCOLOPAMINE TRANSDERMAL SYSTEM 1 MG/1
1 PATCH, EXTENDED RELEASE TRANSDERMAL
Qty: 3 PATCH | Refills: 0 | Status: SHIPPED | OUTPATIENT
Start: 2024-09-16

## 2024-09-24 ENCOUNTER — TELEPHONE (OUTPATIENT)
Dept: INTERNAL MEDICINE CLINIC | Age: 74
End: 2024-09-24

## 2024-09-30 ENCOUNTER — OFFICE VISIT (OUTPATIENT)
Dept: FAMILY MEDICINE CLINIC | Age: 74
End: 2024-09-30
Payer: MEDICARE

## 2024-09-30 VITALS
TEMPERATURE: 96.9 F | HEIGHT: 66 IN | WEIGHT: 190 LBS | BODY MASS INDEX: 30.53 KG/M2 | SYSTOLIC BLOOD PRESSURE: 122 MMHG | DIASTOLIC BLOOD PRESSURE: 80 MMHG

## 2024-09-30 DIAGNOSIS — B37.31 VAGINAL CANDIDA: ICD-10-CM

## 2024-09-30 DIAGNOSIS — B96.89 ACUTE BACTERIAL SINUSITIS: Primary | ICD-10-CM

## 2024-09-30 DIAGNOSIS — J01.90 ACUTE BACTERIAL SINUSITIS: Primary | ICD-10-CM

## 2024-09-30 PROCEDURE — 3074F SYST BP LT 130 MM HG: CPT | Performed by: NURSE PRACTITIONER

## 2024-09-30 PROCEDURE — G8417 CALC BMI ABV UP PARAM F/U: HCPCS | Performed by: NURSE PRACTITIONER

## 2024-09-30 PROCEDURE — 99214 OFFICE O/P EST MOD 30 MIN: CPT | Performed by: NURSE PRACTITIONER

## 2024-09-30 PROCEDURE — 1036F TOBACCO NON-USER: CPT | Performed by: NURSE PRACTITIONER

## 2024-09-30 PROCEDURE — 1090F PRES/ABSN URINE INCON ASSESS: CPT | Performed by: NURSE PRACTITIONER

## 2024-09-30 PROCEDURE — 1123F ACP DISCUSS/DSCN MKR DOCD: CPT | Performed by: NURSE PRACTITIONER

## 2024-09-30 PROCEDURE — G8399 PT W/DXA RESULTS DOCUMENT: HCPCS | Performed by: NURSE PRACTITIONER

## 2024-09-30 PROCEDURE — 3079F DIAST BP 80-89 MM HG: CPT | Performed by: NURSE PRACTITIONER

## 2024-09-30 PROCEDURE — 3017F COLORECTAL CA SCREEN DOC REV: CPT | Performed by: NURSE PRACTITIONER

## 2024-09-30 PROCEDURE — G8427 DOCREV CUR MEDS BY ELIG CLIN: HCPCS | Performed by: NURSE PRACTITIONER

## 2024-09-30 RX ORDER — FLUCONAZOLE 150 MG/1
150 TABLET ORAL
Qty: 2 TABLET | Refills: 0 | Status: SHIPPED | OUTPATIENT
Start: 2024-09-30 | End: 2024-10-06

## 2024-09-30 RX ORDER — METHYLPREDNISOLONE 4 MG
TABLET, DOSE PACK ORAL
Qty: 1 KIT | Refills: 0 | Status: SHIPPED | OUTPATIENT
Start: 2024-09-30

## 2024-09-30 NOTE — PROGRESS NOTES
NIGHT 90 tablet 1    diphenhydrAMINE (BENADRYL) 12.5 MG/5ML elixir Take 10 mLs by mouth daily      losartan (COZAAR) 50 MG tablet TAKE 1 TABLET BY MOUTH DAILY 90 tablet 1    Omega-3 Fatty Acids (FISH OIL) 1000 MG CAPS Take 1 capsule by mouth daily      loratadine (CLARITIN) 10 MG capsule Take 1 capsule by mouth daily      ascorbic acid (VITAMIN C) 500 MG tablet Take 8 tablets by mouth daily      mupirocin (BACTROBAN) 2 % ointment Apply topically to affected area daily 30 g 1    vitamin B-12 (CYANOCOBALAMIN) 500 MCG tablet Take 1 tablet by mouth daily      Multiple Vitamins-Minerals (MULTIVITAMIN ADULT PO) Take by mouth      Cholecalciferol (VITAMIN D) 2000 UNITS CAPS capsule Take 1 capsule by mouth daily      Probiotic Product (PROBIOTIC ADVANCED PO) Take by mouth      zolpidem (AMBIEN) 5 MG tablet Take 1 tablet by mouth as needed for Sleep for up to 90 days. 30 tablet 2     No current facility-administered medications for this visit.       Review of Systems   All other systems reviewed and are negative.         Objective   Vitals:    09/30/24 0955   BP: 122/80   Temp: 96.9 °F (36.1 °C)   Weight: 86.2 kg (190 lb)   Height: 1.676 m (5' 6\")       Physical Exam  Constitutional:       Appearance: Normal appearance. She is well-developed and normal weight.   HENT:      Head: Normocephalic.      Right Ear: Tympanic membrane normal.      Left Ear: Tympanic membrane normal.      Mouth/Throat:      Mouth: Mucous membranes are moist.   Eyes:      Pupils: Pupils are equal, round, and reactive to light.   Cardiovascular:      Rate and Rhythm: Normal rate and regular rhythm.      Heart sounds: Normal heart sounds.   Pulmonary:      Effort: Pulmonary effort is normal.      Breath sounds: Normal breath sounds.   Musculoskeletal:         General: Normal range of motion.      Cervical back: Normal range of motion.   Skin:     General: Skin is warm and dry.   Neurological:      Mental Status: She is alert and oriented to person,

## 2024-10-01 ENCOUNTER — PATIENT MESSAGE (OUTPATIENT)
Dept: INTERNAL MEDICINE CLINIC | Age: 74
End: 2024-10-01

## 2024-10-01 DIAGNOSIS — E03.4 HYPOTHYROIDISM DUE TO ACQUIRED ATROPHY OF THYROID: ICD-10-CM

## 2024-10-01 DIAGNOSIS — I10 PRIMARY HYPERTENSION: ICD-10-CM

## 2024-10-01 DIAGNOSIS — E78.00 PURE HYPERCHOLESTEROLEMIA: ICD-10-CM

## 2024-10-01 DIAGNOSIS — E03.4 HYPOTHYROIDISM DUE TO ACQUIRED ATROPHY OF THYROID: Primary | ICD-10-CM

## 2024-10-01 RX ORDER — SIMVASTATIN 40 MG
TABLET ORAL
Qty: 90 TABLET | Refills: 3 | Status: SHIPPED | OUTPATIENT
Start: 2024-10-01 | End: 2024-10-02 | Stop reason: SDUPTHER

## 2024-10-01 RX ORDER — LOSARTAN POTASSIUM 50 MG/1
50 TABLET ORAL DAILY
Qty: 90 TABLET | Refills: 3 | Status: SHIPPED | OUTPATIENT
Start: 2024-10-01 | End: 2024-10-02 | Stop reason: SDUPTHER

## 2024-10-01 RX ORDER — HYDROCHLOROTHIAZIDE 25 MG/1
TABLET ORAL
Qty: 90 TABLET | Refills: 3 | Status: SHIPPED | OUTPATIENT
Start: 2024-10-01 | End: 2024-10-02 | Stop reason: SDUPTHER

## 2024-10-01 RX ORDER — LEVOTHYROXINE SODIUM 150 UG/1
150 TABLET ORAL DAILY
Qty: 90 TABLET | Refills: 3 | Status: SHIPPED | OUTPATIENT
Start: 2024-10-01 | End: 2024-10-02 | Stop reason: SDUPTHER

## 2024-10-01 NOTE — TELEPHONE ENCOUNTER
Pt states her medication were sent to wrong pharmacy. In her Mychart message she asked for them to go to pharmacy below. Please resend medications.     losartan (COZAAR) 50 MG tablet [[id number]]       simvastatin (ZOCOR) 40 MG tablet [[id number]]     levothyroxine (SYNTHROID) 150 MCG tablet [[id number]]     hydroCHLOROthiazide (HYDRODIURIL) 25 MG tablet [[id number]]     [facility] - [address] [phone] - F [phone]

## 2024-10-02 RX ORDER — SIMVASTATIN 40 MG
TABLET ORAL
Qty: 90 TABLET | Refills: 3 | Status: CANCELLED | OUTPATIENT
Start: 2024-10-02

## 2024-10-02 RX ORDER — SIMVASTATIN 40 MG
TABLET ORAL
Qty: 90 TABLET | Refills: 3 | Status: SHIPPED | OUTPATIENT
Start: 2024-10-02

## 2024-10-02 RX ORDER — LOSARTAN POTASSIUM 50 MG/1
50 TABLET ORAL DAILY
Qty: 90 TABLET | Refills: 3 | Status: SHIPPED | OUTPATIENT
Start: 2024-10-02

## 2024-10-02 RX ORDER — HYDROCHLOROTHIAZIDE 25 MG/1
TABLET ORAL
Qty: 90 TABLET | Refills: 3 | Status: SHIPPED | OUTPATIENT
Start: 2024-10-02

## 2024-10-02 RX ORDER — HYDROCHLOROTHIAZIDE 25 MG/1
TABLET ORAL
Qty: 90 TABLET | Refills: 3 | Status: CANCELLED | OUTPATIENT
Start: 2024-10-02

## 2024-10-02 RX ORDER — LEVOTHYROXINE SODIUM 150 UG/1
150 TABLET ORAL DAILY
Qty: 90 TABLET | Refills: 3 | Status: SHIPPED | OUTPATIENT
Start: 2024-10-02

## 2024-10-02 RX ORDER — LEVOTHYROXINE SODIUM 150 UG/1
150 TABLET ORAL DAILY
Qty: 90 TABLET | Refills: 3 | Status: CANCELLED | OUTPATIENT
Start: 2024-10-02

## 2024-10-02 RX ORDER — LOSARTAN POTASSIUM 50 MG/1
50 TABLET ORAL DAILY
Qty: 90 TABLET | Refills: 3 | Status: CANCELLED | OUTPATIENT
Start: 2024-10-02

## 2024-10-02 NOTE — TELEPHONE ENCOUNTER
Pt calling to checking on the status of her refills    Pt spoke to [facility] they no longer have her scripts, please send 90 supply to [facility].

## 2025-01-13 RX ORDER — ESCITALOPRAM OXALATE 10 MG/1
15 TABLET ORAL DAILY
Qty: 135 TABLET | Refills: 1 | Status: SHIPPED | OUTPATIENT
Start: 2025-01-13

## 2025-01-21 ENCOUNTER — HOSPITAL ENCOUNTER (OUTPATIENT)
Age: 75
Discharge: HOME | End: 2025-01-21
Payer: MEDICARE

## 2025-01-21 DIAGNOSIS — F51.04 PSYCHOPHYSIOLOGICAL INSOMNIA: ICD-10-CM

## 2025-01-21 DIAGNOSIS — E87.6 HYPOKALEMIA: ICD-10-CM

## 2025-01-21 DIAGNOSIS — R73.9 HYPERGLYCEMIA: ICD-10-CM

## 2025-01-21 DIAGNOSIS — F41.9 ANXIETY: ICD-10-CM

## 2025-01-21 DIAGNOSIS — E03.4 HYPOTHYROIDISM DUE TO ACQUIRED ATROPHY OF THYROID: ICD-10-CM

## 2025-01-21 DIAGNOSIS — E66.09 CLASS 1 OBESITY DUE TO EXCESS CALORIES WITH SERIOUS COMORBIDITY AND BODY MASS INDEX (BMI) OF 30.0 TO 30.9 IN ADULT: ICD-10-CM

## 2025-01-21 DIAGNOSIS — E66.811 CLASS 1 OBESITY DUE TO EXCESS CALORIES WITH SERIOUS COMORBIDITY AND BODY MASS INDEX (BMI) OF 30.0 TO 30.9 IN ADULT: ICD-10-CM

## 2025-01-21 LAB
ALBUMIN SERPL-MCNC: 4.3 G/DL (ref 3.4–5)
ALBUMIN/GLOB SERPL: 1.4 {RATIO} (ref 1.1–2.2)
ALP SERPL-CCNC: 79 U/L (ref 40–129)
ALT SERPL-CCNC: 19 U/L (ref 10–40)
ANION GAP SERPL CALCULATED.3IONS-SCNC: 10 MMOL/L (ref 3–16)
AST SERPL-CCNC: 31 U/L (ref 15–37)
BASOPHILS # BLD: 0.1 K/UL (ref 0–0.2)
BASOPHILS NFR BLD: 1.5 %
BILIRUB SERPL-MCNC: 1 MG/DL (ref 0–1)
BUN SERPL-MCNC: 16 MG/DL (ref 7–20)
CALCIUM SERPL-MCNC: 9.4 MG/DL (ref 8.3–10.6)
CHLORIDE SERPL-SCNC: 101 MMOL/L (ref 99–110)
CHOLEST SERPL-MCNC: 153 MG/DL (ref 0–199)
CO2 SERPL-SCNC: 27 MMOL/L (ref 21–32)
CREAT SERPL-MCNC: 0.8 MG/DL (ref 0.6–1.2)
DEPRECATED RDW RBC AUTO: 12.7 % (ref 12.4–15.4)
EOSINOPHIL # BLD: 0.3 K/UL (ref 0–0.6)
EOSINOPHIL NFR BLD: 4.9 %
EST. AVERAGE GLUCOSE BLD GHB EST-MCNC: 116.9 MG/DL
GFR SERPLBLD CREATININE-BSD FMLA CKD-EPI: 77 ML/MIN/{1.73_M2}
GLUCOSE SERPL-MCNC: 103 MG/DL (ref 70–99)
HBA1C MFR BLD: 5.7 %
HCT VFR BLD AUTO: 42.8 % (ref 36–48)
HDLC SERPL-MCNC: 42 MG/DL (ref 40–60)
HGB BLD-MCNC: 14.8 G/DL (ref 12–16)
LDLC SERPL CALC-MCNC: 95 MG/DL
LYMPHOCYTES # BLD: 2.4 K/UL (ref 1–5.1)
LYMPHOCYTES NFR BLD: 41.7 %
MCH RBC QN AUTO: 32 PG (ref 26–34)
MCHC RBC AUTO-ENTMCNC: 34.5 G/DL (ref 31–36)
MCV RBC AUTO: 92.5 FL (ref 80–100)
MONOCYTES # BLD: 0.6 K/UL (ref 0–1.3)
MONOCYTES NFR BLD: 10.8 %
NEUTROPHILS # BLD: 2.4 K/UL (ref 1.7–7.7)
NEUTROPHILS NFR BLD: 41.1 %
PLATELET # BLD AUTO: 265 K/UL (ref 135–450)
PMV BLD AUTO: 8.3 FL (ref 5–10.5)
POTASSIUM SERPL-SCNC: 3.4 MMOL/L (ref 3.5–5.1)
PROT SERPL-MCNC: 7.4 G/DL (ref 6.4–8.2)
RBC # BLD AUTO: 4.62 M/UL (ref 4–5.2)
SODIUM SERPL-SCNC: 138 MMOL/L (ref 136–145)
TRIGL SERPL-MCNC: 81 MG/DL (ref 0–150)
TSH SERPL DL<=0.005 MIU/L-ACNC: 1.82 UIU/ML (ref 0.27–4.2)
VLDLC SERPL CALC-MCNC: 16 MG/DL
WBC # BLD AUTO: 5.8 K/UL (ref 4–11)

## 2025-01-21 PROCEDURE — 36415 COLL VENOUS BLD VENIPUNCTURE: CPT

## 2025-01-21 PROCEDURE — 83036 HEMOGLOBIN GLYCOSYLATED A1C: CPT

## 2025-01-21 PROCEDURE — 80061 LIPID PANEL: CPT

## 2025-01-21 PROCEDURE — 85025 COMPLETE CBC W/AUTO DIFF WBC: CPT

## 2025-01-21 PROCEDURE — 84443 ASSAY THYROID STIM HORMONE: CPT

## 2025-01-21 PROCEDURE — 80053 COMPREHEN METABOLIC PANEL: CPT

## 2025-01-21 SDOH — ECONOMIC STABILITY: INCOME INSECURITY: IN THE LAST 12 MONTHS, WAS THERE A TIME WHEN YOU WERE NOT ABLE TO PAY THE MORTGAGE OR RENT ON TIME?: NO

## 2025-01-21 SDOH — ECONOMIC STABILITY: FOOD INSECURITY: WITHIN THE PAST 12 MONTHS, THE FOOD YOU BOUGHT JUST DIDN'T LAST AND YOU DIDN'T HAVE MONEY TO GET MORE.: NEVER TRUE

## 2025-01-21 SDOH — HEALTH STABILITY: PHYSICAL HEALTH: ON AVERAGE, HOW MANY DAYS PER WEEK DO YOU ENGAGE IN MODERATE TO STRENUOUS EXERCISE (LIKE A BRISK WALK)?: 6 DAYS

## 2025-01-21 SDOH — ECONOMIC STABILITY: FOOD INSECURITY: WITHIN THE PAST 12 MONTHS, YOU WORRIED THAT YOUR FOOD WOULD RUN OUT BEFORE YOU GOT MONEY TO BUY MORE.: NEVER TRUE

## 2025-01-21 SDOH — HEALTH STABILITY: PHYSICAL HEALTH: ON AVERAGE, HOW MANY MINUTES DO YOU ENGAGE IN EXERCISE AT THIS LEVEL?: 60 MIN

## 2025-01-21 ASSESSMENT — LIFESTYLE VARIABLES
HOW OFTEN DO YOU HAVE A DRINK CONTAINING ALCOHOL: 3
HOW OFTEN DO YOU HAVE SIX OR MORE DRINKS ON ONE OCCASION: 1
HOW MANY STANDARD DRINKS CONTAINING ALCOHOL DO YOU HAVE ON A TYPICAL DAY: 1
HOW MANY STANDARD DRINKS CONTAINING ALCOHOL DO YOU HAVE ON A TYPICAL DAY: 1 OR 2
HOW OFTEN DO YOU HAVE A DRINK CONTAINING ALCOHOL: 2-4 TIMES A MONTH

## 2025-01-21 ASSESSMENT — PATIENT HEALTH QUESTIONNAIRE - PHQ9
SUM OF ALL RESPONSES TO PHQ QUESTIONS 1-9: 0
SUM OF ALL RESPONSES TO PHQ QUESTIONS 1-9: 0
2. FEELING DOWN, DEPRESSED OR HOPELESS: NOT AT ALL
SUM OF ALL RESPONSES TO PHQ QUESTIONS 1-9: 0
SUM OF ALL RESPONSES TO PHQ9 QUESTIONS 1 & 2: 0
SUM OF ALL RESPONSES TO PHQ QUESTIONS 1-9: 0

## 2025-01-24 ENCOUNTER — OFFICE VISIT (OUTPATIENT)
Dept: INTERNAL MEDICINE CLINIC | Age: 75
End: 2025-01-24

## 2025-01-24 VITALS
SYSTOLIC BLOOD PRESSURE: 130 MMHG | DIASTOLIC BLOOD PRESSURE: 84 MMHG | BODY MASS INDEX: 29.25 KG/M2 | HEIGHT: 66 IN | TEMPERATURE: 97.6 F | WEIGHT: 182 LBS | HEART RATE: 78 BPM

## 2025-01-24 DIAGNOSIS — E87.6 HYPOKALEMIA: ICD-10-CM

## 2025-01-24 DIAGNOSIS — M25.561 CHRONIC PAIN OF RIGHT KNEE: ICD-10-CM

## 2025-01-24 DIAGNOSIS — Z00.00 MEDICARE ANNUAL WELLNESS VISIT, SUBSEQUENT: Primary | ICD-10-CM

## 2025-01-24 DIAGNOSIS — N95.9 POST MENOPAUSAL PROBLEMS: ICD-10-CM

## 2025-01-24 DIAGNOSIS — G89.29 CHRONIC PAIN OF RIGHT KNEE: ICD-10-CM

## 2025-01-24 DIAGNOSIS — E03.4 HYPOTHYROIDISM DUE TO ACQUIRED ATROPHY OF THYROID: ICD-10-CM

## 2025-01-24 DIAGNOSIS — I10 PRIMARY HYPERTENSION: ICD-10-CM

## 2025-01-24 DIAGNOSIS — E78.00 PURE HYPERCHOLESTEROLEMIA: ICD-10-CM

## 2025-01-24 NOTE — ASSESSMENT & PLAN NOTE
Slightly low   On HCTZ   Increase k through diet  If continues to be low will cut down HCTZ and increase losartan

## 2025-01-24 NOTE — PROGRESS NOTES
Josh OLMSTEAD MD as Consulting Physician (Otolaryngology)  Chilango Chauhan MD as Consulting Physician (Physical Medicine and Rehab)  Sourav Mcdaniel II, MD (Ophthalmology)     Recommendations for Preventive Services Due: see orders and patient instructions/AVS.  Recommended screening schedule for the next 5-10 years is provided to the patient in written form: see Patient Instructions/AVS.     Reviewed and updated this visit:  Tobacco  Allergies  Meds  Problems  Med Hx  Surg Hx  Soc Hx  Fam Hx

## 2025-01-24 NOTE — ASSESSMENT & PLAN NOTE
Within normal limits for age- retired but still works at [employer], no ADL issues,immunizations up to date, no depression ,no cognitive impairment  Colonoscopy up to date in 2022 repeat in 10 years in 2032  Mammogram up to date - last done in 7/2024  Dexa scan last done in 2019 normal repeat now   Eye exam up to date - glaucoma and cataract every 6 months   Dental exam up to date every 6 months   Exercises as tolerated    No living will, ACP information provided   Findings and recommendations discussed with Pt  Labs reviewed

## 2025-02-23 PROBLEM — Z00.00 MEDICARE ANNUAL WELLNESS VISIT, SUBSEQUENT: Status: RESOLVED | Noted: 2025-01-24 | Resolved: 2025-02-23

## 2025-05-14 ENCOUNTER — CLINICAL SUPPORT (OUTPATIENT)
Dept: INTERNAL MEDICINE CLINIC | Age: 75
End: 2025-05-14

## 2025-05-14 DIAGNOSIS — H61.23 CERUMEN DEBRIS ON TYMPANIC MEMBRANE OF BOTH EARS: Primary | ICD-10-CM

## 2025-07-08 RX ORDER — ESCITALOPRAM OXALATE 10 MG/1
15 TABLET ORAL DAILY
Qty: 135 TABLET | Refills: 1 | Status: SHIPPED | OUTPATIENT
Start: 2025-07-08

## 2025-07-08 NOTE — TELEPHONE ENCOUNTER
Last appointment: 1/24/2025  Next appointment: 7/29/2025        Last refill: 1/13/2025) by Mayda Jacobo,

## 2025-07-22 ENCOUNTER — HOSPITAL ENCOUNTER (OUTPATIENT)
Dept: GENERAL RADIOLOGY | Age: 75
Discharge: HOME | End: 2025-07-22
Payer: MEDICARE

## 2025-07-22 DIAGNOSIS — Z00.00 MEDICARE ANNUAL WELLNESS VISIT, SUBSEQUENT: ICD-10-CM

## 2025-07-22 DIAGNOSIS — N95.9 POST MENOPAUSAL PROBLEMS: ICD-10-CM

## 2025-07-22 PROCEDURE — 77080 DXA BONE DENSITY AXIAL: CPT

## 2025-07-24 ENCOUNTER — HOSPITAL ENCOUNTER (OUTPATIENT)
Age: 75
Discharge: HOME | End: 2025-07-24
Payer: MEDICARE

## 2025-07-24 DIAGNOSIS — R73.9 HYPERGLYCEMIA: ICD-10-CM

## 2025-07-24 DIAGNOSIS — E78.00 PURE HYPERCHOLESTEROLEMIA: ICD-10-CM

## 2025-07-24 DIAGNOSIS — E03.4 HYPOTHYROIDISM DUE TO ACQUIRED ATROPHY OF THYROID: ICD-10-CM

## 2025-07-24 DIAGNOSIS — I10 PRIMARY HYPERTENSION: ICD-10-CM

## 2025-07-24 LAB
ALBUMIN SERPL-MCNC: 4.2 G/DL (ref 3.4–5)
ALBUMIN/GLOB SERPL: 1.5 {RATIO} (ref 1.1–2.2)
ALP SERPL-CCNC: 70 U/L (ref 40–129)
ALT SERPL-CCNC: 21 U/L (ref 10–40)
ANION GAP SERPL CALCULATED.3IONS-SCNC: 13 MMOL/L (ref 3–16)
AST SERPL-CCNC: 29 U/L (ref 15–37)
BASOPHILS # BLD: 0.1 K/UL (ref 0–0.2)
BASOPHILS NFR BLD: 1.2 %
BILIRUB SERPL-MCNC: 1.1 MG/DL (ref 0–1)
BUN SERPL-MCNC: 14 MG/DL (ref 7–20)
CALCIUM SERPL-MCNC: 9.3 MG/DL (ref 8.3–10.6)
CHLORIDE SERPL-SCNC: 104 MMOL/L (ref 99–110)
CHOLEST SERPL-MCNC: 161 MG/DL (ref 0–199)
CO2 SERPL-SCNC: 25 MMOL/L (ref 21–32)
CREAT SERPL-MCNC: 0.9 MG/DL (ref 0.6–1.2)
DEPRECATED RDW RBC AUTO: 13 % (ref 12.4–15.4)
EOSINOPHIL # BLD: 0.2 K/UL (ref 0–0.6)
EOSINOPHIL NFR BLD: 3.1 %
GFR SERPLBLD CREATININE-BSD FMLA CKD-EPI: 67 ML/MIN/{1.73_M2}
GLUCOSE SERPL-MCNC: 111 MG/DL (ref 70–99)
HCT VFR BLD AUTO: 42.8 % (ref 36–48)
HDLC SERPL-MCNC: 43 MG/DL (ref 40–60)
HGB BLD-MCNC: 14.5 G/DL (ref 12–16)
LDL CHOLESTEROL: 97 MG/DL
LYMPHOCYTES # BLD: 2.3 K/UL (ref 1–5.1)
LYMPHOCYTES NFR BLD: 43.6 %
MCH RBC QN AUTO: 31.5 PG (ref 26–34)
MCHC RBC AUTO-ENTMCNC: 33.9 G/DL (ref 31–36)
MCV RBC AUTO: 92.9 FL (ref 80–100)
MONOCYTES # BLD: 0.5 K/UL (ref 0–1.3)
MONOCYTES NFR BLD: 9.3 %
NEUTROPHILS # BLD: 2.2 K/UL (ref 1.7–7.7)
NEUTROPHILS NFR BLD: 42.8 %
PLATELET # BLD AUTO: 222 K/UL (ref 135–450)
PMV BLD AUTO: 9 FL (ref 5–10.5)
POTASSIUM SERPL-SCNC: 3.7 MMOL/L (ref 3.5–5.1)
PROT SERPL-MCNC: 7 G/DL (ref 6.4–8.2)
RBC # BLD AUTO: 4.6 M/UL (ref 4–5.2)
SODIUM SERPL-SCNC: 142 MMOL/L (ref 136–145)
TRIGL SERPL-MCNC: 104 MG/DL (ref 0–150)
TSH SERPL DL<=0.005 MIU/L-ACNC: 0.51 UIU/ML (ref 0.27–4.2)
VLDLC SERPL CALC-MCNC: 21 MG/DL
WBC # BLD AUTO: 5.2 K/UL (ref 4–11)

## 2025-07-24 PROCEDURE — 83036 HEMOGLOBIN GLYCOSYLATED A1C: CPT

## 2025-07-24 PROCEDURE — 80053 COMPREHEN METABOLIC PANEL: CPT

## 2025-07-24 PROCEDURE — 84443 ASSAY THYROID STIM HORMONE: CPT

## 2025-07-24 PROCEDURE — 80061 LIPID PANEL: CPT

## 2025-07-24 PROCEDURE — 36415 COLL VENOUS BLD VENIPUNCTURE: CPT

## 2025-07-24 PROCEDURE — 85025 COMPLETE CBC W/AUTO DIFF WBC: CPT

## 2025-07-25 LAB
EST. AVERAGE GLUCOSE BLD GHB EST-MCNC: 128.4 MG/DL
HBA1C MFR BLD: 6.1 %

## 2025-07-29 ENCOUNTER — OFFICE VISIT (OUTPATIENT)
Dept: INTERNAL MEDICINE CLINIC | Age: 75
End: 2025-07-29

## 2025-07-29 VITALS
SYSTOLIC BLOOD PRESSURE: 106 MMHG | BODY MASS INDEX: 31.15 KG/M2 | DIASTOLIC BLOOD PRESSURE: 78 MMHG | TEMPERATURE: 97.3 F | WEIGHT: 193 LBS | OXYGEN SATURATION: 96 % | HEART RATE: 62 BPM

## 2025-07-29 DIAGNOSIS — I10 PRIMARY HYPERTENSION: ICD-10-CM

## 2025-07-29 DIAGNOSIS — R73.03 PREDIABETES: ICD-10-CM

## 2025-07-29 DIAGNOSIS — F51.04 PSYCHOPHYSIOLOGICAL INSOMNIA: ICD-10-CM

## 2025-07-29 DIAGNOSIS — E78.00 PURE HYPERCHOLESTEROLEMIA: ICD-10-CM

## 2025-07-29 DIAGNOSIS — E03.4 HYPOTHYROIDISM DUE TO ACQUIRED ATROPHY OF THYROID: ICD-10-CM

## 2025-07-29 DIAGNOSIS — E04.1 THYROID NODULE: Primary | ICD-10-CM

## 2025-07-29 DIAGNOSIS — F41.9 ANXIETY: ICD-10-CM

## 2025-07-29 NOTE — PROGRESS NOTES
Di Boateng (:  1950) is a 74 y.o. female here for evaluation of the following chief complaint(s):  6 Month Follow-Up      Assessment & Plan   ASSESSMENT/PLAN:  1. Thyroid nodule  Assessment & Plan:  Clinical euthyroid   Last thyroid ultrasound shows nodule that needs need a repeat to monitor   Orders:  -     US THYROID; Future  2. Psychophysiological insomnia  Assessment & Plan:  Able to get off from ambien  Has been taking melatonin and magnesium nightly   3. Hypothyroidism due to acquired atrophy of thyroid  Assessment & Plan:  Clinically and biochemically euthyroid.   Continue current dose of Synthroid.   4. Primary hypertension  Assessment & Plan:  Well-controlled.  Continue current antihypertensive regimen.    5. Pure hypercholesterolemia  Assessment & Plan:   LDL good   Continue with current dose of Simvastatin   6. Anxiety  Assessment & Plan:  Doing well with lexapro. Grieving loss of her mother.   Continue with same med  7. Prediabetes  Assessment & Plan:  Chronic and stable   Discussed low carb diet and exercise       Return in about 6 months (around 2026) for Annual Wellness Visit.         Subjective   SUBJECTIVE/OBJECTIVE:  HPI  She reports doing well   Just return from [place] visiting her daughter's family   Work out daily     Review of Systems:   Constitutional:  Denies fever or chills   Eyes:  Denies change in visual acuity   HENT:  Denies nasal congestion or sore throat   Respiratory:  Denies cough or shortness of breath   Cardiovascular:  Denies chest pain or edema   GI:  Denies abdominal pain, nausea, vomiting, bloody stools or diarrhea   :  Denies dysuria   Musculoskeletal:  Denies back pain or joint pain   Integument:  Denies rash   Neurologic:  Denies headache, focal weakness or sensory changes   Endocrine:  Denies polyuria or polydipsia   Lymphatic:  Denies swollen glands   Psychiatric:  Denies depression or anxiety        Current Outpatient Medications   Medication Sig

## 2025-07-31 ENCOUNTER — TELEPHONE (OUTPATIENT)
Dept: INTERNAL MEDICINE CLINIC | Age: 75
End: 2025-07-31

## 2025-07-31 NOTE — TELEPHONE ENCOUNTER
Patient would like to know the results of her mammogram and would like for someone to explain it to her.

## 2025-08-01 NOTE — TELEPHONE ENCOUNTER
Pt has been notified of results and has been given an explanation of them. She said she called [facility] to schedule and they told her they do not have anything until October. I gave her the phone number to the breast center to call to schedule the US and Diagnostic Mammogram.

## 2025-08-07 ENCOUNTER — HOSPITAL ENCOUNTER (OUTPATIENT)
Dept: MAMMOGRAPHY | Age: 75
Discharge: HOME | End: 2025-08-07
Payer: MEDICARE

## 2025-08-07 ENCOUNTER — HOSPITAL ENCOUNTER (OUTPATIENT)
Dept: ULTRASOUND IMAGING | Age: 75
Discharge: HOME | End: 2025-08-07
Payer: MEDICARE

## 2025-08-07 DIAGNOSIS — R92.8 ABNORMAL MAMMOGRAM OF LEFT BREAST: ICD-10-CM

## 2025-08-07 PROCEDURE — G0279 TOMOSYNTHESIS, MAMMO: HCPCS

## 2025-08-07 PROCEDURE — 76642 ULTRASOUND BREAST LIMITED: CPT

## 2025-08-12 ENCOUNTER — HOSPITAL ENCOUNTER (OUTPATIENT)
Dept: MAMMOGRAPHY | Age: 75
Discharge: HOME | End: 2025-08-12
Payer: MEDICARE

## 2025-08-12 ENCOUNTER — HOSPITAL ENCOUNTER (OUTPATIENT)
Dept: ULTRASOUND IMAGING | Age: 75
Discharge: HOME | End: 2025-08-12
Payer: MEDICARE

## 2025-08-12 DIAGNOSIS — N63.0 BREAST MASS IN FEMALE: ICD-10-CM

## 2025-08-12 PROCEDURE — 2709999900 US BREAST BIOPSY W LOC DEVICE 1ST LESION LEFT

## 2025-08-12 PROCEDURE — 88305 TISSUE EXAM BY PATHOLOGIST: CPT

## 2025-08-12 PROCEDURE — 88360 TUMOR IMMUNOHISTOCHEM/MANUAL: CPT

## 2025-08-12 PROCEDURE — 77065 DX MAMMO INCL CAD UNI: CPT

## 2025-08-12 PROCEDURE — 88341 IMHCHEM/IMCYTCHM EA ADD ANTB: CPT

## 2025-08-12 PROCEDURE — 88342 IMHCHEM/IMCYTCHM 1ST ANTB: CPT

## 2025-09-03 ENCOUNTER — HOSPITAL ENCOUNTER (OUTPATIENT)
Dept: MAMMOGRAPHY | Age: 75
Discharge: HOME | End: 2025-09-03
Payer: MEDICARE

## 2025-09-03 ENCOUNTER — HOSPITAL ENCOUNTER (OUTPATIENT)
Dept: ULTRASOUND IMAGING | Age: 75
Discharge: HOME | End: 2025-09-03
Payer: MEDICARE

## 2025-09-03 DIAGNOSIS — C50.412 MALIGNANT NEOPLASM OF UPPER-OUTER QUADRANT OF LEFT BREAST IN FEMALE, ESTROGEN RECEPTOR POSITIVE (HCC): ICD-10-CM

## 2025-09-03 DIAGNOSIS — R92.8 ABNORMAL MAMMOGRAM: ICD-10-CM

## 2025-09-03 DIAGNOSIS — Z17.0 MALIGNANT NEOPLASM OF UPPER-OUTER QUADRANT OF LEFT BREAST IN FEMALE, ESTROGEN RECEPTOR POSITIVE (HCC): ICD-10-CM

## 2025-09-03 PROCEDURE — 2709999900 US PLACE BREAST LOC DEVICE 1ST LESION LEFT

## 2025-09-03 PROCEDURE — 77065 DX MAMMO INCL CAD UNI: CPT
